# Patient Record
Sex: MALE | Employment: OTHER | URBAN - NONMETROPOLITAN AREA
[De-identification: names, ages, dates, MRNs, and addresses within clinical notes are randomized per-mention and may not be internally consistent; named-entity substitution may affect disease eponyms.]

---

## 2018-04-03 ENCOUNTER — OFFICE VISIT (OUTPATIENT)
Dept: PHYSICAL THERAPY | Facility: CLINIC | Age: 68
End: 2018-04-03
Payer: MEDICARE

## 2018-04-03 DIAGNOSIS — Z96.651 STATUS POST TOTAL RIGHT KNEE REPLACEMENT: Primary | ICD-10-CM

## 2018-04-03 PROCEDURE — 97140 MANUAL THERAPY 1/> REGIONS: CPT

## 2018-04-03 PROCEDURE — G8979 MOBILITY GOAL STATUS: HCPCS | Performed by: PHYSICAL THERAPIST

## 2018-04-03 PROCEDURE — 97110 THERAPEUTIC EXERCISES: CPT

## 2018-04-03 PROCEDURE — G8978 MOBILITY CURRENT STATUS: HCPCS | Performed by: PHYSICAL THERAPIST

## 2018-04-03 NOTE — LETTER
2018    Jessika Abrams MD  [address]  [address] [place] [address]    Patient: Sylvia Rosas  YOB: 1950   Date of Visit: 4/3/2018     Encounter Diagnosis     ICD-10-CM    1  Status post total right knee replacement Z96 651        Dear Dr [facility]HAWAY [facility]:    Please review the attached Plan of Care from [facility] recent visit  Please verify that you agree therapy should continue by signing the attached document and sending it back to our office  If you have any questions or concerns, please don't hesitate to call  Sincerely,    Jaron Soto PT      Referring Provider:      I certify that I have read the below Plan of Care and certify the need for these services furnished under this plan of treatment while under my care  Jessika Abrams MD  [address]  [facility] [address]: [phone]          Daily Note     Today's date: 2018  Patient name: Sylvia Rosas  : 1950  MRN: [id number]  Referring provider: Angel Agee MD  Dx:   Encounter Diagnosis     ICD-10-CM    1  Status post total right knee replacement Z96 651                   Subjective: No new c/o pain today  "I actually was able to sleep last night    I'm starting to really notice improvement with pain, sleeping, everything!"      Objective: See treatment diary below  Precautions R TKR    Specialty Daily Treatment Diary     Manual  4/3       PROM 5min       STM 5min       DTM 5min       TPR                    Exercise Diary  4/3       Balance Board 30x       Parallel Bars-Gait Training 10min       T/G Squats/PF 30x       WP-Hip ABD/ADD&Flex/Ext 30x ea       Bike 10min       Maria R-PF stretch 2min ea       NK Table Ex-Flex/Ext 30x ea, 10#       NK Table Stretch 10min ea       Trimax                                                                                                    Modalities 4/3       MH 20min       IFC 20min Assessment: Tolerated treatment well  Patient exhibited good technique with therapeutic exercises and would benefit from continued PT      Plan: Continue per plan of care  Progress treatment as tolerated  PT Re-Evaluation     Today's date: 2018  Patient name: Albert Brunner  : 1950  MRN: [id number]  Referring provider: Paloma Altamirano MD  Dx:   Encounter Diagnosis     ICD-10-CM    1  Status post total right knee replacement Z96 651        Start Time: 930  Stop Time: 113  Total time in clinic (min): 120 minutes    Assessment    Assessment details: R TKR Sx  Understanding of Dx/Px/POC: excellent   Prognosis: good    Goals  STG- Increase ROM  Decrees  STG - Increase strength by 5 lbs  STG - Full ROM  LTG - Normal standing time and walking distances      Plan  Planned modality interventions: unattended electrical stimulation  Planned therapy interventions: balance, balance/weight bearing training, coordination, flexibility, functional ROM exercises, gait training, graded exercise, therapeutic activities, stretching, strengthening, patient education, manual therapy and joint mobilization  Frequency: 3x week  Duration in weeks: 6        Subjective Evaluation    History of Present Illness  Date of onset: 2018  Date of surgery: 2018  Mechanism of injury: surgery  Mechanism of injury: R TKR Sx    Not a recurrent problem   Quality of life: excellent    Pain  Current pain ratin  At best pain ratin  At worst pain ratin  Quality: discomfort, pressure, pulling, sharp, tight, throbbing and dull ache  Relieving factors: change in position, heat, relaxation, rest and medications  Aggravating factors: running, lifting, stair climbing, walking and standing  Progression: improved    Treatments  Previous treatment: physical therapy  Current treatment: physical therapy  Patient Goals  Patient goals for therapy: decreased pain, improved balance, increased motion, return to work, return to [employer], independence with ADLs/IADLs and increased strength          Objective     Observations     Right Knee   Positive for incision and spasms  Palpation     Right   Muscle spasm in the distal semitendinosus, lateral gastrocnemius and medial gastrocnemius  Tenderness of the distal semitendinosus, lateral gastrocnemius and medial gastrocnemius  Tenderness     Right Knee   Tenderness in the patellar tendon  Passive Range of Motion   Left Knee   Flexion: 115 degrees   Extension: 0 degrees     Right Knee   Flexion: 100 degrees with pain  Extension: 3 degrees with pain    Strength/Myotome Testing     Left Knee   Left knee flexion strength: 0/10   22 lbs  Left knee extension strength: 0/10   37 lbs  Right Knee   Right knee flexion strength: 4/10   18 lbs  Right knee extension strength: 4/10   17 lbs            Precautions: R TKR    Daily Treatment Diary

## 2018-04-04 PROCEDURE — 97014 ELECTRIC STIMULATION THERAPY: CPT

## 2018-04-04 NOTE — PROGRESS NOTES
Daily Note     Today's date: 2018  Patient name: Karen Garcia  : 1950  MRN: [id number]  Referring provider: Connor Murphy MD  Dx:   Encounter Diagnosis     ICD-10-CM    1  Status post total right knee replacement Z96 651                   Subjective: No new c/o pain today  "I actually was able to sleep last night  I'm starting to really notice improvement with pain, sleeping, everything!"      Objective: See treatment diary below  Precautions R TKR    Specialty Daily Treatment Diary     Manual  4/3       PROM 5min       STM 5min       DTM 5min       TPR                    Exercise Diary  4/3       Balance Board 30x       Parallel Bars-Gait Training 10min       T/G Squats/PF 30x       WP-Hip ABD/ADD&Flex/Ext 30x ea       Bike 10min       Maria R-PF stretch 2min ea       NK Table Ex-Flex/Ext 30x ea, 10#       NK Table Stretch 10min ea       Trimax                                                                                                    Modalities 4/3       MH 20min       IFC 20min                         Assessment: Tolerated treatment well  Patient exhibited good technique with therapeutic exercises and would benefit from continued PT      Plan: Continue per plan of care  Progress treatment as tolerated

## 2018-04-05 ENCOUNTER — OFFICE VISIT (OUTPATIENT)
Dept: PHYSICAL THERAPY | Facility: CLINIC | Age: 68
End: 2018-04-05
Payer: MEDICARE

## 2018-04-05 DIAGNOSIS — Z96.651 STATUS POST TOTAL RIGHT KNEE REPLACEMENT: Primary | ICD-10-CM

## 2018-04-05 PROCEDURE — 97140 MANUAL THERAPY 1/> REGIONS: CPT

## 2018-04-05 PROCEDURE — 97014 ELECTRIC STIMULATION THERAPY: CPT

## 2018-04-05 PROCEDURE — 97110 THERAPEUTIC EXERCISES: CPT

## 2018-04-05 PROCEDURE — 97150 GROUP THERAPEUTIC PROCEDURES: CPT

## 2018-04-05 NOTE — PROGRESS NOTES
Daily Note     Today's date: 2018  Patient name: Martin Vergara  : 1950  MRN: [id number]  Referring provider: Oksana Coker MD  Dx:   Encounter Diagnosis     ICD-10-CM    1  Status post total right knee replacement Z96 651            Subjective:  No new c/o pain today  Objective: See treatment diary below  Precautions R TKR    Specialty Daily Treatment Diary     Manual  4/3 4/5      PROM 5min 15min      STM 5min       DTM 5min       TPR                    Exercise Diary  4/3 4/5      Balance Board 30x 30x      Parallel Bars-Gait Training 10min 10min      T/G Squats/PF 30x 30x      WP-Hip ABD/ADD&Flex/Ext 30x ea 30x      Bike 10min 10min      Maria R-PF stretch 2min ea 2min      NK Table Ex-Flex/Ext 30x ea, 10#       NK Table Stretch 10min ea 10min      Trimax-TKE  30x                                                                                                  Modalities 4/3 4/5      MH 20min 20min      IFC 20min 20min                          Assessment: Tolerated treatment well  Patient exhibited good technique with therapeutic exercises      Plan: Continue per plan of care  Progress treatment as tolerated

## 2018-04-06 ENCOUNTER — OFFICE VISIT (OUTPATIENT)
Dept: PHYSICAL THERAPY | Facility: CLINIC | Age: 68
End: 2018-04-06
Payer: MEDICARE

## 2018-04-06 DIAGNOSIS — Z96.651 STATUS POST TOTAL RIGHT KNEE REPLACEMENT: Primary | ICD-10-CM

## 2018-04-06 PROCEDURE — 97150 GROUP THERAPEUTIC PROCEDURES: CPT

## 2018-04-06 PROCEDURE — 97110 THERAPEUTIC EXERCISES: CPT

## 2018-04-06 PROCEDURE — 97140 MANUAL THERAPY 1/> REGIONS: CPT

## 2018-04-06 PROCEDURE — 97014 ELECTRIC STIMULATION THERAPY: CPT

## 2018-04-06 NOTE — PROGRESS NOTES
Daily Note     Today's date: 2018  Patient name: Najma Keller  : 1950  MRN: [id number]  Referring provider: Myrtle Small MD  Dx:   Encounter Diagnosis     ICD-10-CM    1  Status post total right knee replacement Z96 651        Start Time: 930  Stop Time: 111  Total time in clinic (min): 105 minutes    Subjective:Pt feels he is making progress with his knee  He noted slight increased stiffness after tx yesterday  He also completed extra home ADL's  Objective: See treatment diary below  Precautions R TKR     Specialty Daily Treatment Diary      Manual  4/3 4/5  4/6       PROM 5min 15min  15m       STM 5min           DTM 5min           TPR                                 Exercise Diary  4/3 4/5  4/6       Balance Board 30x 30x  30x       Parallel Bars-Gait Training 10min 10min  10m       T/G Squats/PF 30x 30x  30x       WP-Hip ABD/ADD&Flex/Ext 30x ea 30x  30x       Bike 10min 10min  10m       Maria R-PF stretch 2min ea 2min  2m       NK Table Ex-Flex/Ext 30x ea, 10#    30x ea  10#       NK Table Stretch 10min ea 10min  10m       Trimax-TKE   30x  30x                                                                                                                                                                       Modalities 4/3 4/5  4/6       MH 20min 20min  20m       IFC 20min 20min  20m                               Assessment: Tolerated treatment well  Patient exhibited good technique with therapeutic exercises  CP today due to slight increased swelling  Plan: Continue per plan of care

## 2018-04-10 ENCOUNTER — OFFICE VISIT (OUTPATIENT)
Dept: PHYSICAL THERAPY | Facility: CLINIC | Age: 68
End: 2018-04-10
Payer: MEDICARE

## 2018-04-10 DIAGNOSIS — Z96.651 STATUS POST TOTAL RIGHT KNEE REPLACEMENT: Primary | ICD-10-CM

## 2018-04-10 PROCEDURE — 97014 ELECTRIC STIMULATION THERAPY: CPT | Performed by: PHYSICAL THERAPIST

## 2018-04-10 PROCEDURE — 97110 THERAPEUTIC EXERCISES: CPT | Performed by: PHYSICAL THERAPIST

## 2018-04-10 PROCEDURE — 97140 MANUAL THERAPY 1/> REGIONS: CPT | Performed by: PHYSICAL THERAPIST

## 2018-04-10 NOTE — PROGRESS NOTES
Daily Note     Today's date: 4/10/2018  Patient name: India Puentes  : 1950  MRN: [id number]  Referring provider: Christi Martin MD  Dx:   Encounter Diagnosis     ICD-10-CM    1  Status post total right knee replacement Z96 651                   Subjective: Patient states he is feeling stronger and has more endurance  Patient is able to walk better and walk further  Patient is able to use stairs better now  Objective: See treatment diary below      Assessment: Tolerated treatment well  demonstrated fatigue post treatment and would benefit from continued OT      Plan: Progress treatment as tolerated          Manual  4/3 4/5  4/6  4/10     PROM 5min 15min  15m 15 min     STM 5min           DTM 5min           TPR                                 Exercise Diary  4/3 4/5  4/6  4/10     Balance Board 30x 30x  30x  30x     Parallel Bars-Gait Training 10min 10min  10m  10 min     T/G Squats/PF 30x 30x  30x  30x     WP-Hip ABD/ADD&Flex/Ext 30x ea 30x  30x  30x     Bike 10min 10min  10m  10 min     Maria R-PF stretch 2min ea 2min  2m       NK Table Ex-Flex/Ext 30x ea, 10#    30x ea  10# 30x ea c/r flex     NK Table Stretch 10min ea 10min  10m  10 min flex & ext     Trimax-TKE   30x  30x  30x                                      Modalities 4/3 4/5  4/6  4/10     MH 20min 20min  20m  20 min     IFC 20min 20min  20m  20 min

## 2018-04-11 NOTE — PROGRESS NOTES
PT Re-Evaluation     Today's date: 4/3/2018  Patient name: Keisha Chowdhury  : 1950  MRN: [id number]  Referring provider: Dominic Champagne MD  Dx:   Encounter Diagnosis     ICD-10-CM    1  Status post total right knee replacement Z96 651        Start Time: 930  Stop Time: 1130  Total time in clinic (min): 120 minutes    Assessment    Assessment details: R TKR Sx  Understanding of Dx/Px/POC: excellent   Prognosis: good    Goals  STG- Increase ROM  Decrees  STG - Increase strength by 5 lbs  STG - Full ROM  LTG - Normal standing time and walking distances  Plan  Planned modality interventions: unattended electrical stimulation  Planned therapy interventions: balance, balance/weight bearing training, coordination, flexibility, functional ROM exercises, gait training, graded exercise, therapeutic activities, stretching, strengthening, patient education, manual therapy and joint mobilization  Frequency: 3x week  Duration in weeks: 6      Subjective Evaluation    History of Present Illness  Date of onset: 2018  Date of surgery: 2018  Mechanism of injury: surgery  Mechanism of injury: R TKR Sx    Not a recurrent problem   Quality of life: excellent    Pain  Current pain ratin  At best pain ratin  At worst pain ratin  Quality: discomfort, pressure, pulling, sharp, tight, throbbing and dull ache  Relieving factors: change in position, heat, relaxation, rest and medications  Aggravating factors: running, lifting, stair climbing, walking and standing  Progression: improved    Treatments  Previous treatment: physical therapy  Current treatment: physical therapy  Patient Goals  Patient goals for therapy: decreased pain, improved balance, increased motion, return to work, return to [employer] activities, independence with ADLs/IADLs and increased strength        Objective     Observations     Right Knee   Positive for incision and spasms       Palpation     Right   Muscle spasm in the distal semitendinosus, lateral gastrocnemius and medial gastrocnemius  Tenderness of the distal semitendinosus, lateral gastrocnemius and medial gastrocnemius  Tenderness     Right Knee   Tenderness in the patellar tendon  Passive Range of Motion   Left Knee   Flexion: 115 degrees   Extension: 0 degrees     Right Knee   Flexion: 100 degrees with pain  Extension: 3 degrees with pain    Strength/Myotome Testing     Left Knee   Left knee flexion strength: 0/10   22 lbs  Left knee extension strength: 0/10   37 lbs  Right Knee   Right knee flexion strength: 4/10   18 lbs  Right knee extension strength: 4/10   17 lbs      Precautions: R TKR

## 2018-04-12 ENCOUNTER — OFFICE VISIT (OUTPATIENT)
Dept: PHYSICAL THERAPY | Facility: CLINIC | Age: 68
End: 2018-04-12
Payer: MEDICARE

## 2018-04-12 DIAGNOSIS — Z96.651 STATUS POST TOTAL RIGHT KNEE REPLACEMENT: Primary | ICD-10-CM

## 2018-04-12 PROCEDURE — 97110 THERAPEUTIC EXERCISES: CPT

## 2018-04-12 PROCEDURE — 97140 MANUAL THERAPY 1/> REGIONS: CPT

## 2018-04-12 PROCEDURE — 97014 ELECTRIC STIMULATION THERAPY: CPT

## 2018-04-12 NOTE — PROGRESS NOTES
Daily Note     Today's date: 2018  Patient name: Gilberto Bronson  : 1950  MRN: [id number]  Referring provider: Shanti Garsia MD  Dx:   Encounter Diagnosis     ICD-10-CM    1  Status post total right knee replacement Z96 651                   Subjective: No new c/o pain today  "i'm doing alright during the day, but I just can't sleep at night "       Objective: See treatment diary below      Assessment: Tolerated treatment well  Patient exhibited good technique with therapeutic exercises and would benefit from continued PT      Plan: Continue per plan of care  Progress treatment as tolerated          Manual  4/3 4/5  4/6  4/10  4/12   PROM 5min 15min  15m 15 min  10min   STM 5min           DTM 5min           TPR          5min to R hip/ITband region                       Exercise Diary  4/3 4/5  4/6  4/10  4/12   Balance Board 30x 30x  30x  30x  30x   Parallel Bars-Gait Training 10min 10min  10m  10 min  10min   T/G Squats/PF 30x 30x  30x  30x  30x   WP-Hip ABD/ADD&Flex/Ext 30x ea 30x  30x  30x  30x   Bike 10min 10min  10m  10 min  10min   Maria R-PF stretch 2min ea 2min  2m      2min   NK Table Ex-Flex/Ext 30x ea, 10#    30x ea  10# 30x ea c/r flex  30xea   NK Table Stretch 10min ea 10min  10m  10 min flex & ext  10min   Trimax-TKE   30x  30x  30x  30x                                    Modalities 4/3 4/5  4/6  4/10  4/12   MH 20min 20min  20m  20 min  20min   IFC 20min 20min  20m  20 min  20min

## 2018-04-13 ENCOUNTER — OFFICE VISIT (OUTPATIENT)
Dept: PHYSICAL THERAPY | Facility: CLINIC | Age: 68
End: 2018-04-13
Payer: MEDICARE

## 2018-04-13 DIAGNOSIS — Z96.651 STATUS POST TOTAL RIGHT KNEE REPLACEMENT: Primary | ICD-10-CM

## 2018-04-13 PROCEDURE — 97014 ELECTRIC STIMULATION THERAPY: CPT

## 2018-04-13 PROCEDURE — 97140 MANUAL THERAPY 1/> REGIONS: CPT

## 2018-04-13 PROCEDURE — 97110 THERAPEUTIC EXERCISES: CPT

## 2018-04-13 NOTE — PROGRESS NOTES
Daily Note     Today's date: 2018  Patient name: Missael Carreon  : 1950  MRN: [id number]  Referring provider: Maricarmen Brenner MD  Dx:   Encounter Diagnosis     ICD-10-CM    1  Status post total right knee replacement Z96 651                   Subjective: No new c/o pain today  Objective: See treatment diary below      Assessment: Tolerated treatment well  Patient exhibited good technique with therapeutic exercises and would benefit from continued PT    Plan: Continue per plan of care  Progress treatment as tolerated        Manual  4/13 4/5  4/6  4/10  4/12   PROM 10min 15min  15m 15 min  10min   STM 5min           DTM            TPR          5min to R hip/ITband region                       Exercise Diary  4/13 4/5  4/6  4/10  4/12   Balance Board 30x 30x  30x  30x  30x   Parallel Bars-Gait Training 10min 10min  10m  10 min  10min   T/G Squats/PF 30x 30x  30x  30x  30x   WP-Hip ABD/ADD&Flex/Ext 30x ea 30x  30x  30x  30x   Bike 10min 10min  10m  10 min  10min   Maria R-PF stretch 2min ea 2min  2m      2min   NK Table Ex-Flex/Ext 30x ea, 10#    30x ea  10# 30x ea c/r flex  30xea   NK Table Stretch 10min ea 10min  10m  10 min flex & ext  10min   Trimax-TKE  30x 30x  30x  30x  30x                                    Modalities 4/13 4/5  4/6  4/10  4/12   MH 20min 20min  20m  20 min  20min   IFC 20min 20min  20m  20 min  20min

## 2018-04-16 ENCOUNTER — OFFICE VISIT (OUTPATIENT)
Dept: PHYSICAL THERAPY | Facility: CLINIC | Age: 68
End: 2018-04-16
Payer: MEDICARE

## 2018-04-16 DIAGNOSIS — Z96.651 STATUS POST TOTAL RIGHT KNEE REPLACEMENT: Primary | ICD-10-CM

## 2018-04-16 PROCEDURE — 97140 MANUAL THERAPY 1/> REGIONS: CPT

## 2018-04-16 PROCEDURE — 97110 THERAPEUTIC EXERCISES: CPT

## 2018-04-16 PROCEDURE — 97014 ELECTRIC STIMULATION THERAPY: CPT

## 2018-04-16 NOTE — PROGRESS NOTES
Daily Note     Today's date: 2018  Patient name: Missael Carreon  : 1950  MRN: [id number]  Referring provider: Maricarmen Brenner MD  Dx:   Encounter Diagnosis     ICD-10-CM    1  Status post total right knee replacement Z96 651                   Subjective: No new c/o pain today   " I fell over the weekend, I'm fine  I actually feel like I gave myself a couple extra degrees "      Objective: See treatment diary below      Assessment: Tolerated treatment well  Patient exhibited good technique with therapeutic exercises and would benefit from continued PT      Plan: Continue per plan of care  Progress treatment as tolerated          Manual        PROM 10min 15min      STM 5min        DTM         TPR                              Exercise Diary        Balance Board 30x 30x      Parallel Bars-Gait Training 10min 10min      T/G Squats/PF 30x 30x      WP-Hip ABD/ADD&Flex/Ext 30x ea 30x, 27 5#      Bike 10min 10min      Maria R-PF stretch 2min ea 2min      NK Table Ex-Flex/Ext 30x ea, 10# 30x, 15#      NK Table Stretch 10min ea 10min      Trimax-TKE  30x 30x                                       Modalities       MH 20min 20min      IFC 20min 20min

## 2018-04-17 ENCOUNTER — OFFICE VISIT (OUTPATIENT)
Dept: PHYSICAL THERAPY | Facility: CLINIC | Age: 68
End: 2018-04-17
Payer: MEDICARE

## 2018-04-17 DIAGNOSIS — Z96.651 STATUS POST TOTAL RIGHT KNEE REPLACEMENT: Primary | ICD-10-CM

## 2018-04-17 PROCEDURE — G8978 MOBILITY CURRENT STATUS: HCPCS | Performed by: PHYSICAL THERAPIST

## 2018-04-17 PROCEDURE — 97110 THERAPEUTIC EXERCISES: CPT | Performed by: PHYSICAL THERAPIST

## 2018-04-17 PROCEDURE — G8979 MOBILITY GOAL STATUS: HCPCS | Performed by: PHYSICAL THERAPIST

## 2018-04-17 PROCEDURE — 97140 MANUAL THERAPY 1/> REGIONS: CPT | Performed by: PHYSICAL THERAPIST

## 2018-04-17 NOTE — PROGRESS NOTES
Today's date: 2018  Patient name: Gilberto Bronson  : 1950  MRN: [id number]  Referring provider: Shanti Garsia MD  Dx:   Encounter Diagnosis     ICD-10-CM    1  Status post total right knee replacement Z96 651                   Subjective: Salbador Holstein reports that his knee is really falred up today and he cannot perform his exercises as well today  Patient states he did mop his floor and did a lot of twisting with his knee yesterday  Objective: See treatment diary below    Manual       PROM 10min 15min 5'     STM 5min        DTM    5'     TPR     5'                         Exercise Diary       Balance Board 30x 30x 30x     Parallel Bars-Gait Training 10min 10min 10'     T/G Squats/PF 30x 30x 30     WP-Hip ABD/ADD&Flex/Ext 30x ea 30x, 27 5# 20#-30x     Bike 10min 10min 10'     Maria R-PF stretch 2min ea 2min 2'     NK Table Ex-Flex/Ext 30x ea, 10# 30x, 15# 10#-30x     NK Table Stretch 10min ea 10min 20'-flex,ext     Trimax-TKE  30x 30x 30x                                      Modalities      MH 20min 20min 20'     IFC 20min 20min 20'                                     Assessment: Tolerated treatment well  Patient would benefit from continued PT      Plan: Progress treatment as tolerated

## 2018-04-19 ENCOUNTER — OFFICE VISIT (OUTPATIENT)
Dept: PHYSICAL THERAPY | Facility: CLINIC | Age: 68
End: 2018-04-19
Payer: MEDICARE

## 2018-04-19 ENCOUNTER — TRANSCRIBE ORDERS (OUTPATIENT)
Dept: PHYSICAL THERAPY | Facility: CLINIC | Age: 68
End: 2018-04-19

## 2018-04-19 DIAGNOSIS — Z96.651 STATUS POST TOTAL KNEE REPLACEMENT, RIGHT: Primary | ICD-10-CM

## 2018-04-19 DIAGNOSIS — Z96.651 STATUS POST TOTAL RIGHT KNEE REPLACEMENT: Primary | ICD-10-CM

## 2018-04-19 DIAGNOSIS — R26.2 DIFFICULTY WALKING: ICD-10-CM

## 2018-04-19 DIAGNOSIS — M25.561 ACUTE PAIN OF RIGHT KNEE: ICD-10-CM

## 2018-04-19 DIAGNOSIS — S83.8X1D SPRAIN OF OTHER SPECIFIED PARTS OF RIGHT KNEE, SUBSEQUENT ENCOUNTER: ICD-10-CM

## 2018-04-19 PROCEDURE — 97110 THERAPEUTIC EXERCISES: CPT

## 2018-04-19 PROCEDURE — 97140 MANUAL THERAPY 1/> REGIONS: CPT

## 2018-04-19 NOTE — PROGRESS NOTES
Daily Note     Today's date: 2018  Patient name: Laura Latham  : 1950  MRN: [id number]  Referring provider: Remy Leon MD  Dx:   Encounter Diagnosis     ICD-10-CM    1  Status post total right knee replacement Z96 651                   Subjective: "I haven't been going for walks at home since I flared my knees up from mopping  My knee is coming along though  The pain is getting better, but not at night though "      Objective: See treatment diary below      Assessment: Tolerated treatment well  Patient exhibited good technique with therapeutic exercises and would benefit from continued PT  "my R knee definitely feels looser than when I came in today  I'm able to walk without my cane after treatment  I came in walking with my cane "      Plan: Continue per plan of care  Progress treatment as tolerated          Manual      PROM 10min 15min 5' 15min    STM 5min        DTM    5'     TPR     5'      manual        15min           Exercise Diary      Balance Board 30x 30x 30x 30x    Parallel Bars-Gait Training 10min 10min 10' 10min (around facility)    T/G Squats/PF 30x 30x 30 30x    WP-Hip ABD/ADD&Flex/Ext 30x ea 30x, 27 5# 20#-30x 30x ea  20# (standing)   Bike 10min 10min 10' 10min    Maria R-PF stretch 2min ea 2min 2' 2min    NK Table Ex-Flex/Ext 30x ea, 10# 30x, 15# 10#-30x     NK Table Stretch 10min ea 10min 20'-flex,ext     Trimax-TKE  30x 30x 30x 30x                                     Modalities     MH 20min 20min 20' 20min    IFC 20min 20min 20' 20min

## 2018-04-20 ENCOUNTER — OFFICE VISIT (OUTPATIENT)
Dept: PHYSICAL THERAPY | Facility: CLINIC | Age: 68
End: 2018-04-20
Payer: MEDICARE

## 2018-04-20 DIAGNOSIS — Z96.651 STATUS POST TOTAL RIGHT KNEE REPLACEMENT: Primary | ICD-10-CM

## 2018-04-20 PROCEDURE — 97140 MANUAL THERAPY 1/> REGIONS: CPT

## 2018-04-20 PROCEDURE — 97110 THERAPEUTIC EXERCISES: CPT

## 2018-04-20 PROCEDURE — 97014 ELECTRIC STIMULATION THERAPY: CPT

## 2018-04-20 NOTE — PROGRESS NOTES
Today's date: 2018  Patient name: Sarwat Givens  : 1950  MRN: [id number]  Referring provider: Carly Jane MD  Dx:   Encounter Diagnosis     ICD-10-CM    1  Status post total right knee replacement Z96 651                   Subjective: Opal Zabala reports that his R knee is feeling much better than yesterday evening  "I felt bad all over yesterday after therapy until about 6pm   Then, after that, I was just uncomfortable  Today I feel like a million bucks! I'm going to go easy today "      Objective: See treatment diary below      Assessment: Tolerated treatment well  Patient exhibited good technique with therapeutic exercises and would benefit from continued PT  Patient tolerated new exercises well with no c/o increased symptoms  Patient also demonstrated good understanding by demonstrating HEP and stretching program today  Plan: Continue per plan of care  Progress treatment as tolerated         Manual     PROM 10min 15min 5' 15min 10min   STM 5min        DTM    5'     TPR     5'      manual        15min  5min         Exercise Diary     Balance Board 30x 30x 30x 30x 30x   Parallel Bars-Gait Training 10min 10min 10' 10min (around facility) 10min   T/G Squats/PF 30x 30x 30 30x 30x   WP-Hip ABD/ADD&Flex/Ext 30x ea 30x, 27 5# 20#-30x 30x ea  20# Standing-30x ea   Bike 10min 10min 10' 10min 10min   Maria R-PF stretch 2min ea 2min 2' 2min 2min   NK Table Ex-Flex/Ext 30x ea, 10# 30x, 15# 10#-30x     NK Table Stretch 10min ea 10min 20'-flex,ext  10min ea   Trimax-TKE  30x 30x 30x 30x 30x                                    Modalities    MH 20min 20min 20' 20min 20min   IFC 20min 20min 20' 20min 20min

## 2018-04-23 ENCOUNTER — OFFICE VISIT (OUTPATIENT)
Dept: PHYSICAL THERAPY | Facility: CLINIC | Age: 68
End: 2018-04-23
Payer: MEDICARE

## 2018-04-23 DIAGNOSIS — Z96.651 STATUS POST TOTAL RIGHT KNEE REPLACEMENT: Primary | ICD-10-CM

## 2018-04-23 PROCEDURE — 97140 MANUAL THERAPY 1/> REGIONS: CPT

## 2018-04-23 PROCEDURE — 97014 ELECTRIC STIMULATION THERAPY: CPT

## 2018-04-23 PROCEDURE — 97110 THERAPEUTIC EXERCISES: CPT

## 2018-04-23 NOTE — PROGRESS NOTES
Daily Note     Today's date: 2018  Patient name: Rob Herzog  : 1950  MRN: [id number]  Referring provider: Jacque Lee MD  Dx:   Encounter Diagnosis     ICD-10-CM    1  Status post total right knee replacement Z96 651                   Subjective: "my whole knee feels like its wrapped really tight  I did some walking over the weekend, nothing excessive, but my whole leg is a toothache "      Objective: See treatment diary below      Assessment: Tolerated treatment well  Patient exhibited good technique with therapeutic exercises and would benefit from continued PT      Plan: Continue per plan of care  Progress treatment as tolerated        Manual         PROM 15min       STM, DTM        TPR, MFR         Manual therapy 15min              Exercise Diary         Balance Board 30x       Around facility-Gait Training 10min       T/G Squats/PF 30x       WP-Hip ABD/ADD&Flex/Ext 30x ea, 20#       Bike 10min       Maria R-PF stretch 5'       NK Table Ex-Flex/Ext 10#-30x       NK Table Stretch 10#-10' flex/ext       Trimax-TKE 30x                                        Modalities        MH 20min       IFC 20min

## 2018-04-24 ENCOUNTER — OFFICE VISIT (OUTPATIENT)
Dept: PHYSICAL THERAPY | Facility: CLINIC | Age: 68
End: 2018-04-24
Payer: MEDICARE

## 2018-04-24 DIAGNOSIS — S83.8X1D SPRAIN OF OTHER LIGAMENT OF RIGHT KNEE, SUBSEQUENT ENCOUNTER: ICD-10-CM

## 2018-04-24 DIAGNOSIS — R26.2 DIFFICULTY WALKING: ICD-10-CM

## 2018-04-24 DIAGNOSIS — M25.561 ACUTE PAIN OF RIGHT KNEE: ICD-10-CM

## 2018-04-24 DIAGNOSIS — Z96.651 STATUS POST TOTAL RIGHT KNEE REPLACEMENT: Primary | ICD-10-CM

## 2018-04-24 PROCEDURE — 97140 MANUAL THERAPY 1/> REGIONS: CPT | Performed by: PHYSICAL THERAPIST

## 2018-04-24 PROCEDURE — 97014 ELECTRIC STIMULATION THERAPY: CPT | Performed by: PHYSICAL THERAPIST

## 2018-04-24 PROCEDURE — 97110 THERAPEUTIC EXERCISES: CPT | Performed by: PHYSICAL THERAPIST

## 2018-04-24 NOTE — PROGRESS NOTES
Today's date: 2018  Patient name: Bertha Flores  : 1950  MRN: [id number]  Referring provider: Anu Mckeon MD  Dx:   Encounter Diagnosis     ICD-10-CM    1  Status post total right knee replacement Z96 651    2  Acute pain of right knee M25 561    3  Difficulty walking R26 2    4  Sprain of other ligament of right knee, subsequent encounter [id number]      Subjective: Ruthie Goltz reports that his lateral right knee is very tender to palpation  This is the worse pain I have had with the outside of my knee  20 minutes into treatment he stated his right calf is really sore today also  Objective: See treatment diary below  Patient returned after seeing his doctor and he was cleared with no DVT  Assessment: Tolerated treatment well  Patient would benefit from continued PT    Plan: Progress treatment as tolerated         Manual        PROM 15min       STM, DTM        TPR, MFR         Manual therapy 15min  15'            Exercise Diary        Balance Board 30x 30x      Around facility-Gait Training 10min       T/G Squats/PF 30x 30x      WP-Hip ABD/ADD&Flex/Ext 30x ea, 20#       Bike 10min       Maria R-PF stretch 5'       NK Table Ex-Flex/Ext 10#-30x       NK Table Stretch 10#-10' flex/ext       Trimax-TKE 30x        [id number]-Forward, Backwards, Side - Level & Dips              ME, PE   15'                      Modalities       MH 20min       IFC 20min 20'

## 2018-04-24 NOTE — PROGRESS NOTES
PT Evaluation     Today's date: 2018  Patient name: Kike Cordova  : 1950  MRN: [id number]  Referring provider: Mendoza Palomino MD  Dx:   Encounter Diagnosis     ICD-10-CM    1  Status post total right knee replacement Z96 651    2  Acute pain of right knee M25 561    3  Difficulty walking R26 2    4  Sprain of other ligament of right knee, subsequent encounter S83 8X1D               Assessment    Assessment details: Right lateral knee region flared up  Right calf flared up  Sent to doctor for DVT screen  Understanding of Dx/Px/POC: excellent   Prognosis: good    Goals  STG 2-4 weeks  Increase BLE strength 5-10 lbs  Decrease pain to <5/10 with activity  Increase standing/walking tolerance to >30 minutes  Patient independent with HEP    LTG 6-8 weeks  Increase BLE strength 20 lbs  Decrease pain to <2/10 with activity  Increase single leg stance >30 seconds  Increase standing/walking tolerance to >90 minutes      Plan  Planned therapy interventions: joint mobilization, manual therapy, patient education, postural training, strengthening, stretching, therapeutic activities, therapeutic exercise, coordination, balance and home exercise program  Frequency: 3x week  Duration in weeks: 6        Subjective Evaluation    History of Present Illness  Date of onset: 2018  Date of surgery: 2018  Pain  No pain reported  Quality: discomfort, knife-like, pulling, pressure, sharp, radiating, tight, throbbing and cramping  Relieving factors: ice, medications, heat, change in position, relaxation and rest  Aggravating factors: running, stair climbing, standing, walking and sitting    Patient Goals  Patient goals for therapy: decreased pain, improved balance, increased motion, return to work, return to [employer] activities, independence with ADLs/IADLs and increased strength        Objective    Precautions: Right total knee  R TFL flare up      Date of onset:  18     Date of Surgery: 01/16/18    History of Present Episode: 04/24/18  Patient states his right knee is really flared up  Consistent with TFL region  Past Medical History:    04/24/18  Patient reports R TKR SX this January  Pervious Level of Functional Ability:  04/24/18  Patient states his knee had been getting progressively worse and then he had a TKR Sx  Patient had been getting better but now his TFL is all flared up and he is walking worse now  Inspection / Palpation:  04/24/18  Mesomorphic body type  Significant right lateral knee pain and calf pain present  Told patient to see his doctor about his calf pain  Need to rule out R calf DVT  No signs of wounds or drainage  Patient was tender to palpation of his calf and significant muscle guarding and muscle spasm noted  Carbajal's sign was negative  No signs of ecchymotic or erythemic regions noted  Chief Complaints:  04/24/18  Patient states increased difficulty walking and standing  Significant knee and calf pain noted  Very tender to palation with his right knee and right calf region  Difficulty with dorsiflexion      KNEE PAIN Resting Moving Palpation Standing Walking Running Stairs Sleeping   04/24/18  Lt 0 0 0 0 0 0 0 0   04/24/18  Rt 4-8 4-9 5-10 4-8 4-8 NA 5-9 4-8     KNEE ROM Flexion Extension SLR   04/24/18    Lt 118° 0° 78°   04/24/18    Rt 105° 5° 65°     KNEE STRENGTH Flexion Extension Varus Stress Valgus Stress   04/24/18   Lt 0/10  27 lbs 0/10  40 lbs 0/10  32 lbs 0/10  31 lbs   04/24/18   Rt 4/10  14 lbs 5/10  16 lbs 6/10  15 lbs 7/10  12 lbs

## 2018-04-26 ENCOUNTER — OFFICE VISIT (OUTPATIENT)
Dept: PHYSICAL THERAPY | Facility: CLINIC | Age: 68
End: 2018-04-26
Payer: MEDICARE

## 2018-04-26 DIAGNOSIS — R26.2 DIFFICULTY WALKING: ICD-10-CM

## 2018-04-26 DIAGNOSIS — Z96.651 STATUS POST TOTAL RIGHT KNEE REPLACEMENT: Primary | ICD-10-CM

## 2018-04-26 DIAGNOSIS — S83.8X1D SPRAIN OF OTHER LIGAMENT OF RIGHT KNEE, SUBSEQUENT ENCOUNTER: ICD-10-CM

## 2018-04-26 DIAGNOSIS — M25.561 ACUTE PAIN OF RIGHT KNEE: ICD-10-CM

## 2018-04-26 PROCEDURE — 97110 THERAPEUTIC EXERCISES: CPT | Performed by: PHYSICAL THERAPIST

## 2018-04-26 PROCEDURE — 97140 MANUAL THERAPY 1/> REGIONS: CPT | Performed by: PHYSICAL THERAPIST

## 2018-04-26 PROCEDURE — 97014 ELECTRIC STIMULATION THERAPY: CPT | Performed by: PHYSICAL THERAPIST

## 2018-04-26 NOTE — PROGRESS NOTES
Today's date: 2018  Patient name: Kasie Raza  : 1950  MRN: [id number]  Referring provider: Rafael Jay MD  Dx:   Encounter Diagnosis     ICD-10-CM    1  Status post total right knee replacement Z96 651    2  Acute pain of right knee M25 561    3  Difficulty walking R26 2    4  Sprain of other ligament of right knee, subsequent encounter S83 8X1D       Subjective: Madrigal Her reports that his knee and calf are feeling better today  Patient was able to perform more exercises today  Patient did state he was not able to sleep well last night and thinks he had 2-3 hours of sleep because of his knee pain  Objective: See treatment diary below    Assessment: Tolerated treatment well  Patient would benefit from continued PT    Plan: Progress treatment as tolerated         Manual       PROM 15min       STM, DTM        TPR, MFR         Manual therapy 15min  15' 15'           Exercise Diary       Balance Board 30x 30x 30x     Around facility-Gait Training 10min  10'     T/G Squats/PF 30x 30x 30x     WP-Hip ABD/ADD&Flex/Ext 30x ea, 20#  20#-30x  standing     Bike 10min  10'     Maria R-PF stretch 5'  5'     NK Table Ex-Flex/Ext 10#-30x  20#-30x     NK Table Stretch 10#-10' flex/ext  15#-10' - fl/et     Trimax-TKE 30x  30x      K-Laeg-VH-Forward, Backwards, Side - Level & Dips      30x   Víctor Caceres PE   15'  15'                    Modalities      MH 20min  20'     IFC 20min 20' 20'

## 2018-04-27 ENCOUNTER — OFFICE VISIT (OUTPATIENT)
Dept: PHYSICAL THERAPY | Facility: CLINIC | Age: 68
End: 2018-04-27
Payer: MEDICARE

## 2018-04-27 DIAGNOSIS — M25.561 ACUTE PAIN OF RIGHT KNEE: ICD-10-CM

## 2018-04-27 DIAGNOSIS — S83.8X1D SPRAIN OF OTHER LIGAMENT OF RIGHT KNEE, SUBSEQUENT ENCOUNTER: ICD-10-CM

## 2018-04-27 DIAGNOSIS — R26.2 DIFFICULTY WALKING: ICD-10-CM

## 2018-04-27 DIAGNOSIS — Z96.651 STATUS POST TOTAL RIGHT KNEE REPLACEMENT: Primary | ICD-10-CM

## 2018-04-27 PROCEDURE — 97140 MANUAL THERAPY 1/> REGIONS: CPT | Performed by: PHYSICAL THERAPIST

## 2018-04-27 PROCEDURE — 97110 THERAPEUTIC EXERCISES: CPT | Performed by: PHYSICAL THERAPIST

## 2018-04-27 PROCEDURE — 97014 ELECTRIC STIMULATION THERAPY: CPT | Performed by: PHYSICAL THERAPIST

## 2018-04-27 NOTE — PROGRESS NOTES
Today's date: 2018  Patient name: Francisca Barnett  : 1950  MRN: [id number]  Referring provider: Fabby Daily MD  Dx:   Encounter Diagnosis     ICD-10-CM    1  Status post total right knee replacement Z96 651    2  Acute pain of right knee M25 561    3  Difficulty walking R26 2    4  Sprain of other ligament of right knee, subsequent encounter S83 8X1D      Subjective: Magaly Aaron reports that his left knee and left calf regions are feeling better today than this past Monday  Patient states his calf is not as swollen today  Patient states he is not as stiff today as this past week  Objective: See treatment diary below    Assessment: Tolerated treatment well  Patient would benefit from continued PT    Plan: Progress treatment as tolerated         Manual      PROM 15min       STM, DTM        TPR, MFR         Manual therapy 15min  15' 15' 15min          Exercise Dairy      Balance Board 30x 30x 30x 30x    Around facility-Gait Training 10min  10' 10'    T/G Squats/PF 30x 30x 30x 30x    WP-Hip ABD/ADD&Flex/Ext 30x ea, 20#  20#-30x  standing 20#-30x standing    Bike 10min  10' 10'    Maria R-PF stretch 5'  5' 5'    NK Table Ex-Flex/Ext 10#-30x  20#-30x 20#-30x    NK Table Stretch 10#-10' flex/ext  15#-10' - fl/et 20#-10' fl/et    Trimax-TKE 30x  30x      X-Dwjk-LA-Forward, Backwards, Side - Level & Dips      30x  30x Ashlee Lama, PE   15'  15'  15'                  Modalities     MH 20min  20' 15min    IFC 20min 20' 20' 15min

## 2018-04-30 ENCOUNTER — OFFICE VISIT (OUTPATIENT)
Dept: PHYSICAL THERAPY | Facility: CLINIC | Age: 68
End: 2018-04-30
Payer: MEDICARE

## 2018-04-30 DIAGNOSIS — M25.561 ACUTE PAIN OF RIGHT KNEE: ICD-10-CM

## 2018-04-30 DIAGNOSIS — R26.2 DIFFICULTY WALKING: ICD-10-CM

## 2018-04-30 DIAGNOSIS — S83.8X1D SPRAIN OF OTHER LIGAMENT OF RIGHT KNEE, SUBSEQUENT ENCOUNTER: ICD-10-CM

## 2018-04-30 DIAGNOSIS — Z96.651 STATUS POST TOTAL RIGHT KNEE REPLACEMENT: Primary | ICD-10-CM

## 2018-04-30 PROCEDURE — 97110 THERAPEUTIC EXERCISES: CPT

## 2018-04-30 PROCEDURE — 97014 ELECTRIC STIMULATION THERAPY: CPT

## 2018-04-30 PROCEDURE — 97116 GAIT TRAINING THERAPY: CPT

## 2018-04-30 PROCEDURE — 97140 MANUAL THERAPY 1/> REGIONS: CPT

## 2018-04-30 NOTE — PROGRESS NOTES
Daily Note     Today's date: 2018  Patient name: Shey Yu  : 1950  MRN: [id number]  Referring provider: Randell Ivy MD  Dx:   Encounter Diagnosis     ICD-10-CM    1  Status post total right knee replacement Z96 651    2  Difficulty walking R26 2    3  Acute pain of right knee M25 561    4  Sprain of other ligament of right knee, subsequent encounter S83 8X1D                   Subjective: No new c/o pain today  "I was able to sleep a little again, but I continue to get that gnawing pain in my R leg at night "       Objective: See treatment diary below      Assessment: Tolerated treatment well  Patient exhibited good technique with therapeutic exercises and would benefit from continued PT   "I'm pleased with how well my exercises are going these past few days  My swelling is definitely better than last week "  Patient tolerated all exercises and stretching to R LE well today with no increased symptoms post treatment  Plan: Continue per plan of care  Progress treatment as tolerated        Manual     PROM 15min       STM, DTM        TPR, MFR         Manual therapy 15min  15' 15' 15min 15min         Exercise Dairy     Balance Board 30x 30x 30x 30x 30x   Around facility-Gait Training 10min  10' 10' 5min   T/G Squats/PF 30x 30x 30x 30x 30x   WP-Hip ABD/ADD&Flex/Ext 30x ea, 20#  20#-30x  standing 20#-30x standing 20#, 30x ea standing   Bike 10min  10' 10' 10min   Maria R-PF stretch 5'  5' 5' 5min   NK Table Ex-Flex/Ext 10#-30x  20#-30x 20#-30x    NK Table Stretch 10#-10' flex/ext  15#-10' - fl/et 20#-10' fl/et 10min ea   Trimax-TKE 30x  30x  30x    R-Czya-OL-Forward, Backwards, Side - Level & Dips      30x  30x Carlos Pavon PE   15'  15'  15'                  Modalities    MH 20min  20' 15min 20min   IFC 20min 20' 20' 15min 20min

## 2018-05-01 ENCOUNTER — OFFICE VISIT (OUTPATIENT)
Dept: PHYSICAL THERAPY | Facility: CLINIC | Age: 68
End: 2018-05-01
Payer: MEDICARE

## 2018-05-01 DIAGNOSIS — R26.2 DIFFICULTY WALKING: ICD-10-CM

## 2018-05-01 DIAGNOSIS — S83.8X1D SPRAIN OF OTHER LIGAMENT OF RIGHT KNEE, SUBSEQUENT ENCOUNTER: ICD-10-CM

## 2018-05-01 DIAGNOSIS — Z96.651 STATUS POST TOTAL RIGHT KNEE REPLACEMENT: Primary | ICD-10-CM

## 2018-05-01 DIAGNOSIS — M25.561 ACUTE PAIN OF RIGHT KNEE: ICD-10-CM

## 2018-05-01 PROCEDURE — 97116 GAIT TRAINING THERAPY: CPT | Performed by: PHYSICAL THERAPIST

## 2018-05-01 PROCEDURE — 97140 MANUAL THERAPY 1/> REGIONS: CPT | Performed by: PHYSICAL THERAPIST

## 2018-05-01 PROCEDURE — 97110 THERAPEUTIC EXERCISES: CPT | Performed by: PHYSICAL THERAPIST

## 2018-05-01 NOTE — PROGRESS NOTES
Today's date: 2018  Patient name: Kike Cordova  : 1950  MRN: [id number]  Referring provider: Mendoza Palomino MD  Dx:   Encounter Diagnosis     ICD-10-CM    1  Status post total right knee replacement Z96 651    2  Difficulty walking R26 2    3  Acute pain of right knee M25 561    4  Sprain of other ligament of right knee, subsequent encounter S83 8X1D       Subjective: Ez Bella reports that his right knee is feeling nathaniel today  Patient states he still has pain with his knee but he an tell he is improving  Objective: See treatment diary below    Assessment: Tolerated treatment well  Patient would benefit from continued PT    Plan: Progress treatment as tolerated         Manual     PROM        STM, DTM        TPR, MFR         Manual therapy 15'   15min 15min         Exercise Dairy     Balance Board 30x   30x 30x   Around facility-Gait Training 10'   10' 5min   T/G Squats/PF 30x   30x 30x   WP-Hip ABD/ADD&Flex/Ext 20#-30x   20#-30x standing 20#, 30x ea standing   Bike 10'   10' 10min   Maria R-PF stretch 5'   5' 5min   NK Table Ex-Flex/Ext 2#-30x   20#-30x 20#-30x   NK Table Stretch 20#-Fl/Et 10' ea   20#-10' fl/et 10min ea   Trimax-TKE 30x    30x    G-Wsla-MG-Forward, Backwards, Side - Level & Dips  30x     30x Filemon Robledo PE 13'    15'  15'                Modalities    MH 20'   15min 20min   IFC 20'   15min 20min

## 2018-05-03 ENCOUNTER — OFFICE VISIT (OUTPATIENT)
Dept: PHYSICAL THERAPY | Facility: CLINIC | Age: 68
End: 2018-05-03
Payer: MEDICARE

## 2018-05-03 DIAGNOSIS — Z96.651 STATUS POST TOTAL RIGHT KNEE REPLACEMENT: Primary | ICD-10-CM

## 2018-05-03 DIAGNOSIS — R26.2 DIFFICULTY WALKING: ICD-10-CM

## 2018-05-03 DIAGNOSIS — M25.561 ACUTE PAIN OF RIGHT KNEE: ICD-10-CM

## 2018-05-03 DIAGNOSIS — S83.8X1D SPRAIN OF OTHER LIGAMENT OF RIGHT KNEE, SUBSEQUENT ENCOUNTER: ICD-10-CM

## 2018-05-03 PROCEDURE — 97116 GAIT TRAINING THERAPY: CPT

## 2018-05-03 PROCEDURE — 97014 ELECTRIC STIMULATION THERAPY: CPT

## 2018-05-03 PROCEDURE — 97140 MANUAL THERAPY 1/> REGIONS: CPT

## 2018-05-03 PROCEDURE — 97110 THERAPEUTIC EXERCISES: CPT

## 2018-05-03 NOTE — PROGRESS NOTES
Daily Note     Today's date: 5/3/2018  Patient name: India Puentes  : 1950  MRN: [id number]  Referring provider: Christi Martin MD  Dx:   Encounter Diagnosis     ICD-10-CM    1  Status post total right knee replacement Z96 651    2  Difficulty walking R26 2    3  Acute pain of right knee M25 561    4  Sprain of other ligament of right knee, subsequent encounter S83 8X1D                   Subjective: No new c/o pain today  "I still have a hard time sleeping with this pain in my leg  I feel good now walking around and moving my R leg; its just at nighttime "    Objective: See treatment diary below    Assessment: Tolerated treatment well  Patient exhibited good technique with therapeutic exercises and would benefit from continued PT  Patient reports feeling that he has noticed much improvement at this point  "I feel like this has been the most progress I've made since this all happened  I still need to work on my ROM "    Plan: Continue per plan of care  Progress treatment as tolerated        Manual  5/1 5/3      PROM        STM, DTM        TPR, MFR         Manual therapy 15' 15min            Exercise Dairy  5/1 5/3      Balance Board 30x 30x      Around facility-Gait Training 10' 10min      T/G Squats/PF 30x 30x      WP-Hip ABD/ADD&Flex/Ext 20#-30x 20#, 30x ea      Bike 10' 10min      Maria R-PF stretch 5' 5min      NK Table Ex-Flex/Ext 2#-30x       NK Table Stretch 20#-Fl/Et 10' ea 20min      Trimax-TKE 30x 30x       F-Lrlc-IA-Forward, Backwards, Side - Level & Dips  30x  5min       ME, PE 15'                    Modalities 5/1 5/3      MH 20' 20min      IFC 20' 20min

## 2018-05-04 ENCOUNTER — OFFICE VISIT (OUTPATIENT)
Dept: PHYSICAL THERAPY | Facility: CLINIC | Age: 68
End: 2018-05-04
Payer: MEDICARE

## 2018-05-04 DIAGNOSIS — R26.2 DIFFICULTY WALKING: ICD-10-CM

## 2018-05-04 DIAGNOSIS — S83.8X1D SPRAIN OF OTHER LIGAMENT OF RIGHT KNEE, SUBSEQUENT ENCOUNTER: ICD-10-CM

## 2018-05-04 DIAGNOSIS — Z96.651 STATUS POST TOTAL RIGHT KNEE REPLACEMENT: Primary | ICD-10-CM

## 2018-05-04 DIAGNOSIS — M25.561 ACUTE PAIN OF RIGHT KNEE: ICD-10-CM

## 2018-05-04 PROCEDURE — 97014 ELECTRIC STIMULATION THERAPY: CPT

## 2018-05-04 PROCEDURE — 97110 THERAPEUTIC EXERCISES: CPT

## 2018-05-04 PROCEDURE — 97140 MANUAL THERAPY 1/> REGIONS: CPT

## 2018-05-04 PROCEDURE — 97116 GAIT TRAINING THERAPY: CPT

## 2018-05-04 NOTE — PROGRESS NOTES
Daily Note     Today's date: 2018  Patient name: Clive Shaver  : 1950  MRN: [id number]  Referring provider: Jes Bell MD  Dx:   Encounter Diagnosis     ICD-10-CM    1  Status post total right knee replacement Z96 651    2  Difficulty walking R26 2    3  Acute pain of right knee M25 561    4  Sprain of other ligament of right knee, subsequent encounter S83 8X1D                   Subjective: No new c/o pain today  Objective: See treatment diary below    Assessment: Tolerated treatment well  Patient exhibited good technique with therapeutic exercises and would benefit from continued PT    Plan: Continue per plan of care  Progress treatment as tolerated        Manual  5/1 5/3 5/4     PROM        STM, DTM        TPR, MFR         Manual therapy 15' 15min 15min           Exercise Dairy  5/1 5/3 5/4     Balance Board 30x 30x 30x     Around facility-Gait Training 10' 10min 10min     T/G Squats/PF 30x 30x 30x     WP-Hip ABD/ADD&Flex/Ext 20#-30x 20#, 30x ea 20#, 30x ea     Bike 10' 10min 10min     Maria R-PF stretch 5' 5min 5min     NK Table Ex-Flex/Ext 2#-30x       NK Table Stretch 20#-Fl/Et 10' ea 20min 20min     Trimax-TKE 30x 30x 30x      A-Htju-QG-Forward, Backwards, Side - Level & Dips  30x  5min 5min      ME, PE 15'                    Modalities 5/1 5/3 5/4     MH 20' 20min      IFC 20' 20min 20min      VAS    20min

## 2018-05-07 ENCOUNTER — OFFICE VISIT (OUTPATIENT)
Dept: PHYSICAL THERAPY | Facility: CLINIC | Age: 68
End: 2018-05-07
Payer: MEDICARE

## 2018-05-07 DIAGNOSIS — R26.2 DIFFICULTY WALKING: ICD-10-CM

## 2018-05-07 DIAGNOSIS — M25.561 ACUTE PAIN OF RIGHT KNEE: ICD-10-CM

## 2018-05-07 DIAGNOSIS — S83.8X1D SPRAIN OF OTHER LIGAMENT OF RIGHT KNEE, SUBSEQUENT ENCOUNTER: ICD-10-CM

## 2018-05-07 DIAGNOSIS — Z96.651 STATUS POST TOTAL RIGHT KNEE REPLACEMENT: Primary | ICD-10-CM

## 2018-05-07 PROCEDURE — 97014 ELECTRIC STIMULATION THERAPY: CPT

## 2018-05-07 PROCEDURE — G8979 MOBILITY GOAL STATUS: HCPCS | Performed by: PHYSICAL THERAPIST

## 2018-05-07 PROCEDURE — 97140 MANUAL THERAPY 1/> REGIONS: CPT | Performed by: PHYSICAL THERAPIST

## 2018-05-07 PROCEDURE — 97164 PT RE-EVAL EST PLAN CARE: CPT

## 2018-05-07 PROCEDURE — G8978 MOBILITY CURRENT STATUS: HCPCS | Performed by: PHYSICAL THERAPIST

## 2018-05-07 PROCEDURE — 97116 GAIT TRAINING THERAPY: CPT | Performed by: PHYSICAL THERAPIST

## 2018-05-07 PROCEDURE — 97110 THERAPEUTIC EXERCISES: CPT | Performed by: PHYSICAL THERAPIST

## 2018-05-07 NOTE — PROGRESS NOTES
Daily Note     Today's date: 2018  Patient name: Corinne Kelsey  : 1950  MRN: [id number]  Referring provider: Karen Wood MD  Dx: No diagnosis found  Subjective: No new c/o pain today  "I had a pretty normal weekend  I was able to mow my lawn  I had to do it in three sessions but I got it done  It's self propelled but I'm pleased that I did it!"    Objective: See treatment diary below    Assessment: Tolerated treatment well  Patient exhibited good technique with therapeutic exercises and would benefit from continued PT    Plan: Continue per plan of care  Progress treatment as tolerated        Manual  5/1 5/3 5/4 5/7    PROM        STM, DTM        TPR, MFR         Manual therapy 15' 15min 15min 15'          Exercise Dairy  5/1 5/3 5/4 5/7    Balance Board 30x 30x 30x 30x    Around facility-Gait Training 10' 10min 10min 10'    T/G Squats/PF 30x 30x 30x 30x    WP-Hip ABD/ADD&Flex/Ext 20#-30x 20#, 30x ea 20#, 30x ea 22 5#-30x    Bike 10' 10min 10min 10'    Maria R-PF stretch 5' 5min 5min 5'    NK Table Ex-Flex/Ext 2#-30x   20#-30x    NK Table Stretch 20#-Fl/Et 10' ea 20min 20min 20'    Trimax-TKE 30x 30x 30x 30x     A-Zesg-HP-Forward, Backwards, Side - Level & Dips  30x  5min 5min 5'     ME, PE 15'   15'                 Modalities 5/1 5/3 5/4 5/7    MH 20' 20min  20'    IFC 20' 20min 20min 20'     VAS    20min

## 2018-05-07 NOTE — LETTER
May 8, 2018    Neo Das MD  [address]  [facility]  [address] [place] [address]    Patient: Rob Herzog  YOB: 1950   Date of Visit: 2018     Encounter Diagnosis     ICD-10-CM    1  Status post total right knee replacement Z96 651    2  Difficulty walking R26 2    3  Acute pain of right knee M25 561    4  Sprain of other ligament of right knee, subsequent encounter K94 [id number]        Dear Dr Dagmar Ryan:    Please review the attached Plan of Care from [facility] recent visit  Please verify that you agree therapy should continue by signing the attached document and sending it back to our office  If you have any questions or concerns, please don't hesitate to call  Sincerely,    Tremaine Hylton, PT      Referring Provider:      I certify that I have read the below Plan of Care and certify the need for these services furnished under this plan of treatment while under my care  Neo Das MD  [address]  [facility]  [facility] [phone]          Daily Note     Today's date: 2018  Patient name: Rob Herzog  : 1950  MRN: [id number]  Referring provider: Jacque Lee MD  Dx: No diagnosis found  Subjective: No new c/o pain today  "I had a pretty normal weekend  I was able to mow my lawn  I had to do it in three sessions but I got it done  It's self propelled but I'm pleased that I did it!"    Objective: See treatment diary below    Assessment: Tolerated treatment well  Patient exhibited good technique with therapeutic exercises and would benefit from continued PT    Plan: Continue per plan of care  Progress treatment as tolerated        Manual  5/1 5/3 5/4 5/7    PROM        STM, DTM        TPR, MFR         Manual therapy 15' 15min 15min 15'          Exercise Dairy  5/1 5/3 5/4 5/7    Balance Board 30x 30x 30x 30x    Around facility-Gait Training 10' 10min 10min 10'    T/G Squats/PF 30x 30x 30x 30x    WP-Hip ABD/ADD&Flex/Ext 20#-30x 20#, 30x ea 20#, 30x ea 22 5#-30x    Bike 10' 10min 10min 10'    Maria R-PF stretch 5' 5min 5min 5'    NK Table Ex-Flex/Ext 2#-30x   20#-30x    NK Table Stretch 20#-Fl/Et 10' ea 20min 20min 20'    Trimax-TKE 30x 30x 30x 30x     K-Fcai-IN-Forward, Backwards, Side - Level & Dips  30x  5min 5min 5'     ME, PE 15'   15'                 Modalities  5/3 5/4 5    MH 20' 20min  20'    IFC 20' 20min 20min 20'     VAS    20min                     PT Re-Evaluation     Today's date: 2018  Patient name: Karen Garcia  : 1950  MRN: [id number]  Referring provider: Connor Murphy MD  Dx:   Encounter Diagnosis     ICD-10-CM    1  Status post total right knee replacement Z96 651    2  Difficulty walking R26 2    3  Acute pain of right knee M25 561    4   Sprain of other ligament of right knee, subsequent encounter S83 8X1D        Start Time: 0940  Stop Time: 1200  Total time in clinic (min): 140 minutes    Assessment    Assessment details: R TKR , Fall risk  Understanding of Dx/Px/POC: excellent   Prognosis: good    Goals  STG 2-4 weeks  Increase BLE strength 5-10 lbs  Decrease pain to <5/10 with activity  Increase standing/walking tolerance to >30 minutes  Patient independent with HEP    LTG 6-8 weeks  Increase BLE strength 10-20 lbs  Decrease pain to <2/10 with activity  Increase single leg stance >30 seconds  Increase standing/walking tolerance to >90 minutes    Plan  Planned modality interventions: unattended electrical stimulation  Planned therapy interventions: manual therapy, joint mobilization, balance, balance/weight bearing training, patient education, postural training, strengthening, stretching, therapeutic activities, therapeutic exercise, therapeutic training, gait training, flexibility and coordination  Frequency: 3x week  Duration in weeks: 6      Subjective    Objective     Date of onset:  18     Date of Surgery:  18    History of Present Episode: 5/7/2018  Domingo Palma states his knee deteriorated and he went for a TKR Sx  Past Medical History:    5/7/2018  Domingo Palma reports chronic knee issues  Pervious Level of Functional Ability:  5/7/2018  Domingomerari Palma states steady decline with use of his knee region  Inspection / Palpation:  5/7/2018  Mesomorphic / endomorphic Body Type  No signs of infection  No signs of wounds  No signs of drainage  No signs of ecchymotic regions  No signs of erythremic regions  Mild signs of muscle spasm  Mild signs of muscle guarding  Mild to moderate signs of tenderness reported to palpation  Mild signs of swelling  Positive signs of a surgery site  Current conditions appear consistent with recent episode  Chief Complaints:  5/7/2018  Bjorn reports mild to moderate difficulty with standing  Domingo Palma reports mild to moderate difficulty with walking  Domingo Palma reports mild to moderate diffficulty with movement / use of his knee  Domingomerari Palma reports mild to moderate difficulty with use of stairs  Domingo Palma reports mild to moderate difficulty with use of inclines  Domingo Palma reports mild to moderate difficulty with sleeping  Domingo Palma reports mild to moderate difficulty with his strength and endurance  Domingo Palma reports mild to moderate limitations with his range of motion  Domingo Palma reports mild to moderate difficulty lying on his knee region      KNEE PAIN Resting Moving Palpation Standing Walking Running   5/7/2018 Lt 0 0 0 0 0 0   5/7/2018 Rt 0-4 0-5 0-6 0-5 0-5 NA     KNEE PAIN Stairs Sleeping Twisting Jumping   5/7/2018 Lt 0 0 0 0   5/7/2018 Rt 2-5 0-6 3-6 NA     KNEE AROM Flexion Extension SLR   5/7/2018 Lt 125° 0° 85°   5/7/2018 Rt 103° 2° 72°     KNEE PROM Flexion Extension SLR   5/7/2018 Lt 130° 0° 90°   5/7/2018 Rt 106° 0° 78°     KNEE MMT Flexion Extension Varus Stress Valgus Stress   5/7/2018 Lt 0/10  42 lbs 0/10  41 lbs 0/10  39 lbs 0/10  37 lbs   5/7/2018 Rt 3/10  27 lbs 5/10  25 lbs 4/10  19 lbs 4/10  20 lbs Knee Screen MCL LCL ACL PCL   Right Negative Negative Negative Negative   Left Negative Negative Negative Negative     Knee Screen Patellar Quad Stress Meniscal Medial Meniscal Lateral   Right Negative Negative Negative Negative   Left Negative Negative Negative Negative     Precautions: Right TKR

## 2018-05-07 NOTE — PROGRESS NOTES
PT Re-Evaluation     Today's date: 2018  Patient name: Joe Mccartney  : 1950  MRN: [id number]  Referring provider: Maryruth Lesches, MD  Dx:   Encounter Diagnosis     ICD-10-CM    1  Status post total right knee replacement Z96 651    2  Difficulty walking R26 2    3  Acute pain of right knee M25 561    4  Sprain of other ligament of right knee, subsequent encounter S83 8X1D        Start Time: 0940  Stop Time: 1200  Total time in clinic (min): 140 minutes    Assessment    Assessment details: R TKR , Fall risk  Understanding of Dx/Px/POC: excellent   Prognosis: good    Goals  STG 2-4 weeks  Increase BLE strength 5-10 lbs  Decrease pain to <5/10 with activity  Increase standing/walking tolerance to >30 minutes  Patient independent with HEP    LTG 6-8 weeks  Increase BLE strength 10-20 lbs  Decrease pain to <2/10 with activity  Increase single leg stance >30 seconds  Increase standing/walking tolerance to >90 minutes    Plan  Planned modality interventions: unattended electrical stimulation  Planned therapy interventions: manual therapy, joint mobilization, balance, balance/weight bearing training, patient education, postural training, strengthening, stretching, therapeutic activities, therapeutic exercise, therapeutic training, gait training, flexibility and coordination  Frequency: 3x week  Duration in weeks: 6      Subjective    Objective     Date of onset:  18     Date of Surgery:  18    History of Present Episode: 2018  Laxmi Dos Santos states his knee deteriorated and he went for a TKR Sx  Past Medical History:    2018  Laxmi Dos Santos reports chronic knee issues  Pervious Level of Functional Ability:  2018  Laxmi Dos Santos states steady decline with use of his knee region  Inspection / Palpation:  2018  Mesomorphic / endomorphic Body Type  No signs of infection  No signs of wounds  No signs of drainage  No signs of ecchymotic regions  No signs of erythremic regions    Mild signs of muscle spasm  Mild signs of muscle guarding  Mild to moderate signs of tenderness reported to palpation  Mild signs of swelling  Positive signs of a surgery site  Current conditions appear consistent with recent episode  Chief Complaints:  5/7/2018  Bjorn reports mild to moderate difficulty with standing  [facility] reports mild to moderate difficulty with walking  [facility] reports mild to moderate diffficulty with movement / use of his knee  [facility] reports mild to moderate difficulty with use of stairs  [facility] reports mild to moderate difficulty with use of inclines  [facility] reports mild to moderate difficulty with sleeping  [facility] reports mild to moderate difficulty with his strength and endurance  [facility] reports mild to moderate limitations with his range of motion  [facility] reports mild to moderate difficulty lying on his knee region      KNEE PAIN Resting Moving Palpation Standing Walking Running   5/7/2018 Lt 0 0 0 0 0 0   5/7/2018 Rt 0-4 0-5 0-6 0-5 0-5 NA     KNEE PAIN Stairs Sleeping Twisting Jumping   5/7/2018 Lt 0 0 0 0   5/7/2018 Rt 2-5 0-6 3-6 NA     KNEE AROM Flexion Extension SLR   5/7/2018 Lt 125° 0° 85°   5/7/2018 Rt 103° 2° 72°     KNEE PROM Flexion Extension SLR   5/7/2018 Lt 130° 0° 90°   5/7/2018 Rt 106° 0° 78°     KNEE MMT Flexion Extension Varus Stress Valgus Stress   5/7/2018 Lt 0/10  42 lbs 0/10  41 lbs 0/10  39 lbs 0/10  37 lbs   5/7/2018 Rt 3/10  27 lbs 5/10  25 lbs 4/10  19 lbs 4/10  20 lbs     Knee Screen MCL LCL ACL PCL   Right Negative Negative Negative Negative   Left Negative Negative Negative Negative     Knee Screen Patellar Quad Stress Meniscal Medial Meniscal Lateral   Right Negative Negative Negative Negative   Left Negative Negative Negative Negative     Precautions: Right TKR

## 2018-05-08 ENCOUNTER — OFFICE VISIT (OUTPATIENT)
Dept: PHYSICAL THERAPY | Facility: CLINIC | Age: 68
End: 2018-05-08
Payer: MEDICARE

## 2018-05-08 DIAGNOSIS — R26.2 DIFFICULTY WALKING: ICD-10-CM

## 2018-05-08 DIAGNOSIS — S83.8X1D SPRAIN OF OTHER LIGAMENT OF RIGHT KNEE, SUBSEQUENT ENCOUNTER: ICD-10-CM

## 2018-05-08 DIAGNOSIS — M25.561 ACUTE PAIN OF RIGHT KNEE: ICD-10-CM

## 2018-05-08 DIAGNOSIS — Z96.651 STATUS POST TOTAL RIGHT KNEE REPLACEMENT: Primary | ICD-10-CM

## 2018-05-08 PROCEDURE — 97014 ELECTRIC STIMULATION THERAPY: CPT

## 2018-05-08 PROCEDURE — 97140 MANUAL THERAPY 1/> REGIONS: CPT

## 2018-05-08 PROCEDURE — 97110 THERAPEUTIC EXERCISES: CPT

## 2018-05-08 NOTE — PROGRESS NOTES
Daily Note     Today's date: 2018  Patient name: Wilfredo King  : 1950  MRN: [id number]  Referring provider: Belgica Garzon MD  Dx:   Encounter Diagnosis     ICD-10-CM    1  Status post total right knee replacement Z96 651    2  Difficulty walking R26 2    3  Acute pain of right knee M25 561    4  Sprain of other ligament of right knee, subsequent encounter S83 8X1D        Subjective: No new c/o pain today  Objective: See treatment diary below    Assessment: Tolerated treatment well  Patient exhibited good technique with therapeutic exercises and would benefit from continued PT    Plan: Continue per plan of care  Progress treatment as tolerated        Manual  5/1 5/3 5/4 5/7 5/8   PROM        STM, DTM        TPR, MFR         Manual therapy 15' 15min 15min 15' 15min         Exercise Dairy  5/1 5/3 5/4 5/7 5/8   Balance Board 30x 30x 30x 30x 30x   Around facility-Gait Training 10' 10min 10min 10' 5min   T/G Squats/PF 30x 30x 30x 30x 30x   WP-Hip ABD/ADD&Flex/Ext 20#-30x 20#, 30x ea 20#, 30x ea 22 5#-30x 22 5#  35x ea   Bike 10' 10min 10min 10' 10min   Maria R-PF stretch 5' 5min 5min 5' 5min   NK Table Ex-Flex/Ext 2#-30x   20#-30x    NK Table Stretch 20#-Fl/Et 10' ea 20min 20min 20' 20min   Trimax-TKE 30x 30x 30x 30x 30x    M-Kmsg-ZU-Forward, Backwards, Side - Level & Dips  30x  5min 5min 5' 6x Juliet Finer, PE 15'   15'    Maria R-Abs 65# 35x            Modalities 5/1 5/3 5/4 5/7 5/8   MH 20' 20min  20'    IFC 20' 20min 20min 20' 20min    VAS    20min  20min

## 2018-05-10 ENCOUNTER — OFFICE VISIT (OUTPATIENT)
Dept: PHYSICAL THERAPY | Facility: CLINIC | Age: 68
End: 2018-05-10
Payer: MEDICARE

## 2018-05-10 DIAGNOSIS — R26.2 DIFFICULTY WALKING: ICD-10-CM

## 2018-05-10 DIAGNOSIS — S83.8X1D SPRAIN OF OTHER LIGAMENT OF RIGHT KNEE, SUBSEQUENT ENCOUNTER: ICD-10-CM

## 2018-05-10 DIAGNOSIS — M25.561 ACUTE PAIN OF RIGHT KNEE: ICD-10-CM

## 2018-05-10 DIAGNOSIS — Z96.651 STATUS POST TOTAL RIGHT KNEE REPLACEMENT: Primary | ICD-10-CM

## 2018-05-10 PROCEDURE — 97014 ELECTRIC STIMULATION THERAPY: CPT | Performed by: PHYSICAL THERAPIST

## 2018-05-10 PROCEDURE — 97110 THERAPEUTIC EXERCISES: CPT | Performed by: PHYSICAL THERAPIST

## 2018-05-10 PROCEDURE — 97140 MANUAL THERAPY 1/> REGIONS: CPT | Performed by: PHYSICAL THERAPIST

## 2018-05-10 NOTE — PROGRESS NOTES
Today's date: 5/10/2018  Patient name: Kike Cordova  : 1950  MRN: [id number]  Referring provider: Mendoza Palomino MD  Dx:   Encounter Diagnosis     ICD-10-CM    1  Status post total right knee replacement Z96 651    2  Difficulty walking R26 2    3  Acute pain of right knee M25 561    4  Sprain of other ligament of right knee, subsequent encounter S83 8X1D      Subjective: SelestDataContact Fallow reports that his right knee is finally feeling better  Selvictor manuelino Fallow states this is the best he has walked since he came here  Objective: See treatment diary below    Assessment: Tolerated treatment well  Patient exhibited good technique with therapeutic exercises    Plan: Progress treatment as tolerated         Manual  5/10    5/8   PROM        STM, DTM        TPR, MFR         Manual therapy 15'    15min         Exercise Dairy  5/10    5/8   Balance Board 30x    30x   Around facility-Gait Training 10'    5min   T/G Squats/PF 30x    30x   WP-Hip ABD/ADD&Flex/Ext 22 5#-30x    22 5#  35x ea   Bike 10'    10min   Maria R-PF stretch 5'    5min   NK Table Ex-Flex/Ext        NK Table Stretch 20'    20min   Trimax-TKE 30x    30x    O-Mdni-YL-Forward, Backwards, Side - Level & Dips 30x    6x ea    ME, PE 15'       Maria R-Abs             Modalities 5/10    5/8   MH 20'       IFC 20'    20min    VAS      20min

## 2018-05-11 ENCOUNTER — OFFICE VISIT (OUTPATIENT)
Dept: PHYSICAL THERAPY | Facility: CLINIC | Age: 68
End: 2018-05-11
Payer: MEDICARE

## 2018-05-11 DIAGNOSIS — R26.2 DIFFICULTY WALKING: ICD-10-CM

## 2018-05-11 DIAGNOSIS — M25.561 ACUTE PAIN OF RIGHT KNEE: ICD-10-CM

## 2018-05-11 DIAGNOSIS — Z96.651 STATUS POST TOTAL RIGHT KNEE REPLACEMENT: Primary | ICD-10-CM

## 2018-05-11 DIAGNOSIS — S83.8X1D SPRAIN OF OTHER LIGAMENT OF RIGHT KNEE, SUBSEQUENT ENCOUNTER: ICD-10-CM

## 2018-05-11 PROCEDURE — 97014 ELECTRIC STIMULATION THERAPY: CPT

## 2018-05-11 PROCEDURE — 97110 THERAPEUTIC EXERCISES: CPT | Performed by: PHYSICAL THERAPIST

## 2018-05-11 PROCEDURE — 97140 MANUAL THERAPY 1/> REGIONS: CPT | Performed by: PHYSICAL THERAPIST

## 2018-05-11 NOTE — PROGRESS NOTES
Today's date: 2018  Patient name: Sarwat Givens  : 1950  MRN: [id number]  Referring provider: Carly Jane MD  Dx:   Encounter Diagnosis     ICD-10-CM    1  Status post total right knee replacement Z96 651    2  Difficulty walking R26 2    3  Acute pain of right knee M25 561    4  Sprain of other ligament of right knee, subsequent encounter S83 8X1D      Subjective: Opal Zabala reports that his right knee is slowly feeling better  Opal Zabala states he can stand longer and walk further now  Opal Zabala he is finally sleeping better  Objective: See treatment diary below    Assessment: Tolerated treatment well  Patient would benefit from continued PT    Plan: Progress treatment as tolerated         Manual  5/10 5/11   5/8   PROM        STM, DTM        TPR, MFR         Manual therapy 15' 15'   15min         Exercise Diary  5/10 5/11   5/8   Balance Board 30x 30x   30x   Around facility-Gait Training 10' 10'   5min   T/G Squats/PF 30x 30x   30x   WP-Hip ABD/ADD&Flex/Ext 22 5#-30x 22 5#-30x   22 5#  35x ea   Bike 10' 10'   10min   Maria R-PF stretch 5' 5'   5min   NK Table Ex-Flex/Ext        NK Table Stretch 20' 20'   20min   Trimax-TKE 30x 30x   30x    U-Crsf-UL-Forward, Backwards, Side - Level & Dips 30x 30x   6x Verba Musty, PE 15' 15'      Maria R-Abs             Modalities 5/10 5/11   5/8   MH 20' 20'      IFC 20' 20'   20min    VAS      20min

## 2018-05-14 ENCOUNTER — OFFICE VISIT (OUTPATIENT)
Dept: PHYSICAL THERAPY | Facility: CLINIC | Age: 68
End: 2018-05-14
Payer: MEDICARE

## 2018-05-14 DIAGNOSIS — S83.8X1D SPRAIN OF OTHER LIGAMENT OF RIGHT KNEE, SUBSEQUENT ENCOUNTER: ICD-10-CM

## 2018-05-14 DIAGNOSIS — R26.2 DIFFICULTY WALKING: ICD-10-CM

## 2018-05-14 DIAGNOSIS — Z96.651 STATUS POST TOTAL RIGHT KNEE REPLACEMENT: Primary | ICD-10-CM

## 2018-05-14 DIAGNOSIS — M25.561 ACUTE PAIN OF RIGHT KNEE: ICD-10-CM

## 2018-05-14 PROCEDURE — 97110 THERAPEUTIC EXERCISES: CPT | Performed by: PHYSICAL THERAPIST

## 2018-05-14 PROCEDURE — 97140 MANUAL THERAPY 1/> REGIONS: CPT | Performed by: PHYSICAL THERAPIST

## 2018-05-14 PROCEDURE — 97014 ELECTRIC STIMULATION THERAPY: CPT | Performed by: PHYSICAL THERAPIST

## 2018-05-14 NOTE — PROGRESS NOTES
Today's date: 2018  Patient name: Martin Vergara  : 1950  MRN: [id number]  Referring provider: Oksana Coker MD  Dx:   Encounter Diagnosis     ICD-10-CM    1  Status post total right knee replacement Z96 651    2  Difficulty walking R26 2    3  Acute pain of right knee M25 561    4  Sprain of other ligament of right knee, subsequent encounter S83 8X1D       Subjective: Travon Dries reports that his right knee is really flared up today  C/O severe pain at times at therapy today  Objective: See treatment diary below    Assessment: Tolerated treatment poor  Patient would benefit from continued OT    Plan: Progress treatment as tolerated         Manual  5/10 5/11 5/14  5/8   Manual therapy 15' 15' 15'  15min         Exercise Diary  5/10 5/11 5/14  5/8   Balance Board 30x 30x 30x  30x   Around facility-Gait Training 10' 10' 10'  5min   T/G Squats/PF 30x 30x 30x  30x   WP-Hip ABD/ADD&Flex/Ext 22 5#-30x 22 5#-30x 22 5#-30x  22 5#  35x ea   Bike 10' 10' 10'  10min   Maria R-PF stretch 5' 5' 5'  5min   NK Table Ex-Flex/Ext        NK Table Stretch 20' 20' 20'  20min   Trimax-TKE 30x 30x 30x  30x    F-Lkmt-UQ-Forward, Backwards, Side - Level & Dips 30x 30x 30x  6x Eleanora Oneil, PE 15' 15' 15'     Maria R-Abs             Modalities 5/10 5/11 5/14  5/8   MH 20' 20' 20'     IFC 20' 20' 20'  20min    VAS      20min

## 2018-05-15 ENCOUNTER — OFFICE VISIT (OUTPATIENT)
Dept: PHYSICAL THERAPY | Facility: CLINIC | Age: 68
End: 2018-05-15
Payer: MEDICARE

## 2018-05-15 DIAGNOSIS — S83.8X1D SPRAIN OF OTHER LIGAMENT OF RIGHT KNEE, SUBSEQUENT ENCOUNTER: ICD-10-CM

## 2018-05-15 DIAGNOSIS — M25.561 ACUTE PAIN OF RIGHT KNEE: ICD-10-CM

## 2018-05-15 DIAGNOSIS — Z96.651 STATUS POST TOTAL RIGHT KNEE REPLACEMENT: Primary | ICD-10-CM

## 2018-05-15 DIAGNOSIS — R26.2 DIFFICULTY WALKING: ICD-10-CM

## 2018-05-15 PROCEDURE — 97110 THERAPEUTIC EXERCISES: CPT

## 2018-05-15 PROCEDURE — 97140 MANUAL THERAPY 1/> REGIONS: CPT

## 2018-05-15 PROCEDURE — 97014 ELECTRIC STIMULATION THERAPY: CPT

## 2018-05-15 NOTE — PROGRESS NOTES
Daily Note     Today's date: 5/15/2018  Patient name: Kasie Raza  : 1950  MRN: [id number]  Referring provider: Rafael Jay MD  Dx:   Encounter Diagnosis     ICD-10-CM    1  Status post total right knee replacement Z96 651    2  Difficulty walking R26 2    3  Acute pain of right knee M25 561    4  Sprain of other ligament of right knee, subsequent encounter S83 8X1D      Subjective: "My R leg is much better today  I didn't have the gnawing pain last night "    Objective: See treatment diary below  Manual  5/10 5/11 5/14 5/15    Manual therapy 15' 15' 15' 15min          Exercise Diary  5/10 5/11 5/14 5/15    Balance Board 30x 30x 30x 30x    Around facility-Gait Training 10' 10' 10' 5min    T/G Squats/PF 30x 30x 30x 30x    WP-Hip ABD/ADD&Flex/Ext 22 5#-30x 22 5#-30x 22 5#-30x 15# 30x ea    Bike 10' 10' 10' 10min    Maria R-PF stretch 5' 5' 5' 4min    NK Table Ex-Flex/Ext        NK Table Stretch 20' 20' 20'     Trimax-TKE 30x 30x 30x      V-Chyg-WM-Forward, Backwards, Side - Level & Dips 30x 30x 30x 6x Clabe Tiana, PE 15' 15' 15'     Maria R-Abs             Modalities 5/10 5/11 5/14 5/15    MH 20' 20' 20' 20min    IFC 20' 20' 20' 20min     VAS             Assessment: Tolerated treatment well  Patient exhibited good technique with therapeutic exercises and would benefit from continued PT  Patient reports feeling less tight post tx  Plan: Continue per plan of care  Progress treatment as tolerated

## 2018-05-17 ENCOUNTER — APPOINTMENT (OUTPATIENT)
Dept: PHYSICAL THERAPY | Facility: CLINIC | Age: 68
End: 2018-05-17
Payer: MEDICARE

## 2018-05-18 ENCOUNTER — OFFICE VISIT (OUTPATIENT)
Dept: PHYSICAL THERAPY | Facility: CLINIC | Age: 68
End: 2018-05-18
Payer: MEDICARE

## 2018-05-18 DIAGNOSIS — R26.2 DIFFICULTY WALKING: ICD-10-CM

## 2018-05-18 DIAGNOSIS — Z96.651 STATUS POST TOTAL RIGHT KNEE REPLACEMENT: Primary | ICD-10-CM

## 2018-05-18 DIAGNOSIS — S83.8X1D SPRAIN OF OTHER LIGAMENT OF RIGHT KNEE, SUBSEQUENT ENCOUNTER: ICD-10-CM

## 2018-05-18 DIAGNOSIS — M25.561 ACUTE PAIN OF RIGHT KNEE: ICD-10-CM

## 2018-05-18 PROCEDURE — 97110 THERAPEUTIC EXERCISES: CPT | Performed by: PHYSICAL THERAPIST

## 2018-05-18 PROCEDURE — 97014 ELECTRIC STIMULATION THERAPY: CPT

## 2018-05-18 PROCEDURE — 97140 MANUAL THERAPY 1/> REGIONS: CPT | Performed by: PHYSICAL THERAPIST

## 2018-05-18 NOTE — PROGRESS NOTES
Today's date: 2018  Patient name: Gilberto Bronson  : 1950  MRN: [id number]  Referring provider: Shanti Garsia MD  Dx:   Encounter Diagnosis     ICD-10-CM    1  Status post total right knee replacement Z96 651    2  Difficulty walking R26 2    3  Acute pain of right knee M25 561    4  Sprain of other ligament of right knee, subsequent encounter S83 8X1D      Subjective: Christophe Holstein reports that his right knee is feeling better today  Patient to concentrate on ROM today  Objective: See treatment diary below    Assessment: Tolerated treatment well  Patient exhibited good technique with therapeutic exercises and would benefit from continued PT    Plan: Continue per plan of care  Progress treatment as tolerated         Manual  5/11 5/14 5/15 5/18   MT  15' 15' 15' 15'           Exercise Diary   5/11 5/14 5/15 5/18   Balance Board  30x 30x 30x 30x   Around facility-Gait Training  10' 10' 5min 10'   T/G Squats/PF  30x 30x 30x 30x   WP-Hip ABD/ADD&Flex/Ext  22 5#-30x 22 5#-30x 15# 30x ea    Bike  10' 10' 10min 10'   Maria R-PF stretch  5' 5' 4min 4'   NK Table Ex-Flex/Ext        NK Table Stretch  20' 20'  20'-Fl/Ex   Trimax-TKE  30x 30x      U-Czle-EZ-Forward, Backwards, Side - Level & Dips  30x 30x 6x [place], PE  15' 15'  15'   Maria R-Abs                Modalities  5/11 5/14 5/15 5/18   MH/ES  20' 20' 20' 20'   VAS/ES

## 2018-05-22 ENCOUNTER — OFFICE VISIT (OUTPATIENT)
Dept: PHYSICAL THERAPY | Facility: CLINIC | Age: 68
End: 2018-05-22
Payer: MEDICARE

## 2018-05-22 DIAGNOSIS — R26.2 DIFFICULTY WALKING: ICD-10-CM

## 2018-05-22 DIAGNOSIS — S83.8X1D SPRAIN OF OTHER LIGAMENT OF RIGHT KNEE, SUBSEQUENT ENCOUNTER: ICD-10-CM

## 2018-05-22 DIAGNOSIS — M25.561 ACUTE PAIN OF RIGHT KNEE: ICD-10-CM

## 2018-05-22 DIAGNOSIS — Z96.651 STATUS POST TOTAL RIGHT KNEE REPLACEMENT: Primary | ICD-10-CM

## 2018-05-22 PROCEDURE — 97014 ELECTRIC STIMULATION THERAPY: CPT

## 2018-05-22 PROCEDURE — 97140 MANUAL THERAPY 1/> REGIONS: CPT

## 2018-05-22 PROCEDURE — 97110 THERAPEUTIC EXERCISES: CPT

## 2018-05-22 NOTE — PROGRESS NOTES
Daily Note     Today's date: 2018  Patient name: Benita Crum  : 1950  MRN: [id number]  Referring provider: Filiberto Oliva MD  Dx: No diagnosis found  Subjective: "My R knee is so much better! There is little to no swelling in this thing today  I was able to trim my hedges yesterday  I started at 11am and ended at 8pm   I did take 2-3 hour breaks, but I got it finished  I'm not doing my regular walks yet at home because it hurts to walk, but my movement is better  I think I was just doing too much before  I'm able to sit at my desk longer now, which means I'm able to concentrate on what I'm working on  I don't have that throbbing pain anymore  I'm also able to finally sleep at night too!"      Objective: See treatment diary below    Assessment: Tolerated treatment well  Patient exhibited good technique with therapeutic exercises and would benefit from continued PT  Patient able to tolerate all of his therapeutic exs and txs well with no increase in symptoms  Patient reports feeling no pain when ambulating around the clinic post tx , "It doesn't hurt to walk now!"      Plan: Continue per plan of care  Progress treatment as tolerated      Manual        MT 15min               Exercise Diary         Balance Board 30x       Around facility-Gait Training        T/G Squats/PF 30x       WP-Hip ABD/ADD&Flex/Ext 15# 30x ea       Bike 10min       Maria R-PF stretch 4min       NK Table Ex-Flex/Ext        NK Table Stretch        Trimax-TKE 30x        R-Qkpa-UT-Forward, Backwards, Side - Level & Dips         ME, PE        Maria R-Abs                Modalities        MH/ES 20min       VAS/ES

## 2018-05-24 ENCOUNTER — OFFICE VISIT (OUTPATIENT)
Dept: PHYSICAL THERAPY | Facility: CLINIC | Age: 68
End: 2018-05-24
Payer: MEDICARE

## 2018-05-24 DIAGNOSIS — Z96.651 STATUS POST TOTAL RIGHT KNEE REPLACEMENT: Primary | ICD-10-CM

## 2018-05-24 DIAGNOSIS — S83.8X1D SPRAIN OF OTHER LIGAMENT OF RIGHT KNEE, SUBSEQUENT ENCOUNTER: ICD-10-CM

## 2018-05-24 DIAGNOSIS — R26.2 DIFFICULTY WALKING: ICD-10-CM

## 2018-05-24 DIAGNOSIS — M25.561 ACUTE PAIN OF RIGHT KNEE: ICD-10-CM

## 2018-05-24 PROCEDURE — 97140 MANUAL THERAPY 1/> REGIONS: CPT | Performed by: PHYSICAL THERAPIST

## 2018-05-24 PROCEDURE — 97110 THERAPEUTIC EXERCISES: CPT | Performed by: PHYSICAL THERAPIST

## 2018-05-24 PROCEDURE — 97014 ELECTRIC STIMULATION THERAPY: CPT | Performed by: PHYSICAL THERAPIST

## 2018-05-24 PROCEDURE — 97116 GAIT TRAINING THERAPY: CPT | Performed by: PHYSICAL THERAPIST

## 2018-05-24 NOTE — PROGRESS NOTES
Today's date: 2018  Patient name: Sherrill Armstrong  : 1950  MRN: [id number]  Referring provider: Yolanda Herrera MD  Dx:   Encounter Diagnosis     ICD-10-CM    1  Status post total right knee replacement Z96 651    2  Difficulty walking R26 2    3  Acute pain of right knee M25 561    4  Sprain of other ligament of right knee, subsequent encounter S83 8X1D      Subjective: Sania Navas reports that his left knee and left leg are feeling a lot better  Patient states he is trying to be careful with how much he walks, stands or exercises  Objective: See treatment diary below    Assessment: Tolerated treatment well  Patient exhibited good technique with therapeutic exercises and would benefit from continued PT    Plan: Continue per plan of care  Progress treatment as tolerated         Manual       MT 15min 15'              Exercise Diary        Balance Board 30x 30x      Around facility-Gait Training  10'      T/G Squats/PF 30x 30x      WP-Hip ABD/ADD&Flex/Ext 15# 30x ea 15#-30x      Bike 10min 10'      Maria R-PF stretch 4min 4'      NK Table Ex-Flex/Ext        NK Table Stretch        Trimax-TKE 30x 30x       F-Leth-YJ-Forward, Backwards, Side - Level & Dips  30x       ME, PE  15'      Maria R-Abs                Modalities       MH/ES 20min 20'      VAS/ES

## 2018-05-29 ENCOUNTER — OFFICE VISIT (OUTPATIENT)
Dept: PHYSICAL THERAPY | Facility: CLINIC | Age: 68
End: 2018-05-29
Payer: MEDICARE

## 2018-05-29 DIAGNOSIS — S83.8X1D SPRAIN OF OTHER LIGAMENT OF RIGHT KNEE, SUBSEQUENT ENCOUNTER: ICD-10-CM

## 2018-05-29 DIAGNOSIS — Z96.651 STATUS POST TOTAL RIGHT KNEE REPLACEMENT: Primary | ICD-10-CM

## 2018-05-29 DIAGNOSIS — R26.2 DIFFICULTY WALKING: ICD-10-CM

## 2018-05-29 DIAGNOSIS — M25.561 ACUTE PAIN OF RIGHT KNEE: ICD-10-CM

## 2018-05-29 PROCEDURE — 97110 THERAPEUTIC EXERCISES: CPT

## 2018-05-29 PROCEDURE — 97140 MANUAL THERAPY 1/> REGIONS: CPT

## 2018-05-29 PROCEDURE — 97014 ELECTRIC STIMULATION THERAPY: CPT

## 2018-05-29 NOTE — PROGRESS NOTES
Daily Note     Today's date: 2018  Patient name: Corinne Kelsey  : 1950  MRN: [id number]  Referring provider: Karen Wood MD  Dx:   Encounter Diagnosis     ICD-10-CM    1  Status post total right knee replacement Z96 651    2  Difficulty walking R26 2    3  Acute pain of right knee M25 561    4  Sprain of other ligament of right knee, subsequent encounter S83 8X1D      Subjective: "I'm able to sleep at night without taking any meds  I feel like I've reached another high plateau! I plan on mowing my grass today after therapy  I still try not to over do things  I'm getting better at going down the stairs, I feel like I need another 5 degrees of bend to get it right "    Objective: See treatment diary below    Assessment: Tolerated treatment well  Patient exhibited good technique with therapeutic exercises and would benefit from continued PT    Plan: Continue per plan of care  Progress treatment as tolerated          Manual      MT 15min 15'              Exercise Diary       Balance Board 30x 30x 30x     Around facility-Gait Training  10' 5min     T/G Squats/PF 30x 30x 30x ea     WP-Hip ABD/ADD&Flex/Ext 15# 30x ea 15#-30x 15# 30x ea     Bike 10min 10' 10min     Maria R-PF stretch 4min 4' 4min     NK Table Ex-Flex/Ext        NK Table Stretch        Trimax-TKE 30x 30x       J-Gzrw-EE-Forward, Backwards, Side - Level & Dips  30x 6x ea      ME, PE  15' 15min     Maria R-Abs        Wall Sit   20sec     Modalities      MH/ES 20min 20'      VAS/ES

## 2018-05-31 ENCOUNTER — OFFICE VISIT (OUTPATIENT)
Dept: PHYSICAL THERAPY | Facility: CLINIC | Age: 68
End: 2018-05-31
Payer: MEDICARE

## 2018-05-31 DIAGNOSIS — S83.8X1D SPRAIN OF OTHER LIGAMENT OF RIGHT KNEE, SUBSEQUENT ENCOUNTER: ICD-10-CM

## 2018-05-31 DIAGNOSIS — R26.2 DIFFICULTY WALKING: ICD-10-CM

## 2018-05-31 DIAGNOSIS — Z96.651 STATUS POST TOTAL RIGHT KNEE REPLACEMENT: Primary | ICD-10-CM

## 2018-05-31 DIAGNOSIS — M25.561 ACUTE PAIN OF RIGHT KNEE: ICD-10-CM

## 2018-05-31 PROCEDURE — G8978 MOBILITY CURRENT STATUS: HCPCS | Performed by: PHYSICAL THERAPIST

## 2018-05-31 PROCEDURE — G8979 MOBILITY GOAL STATUS: HCPCS | Performed by: PHYSICAL THERAPIST

## 2018-05-31 PROCEDURE — 97014 ELECTRIC STIMULATION THERAPY: CPT

## 2018-05-31 PROCEDURE — 97110 THERAPEUTIC EXERCISES: CPT

## 2018-05-31 PROCEDURE — 97164 PT RE-EVAL EST PLAN CARE: CPT | Performed by: PHYSICAL THERAPIST

## 2018-05-31 PROCEDURE — 97140 MANUAL THERAPY 1/> REGIONS: CPT

## 2018-05-31 NOTE — LETTER
May 31, 2018      Lady Ruy MD  [address]  [address]    Patient: Kasie Raza  YOB: 1950   Date of Visit: 2018     Encounter Diagnosis     ICD-10-CM    1  Status post total right knee replacement Z96 651    2  Difficulty walking R26 2    3  Acute pain of right knee M25 561    4  Sprain of other ligament of right knee, subsequent encounter I72 [id number]        Dear Dr Helen Diaz:    Please review the attached Plan of Care from [facility] recent visit  Please verify that you agree therapy should continue by signing the attached document and sending it back to our office  If you have any questions or concerns, please don't hesitate to call  Sincerely,    Acosta Boykin PT      Referring Provider:      I certify that I have read the below Plan of Care and certify the need for these services furnished under this plan of treatment while under my care  Lady Ruy MD  [address]  [facility] Lauren: [phone]          Daily Note     Today's date: 2018  Patient name: Kasie Raza  : 1950  MRN: [id number]  Referring provider: Rafael Jay MD  Dx:   Encounter Diagnosis     ICD-10-CM    1  Status post total right knee replacement Z96 651    2  Difficulty walking R26 2    3  Acute pain of right knee M25 561    4  Sprain of other ligament of right knee, subsequent encounter S83 8X1D      Subjective: No new c/o pain today  "Tuesday when I left here 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 and I had to go downstairs and I was able to walk down the stairs almost perfect!"     Objective: See treatment diary below    Assessment: Tolerated treatment well  Patient exhibited good technique with therapeutic exercises and would benefit from continued PT    Plan: Continue per plan of care  Progress treatment as tolerated        Manual     MT 15min 15' 15min 15min            Exercise Diary      Balance Board 30x 30x 30x 30x    Around facility-Gait Training  10' 5min 5min    T/G Squats/PF 30x 30x 30x ea 30x ea    WP-Hip ABD/ADD&Flex/Ext 15# 30x ea 15#-30x 15# 30x ea 20# 30x ea    Bike 10min 10' 10min 10min    Maria R-PF stretch 4min 4' 4min 4min    NK Table Ex-Flex/Ext        NK Table Stretch        Trimax-TKE 30x 30x  30x ea     A-Dien-SE-Forward, Backwards, Side - Level & Dips  30x 6x ea 6x ea     ME, PE  15' 15min 15min    Maria R-Abs        Wall Sit   20sec 20sec    Modalities     MH/ES 20min 20' 20min 20min    VAS/ES                PT Re-Evaluation     Today's date: 2018  Patient name: Shey Yu  : 1950  MRN: [id number]  Referring provider: Randell Ivy MD  Dx:   Encounter Diagnosis     ICD-10-CM    1  Status post total right knee replacement Z96 651    2  Difficulty walking R26 2    3  Acute pain of right knee M25 561    4  Sprain of other ligament of right knee, subsequent encounter S83 8X1D      Start Time: 0950  Stop Time: 1110  Total time in clinic (min): 80 minutes    Assessment    Assessment details: Right Total Knee Replacement with Fall / Injury    Understanding of Dx/Px/POC: excellent  Goals  STG 2-4 weeks  Increase BLE strength 5-10 lbs  Decrease pain to <5/10 with activity  Increase standing/walking tolerance to >30 minutes  Patient independent with HEP    LTG 6-8 weeks  Increase BLE strength 10-20 lbs  Decrease pain to <2/10 with activity  Increase single leg stance >30 seconds  Increase standing/walking tolerance to >90 minutes    Plan  Planned modality interventions: unattended electrical stimulation  Planned therapy interventions: joint mobilization, manual therapy, patient education, strengthening, stretching, therapeutic activities, therapeutic exercise, therapeutic training, transfer training, gait training, flexibility, coordination, balance and balance/weight bearing training  Frequency: 3x week  Duration in weeks: 6      Subjective Evaluation    History of Present Illness  Not a recurrent problem   Quality of life: excellent    Pain  Quality: discomfort, dull ache, sharp, tight and squeezing  Relieving factors: change in position, heat, ice, relaxation, rest and medications  Aggravating factors: running, lifting, standing, walking and stair climbing  Progression: improved    Treatments  Current treatment: physical therapy  Patient Goals  Patient goals for therapy: decreased pain, improved balance, increased motion, return to work, return to [employer] activities, independence with ADLs/IADLs and increased strength        Objective    Flowsheet Rows      Most Recent Value   PT/OT G-Codes   Assessment Type  Re-evaluation   G code set  Mobility: Walking & Moving Around   Mobility: Walking and Moving Around Current Status ()  CI   Mobility: Walking and Moving Around Goal Status ()  [address] [place] [address]        Date of onset:  01/16/18     Date of Surgery:  01/16/18    History of Present Episode: 5/7/2018  Gema Weber states his knee deteriorated and he went for a TKR Sx  Past Medical History:    5/7/2018  Gema Weber reports chronic knee issues  Pervious Level of Functional Ability:  5/7/2018  Gema Weber states steady decline with use of his knee region  Inspection / Palpation:  5/7/2018  Mesomorphic / endomorphic Body Type  No signs of infection  No signs of wounds  No signs of drainage  No signs of ecchymotic regions  No signs of erythremic regions  Mild signs of muscle spasm  Mild signs of muscle guarding  Mild to moderate signs of tenderness reported to palpation  Mild signs of swelling  Positive signs of a surgery site  Current conditions appear consistent with recent episode  Chief Complaints:  5/7/2018  Bjorn reports mild to moderate difficulty with standing  Gema Weber reports mild to moderate difficulty with walking  Gema Weber reports mild to moderate diffficulty with movement / use of his knee    Gema Weber reports mild to moderate difficulty with use of stairs  Sania Yosef reports mild to moderate difficulty with use of inclines  Sania Yosef reports mild to moderate difficulty with sleeping  Sania Yosef reports mild to moderate difficulty with his strength and endurance  Sania Yosef reports mild to moderate limitations with his range of motion  Sania Yosef reports mild to moderate difficulty lying on his knee region  5/31/2018 Bjorn reports mild difficulty with standing  Sania Yosef reports mild difficulty with walking  Sania Yosef reports mild diffficulty with movement / use of his right knee  Sania Yosef reports mild difficulty with use of stairs  Sania Yosef reports mild difficulty with use of inclines  Sania Yosef reports mild difficulty with sleeping  Sania Yosef reports mild difficulty with his strength and endurance  Sania Yosef reports mild limitations with his range of motion  Sania Yosef reports mild difficulty lying on his right knee region      KNEE PAIN Resting Moving Palpation Standing Walking Running   5/7/2018 Lt 0 0 0 0 0 0   5/7/2018 Rt 0-4 0-5 0-6 0-5 0-5 NA   5/31/2018 Rt 0-2 0-2 0-3 0-3 0-3 NA     KNEE PAIN Stairs Sleeping Twisting Jumping   5/7/2018 Lt 0 0 0 0   5/7/2018 Rt 2-5 0-6 3-6 NA   5/31/2018 Rt 1-3 0-3 1-3 NA     KNEE AROM Flexion Extension SLR   5/7/2018 Lt 125° 0° 85°   5/7/2018 Rt 103° 2° 72°   5/31/2018 Rt 114° 1° 79°     KNEE PROM Flexion Extension SLR   5/7/2018 Lt 130° 0° 90°   5/7/2018 Rt 106° 0° 78°   5/31/18 Rt 117° 0° 82°     KNEE MMT Flexion Extension Varus Stress Valgus Stress   5/7/2018 Lt 0/10  42 lbs 0/10  41 lbs 0/10  39 lbs 0/10  37 lbs   5/7/2018 Rt 3/10  27 lbs 5/10  25 lbs 4/10  19 lbs 4/10  20 lbs   5/31/18 Rt 1/10  32 lbs 3/10  31 lbs 2/10  24 lbs 2/10  27 lbs     Precautions: Right TKR

## 2018-05-31 NOTE — PROGRESS NOTES
Daily Note     Today's date: 2018  Patient name: Missael Carreon  : 1950  MRN: [id number]  Referring provider: Maricarmen Brenner MD  Dx:   Encounter Diagnosis     ICD-10-CM    1  Status post total right knee replacement Z96 651    2  Difficulty walking R26 2    3  Acute pain of right knee M25 561    4  Sprain of other ligament of right knee, subsequent encounter S83 8X1D      Subjective: No new c/o pain today  "Tuesday when I left here 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 and I had to go downstairs and I was able to walk down the stairs almost perfect!"     Objective: See treatment diary below    Assessment: Tolerated treatment well  Patient exhibited good technique with therapeutic exercises and would benefit from continued PT    Plan: Continue per plan of care  Progress treatment as tolerated        Manual     MT 15min 15' 15min 15min            Exercise Diary      Balance Board 30x 30x 30x 30x    Around facility-Gait Training  10' 5min 5min    T/G Squats/PF 30x 30x 30x ea 30x ea    WP-Hip ABD/ADD&Flex/Ext 15# 30x ea 15#-30x 15# 30x ea 20# 30x ea    Bike 10min 10' 10min 10min    Maria R-PF stretch 4min 4' 4min 4min    NK Table Ex-Flex/Ext        NK Table Stretch        Trimax-TKE 30x 30x  30x ea     M-Ntxw-VT-Forward, Backwards, Side - Level & Dips  30x 6x ea 6x ea     ME, PE  15' 15min 15min    Maria R-Abs        Wall Sit   20sec 20sec    Modalities     MH/ES 20min 20' 20min 20min    VAS/ES

## 2018-06-05 ENCOUNTER — OFFICE VISIT (OUTPATIENT)
Dept: PHYSICAL THERAPY | Facility: CLINIC | Age: 68
End: 2018-06-05
Payer: MEDICARE

## 2018-06-05 DIAGNOSIS — Z96.651 STATUS POST TOTAL RIGHT KNEE REPLACEMENT: Primary | ICD-10-CM

## 2018-06-05 DIAGNOSIS — R26.2 DIFFICULTY WALKING: ICD-10-CM

## 2018-06-05 DIAGNOSIS — S83.8X1D SPRAIN OF OTHER LIGAMENT OF RIGHT KNEE, SUBSEQUENT ENCOUNTER: ICD-10-CM

## 2018-06-05 DIAGNOSIS — M25.561 ACUTE PAIN OF RIGHT KNEE: ICD-10-CM

## 2018-06-05 PROCEDURE — 97110 THERAPEUTIC EXERCISES: CPT

## 2018-06-05 PROCEDURE — 97140 MANUAL THERAPY 1/> REGIONS: CPT

## 2018-06-05 PROCEDURE — 97014 ELECTRIC STIMULATION THERAPY: CPT | Performed by: PHYSICAL THERAPIST

## 2018-06-05 NOTE — PROGRESS NOTES
Today's date: 2018  Patient name: Kike Cordova  : 1950  MRN: [id number]  Referring provider: Mendoza Palomino MD  Dx:   Encounter Diagnosis     ICD-10-CM    1  Status post total right knee replacement Z96 651    2  Difficulty walking R26 2    3  Acute pain of right knee M25 561    4  Sprain of other ligament of right knee, subsequent encounter S83 8X1D      Subjective: Ez Bella reports that his right knee is slowly feeling better  Patient states he can almost walk up stairs using his right leg  Patient states down stairs is still limited  Patient states his pain level is improving significantly  Objective: See treatment diary below    Assessment: Tolerated treatment well  Patient exhibited good technique with therapeutic exercises and would benefit from continued PT    Plan: Continue per plan of care  Progress treatment as tolerated         Manual  6   MT 15min 15' 15min 15min 15'           Exercise Diary   6   Balance Board 30x 30x 30x 30x 30x   Around facility-Gait Training  10' 5min 5min 5'   T/G Squats/PF 30x 30x 30x ea 30x ea 30x   WP-Hip ABD/ADD&Flex/Ext 15# 30x ea 15#-30x 15# 30x ea 20# 30x ea 20#-30x   Bike 10min 10' 10min 10min 10'   Maria R-PF stretch 4min 4' 4min 4min 4'   NK Table Ex-Flex/Ext        NK Table Stretch        Trimax-TKE 30x 30x  30x ea 30x    H-Fhdn-PM-Forward, Backwards, Side - Level & Dips  30x 6x ea 6x ea 6x    ME, PE  15' 15min 15min 15'   Maria R-Abs        Wall Sit   20sec 20sec 20"   Modalities  6   MH/ES 20min 20' 20min 20min 20'   VAS/ES

## 2018-06-06 ENCOUNTER — TRANSCRIBE ORDERS (OUTPATIENT)
Dept: PHYSICAL THERAPY | Facility: CLINIC | Age: 68
End: 2018-06-06

## 2018-06-06 DIAGNOSIS — M25.561 ACUTE PAIN OF RIGHT KNEE: ICD-10-CM

## 2018-06-06 DIAGNOSIS — S83.8X1D SPRAIN OF OTHER LIGAMENT OF RIGHT KNEE, SUBSEQUENT ENCOUNTER: ICD-10-CM

## 2018-06-06 DIAGNOSIS — R26.2 DIFFICULTY WALKING: ICD-10-CM

## 2018-06-06 DIAGNOSIS — Z96.651 STATUS POST TOTAL KNEE REPLACEMENT, RIGHT: Primary | ICD-10-CM

## 2018-06-06 NOTE — PROGRESS NOTES
PT Re-Evaluation     Today's date: 2018  Patient name: Missael Carreon  : 1950  MRN: [id number]  Referring provider: Maricarmen Brenner MD  Dx:   Encounter Diagnosis     ICD-10-CM    1  Status post total right knee replacement Z96 651    2  Difficulty walking R26 2    3  Acute pain of right knee M25 561    4  Sprain of other ligament of right knee, subsequent encounter S83 8X1D      Start Time: 0950  Stop Time: 1110  Total time in clinic (min): 80 minutes    Assessment    Assessment details: Right Total Knee Replacement with Fall / Injury    Understanding of Dx/Px/POC: excellent  Goals  STG 2-4 weeks  Increase BLE strength 5-10 lbs  Decrease pain to <5/10 with activity  Increase standing/walking tolerance to >30 minutes  Patient independent with HEP    LTG 6-8 weeks  Increase BLE strength 10-20 lbs  Decrease pain to <2/10 with activity  Increase single leg stance >30 seconds  Increase standing/walking tolerance to >90 minutes    Plan  Planned modality interventions: unattended electrical stimulation  Planned therapy interventions: joint mobilization, manual therapy, patient education, strengthening, stretching, therapeutic activities, therapeutic exercise, therapeutic training, transfer training, gait training, flexibility, coordination, balance and balance/weight bearing training  Frequency: 3x week  Duration in weeks: 6      Subjective Evaluation    History of Present Illness  Not a recurrent problem   Quality of life: excellent    Pain  Quality: discomfort, dull ache, sharp, tight and squeezing  Relieving factors: change in position, heat, ice, relaxation, rest and medications  Aggravating factors: running, lifting, standing, walking and stair climbing  Progression: improved    Treatments  Current treatment: physical therapy  Patient Goals  Patient goals for therapy: decreased pain, improved balance, increased motion, return to work, return to [employer] activities, independence with ADLs/IADLs and increased strength        Objective    Flowsheet Rows      Most Recent Value   PT/OT G-Codes   Assessment Type  Re-evaluation   G code set  Mobility: Walking & Moving Around   Mobility: Walking and Moving Around Current Status ()  CI   Mobility: Walking and Moving Around Goal Status ()  [facility]        Date of onset:  01/16/18     Date of Surgery:  01/16/18    History of Present Episode: 5/7/2018  Gema Gus states his knee deteriorated and he went for a TKR Sx  Past Medical History:    5/7/2018  Gema Weber reports chronic knee issues  Pervious Level of Functional Ability:  5/7/2018  Gema Gus states steady decline with use of his knee region  Inspection / Palpation:  5/7/2018  Mesomorphic / endomorphic Body Type  No signs of infection  No signs of wounds  No signs of drainage  No signs of ecchymotic regions  No signs of erythremic regions  Mild signs of muscle spasm  Mild signs of muscle guarding  Mild to moderate signs of tenderness reported to palpation  Mild signs of swelling  Positive signs of a surgery site  Current conditions appear consistent with recent episode  Chief Complaints:  5/7/2018  Bjorn reports mild to moderate difficulty with standing  Gema Weber reports mild to moderate difficulty with walking  Gema Weber reports mild to moderate diffficulty with movement / use of his knee  Gema Weber reports mild to moderate difficulty with use of stairs  Gema Weber reports mild to moderate difficulty with use of inclines  Gema Weber reports mild to moderate difficulty with sleeping  Gema Weber reports mild to moderate difficulty with his strength and endurance  Gema Weber reports mild to moderate limitations with his range of motion  Gema Weber reports mild to moderate difficulty lying on his knee region  5/31/2018 Bjorn reports mild difficulty with standing  Gema Weber reports mild difficulty with walking  Gema Weber reports mild diffficulty with movement / use of his right knee    Gema Weber reports mild difficulty with use of stairs  Belle Pickup reports mild difficulty with use of inclines  Belle Elizabethup reports mild difficulty with sleeping  Belle Ortiz reports mild difficulty with his strength and endurance  Belle Ortiz reports mild limitations with his range of motion  Belle Ortiz reports mild difficulty lying on his right knee region      KNEE PAIN Resting Moving Palpation Standing Walking Running   5/7/2018 Lt 0 0 0 0 0 0   5/7/2018 Rt 0-4 0-5 0-6 0-5 0-5 NA   5/31/2018 Rt 0-2 0-2 0-3 0-3 0-3 NA     KNEE PAIN Stairs Sleeping Twisting Jumping   5/7/2018 Lt 0 0 0 0   5/7/2018 Rt 2-5 0-6 3-6 NA   5/31/2018 Rt 1-3 0-3 1-3 NA     KNEE AROM Flexion Extension SLR   5/7/2018 Lt 125° 0° 85°   5/7/2018 Rt 103° 2° 72°   5/31/2018 Rt 114° 1° 79°     KNEE PROM Flexion Extension SLR   5/7/2018 Lt 130° 0° 90°   5/7/2018 Rt 106° 0° 78°   5/31/18 Rt 117° 0° 82°     KNEE MMT Flexion Extension Varus Stress Valgus Stress   5/7/2018 Lt 0/10  42 lbs 0/10  41 lbs 0/10  39 lbs 0/10  37 lbs   5/7/2018 Rt 3/10  27 lbs 5/10  25 lbs 4/10  19 lbs 4/10  20 lbs   5/31/18 Rt 1/10  32 lbs 3/10  31 lbs 2/10  24 lbs 2/10  27 lbs     Precautions: Right TKR

## 2018-06-07 ENCOUNTER — APPOINTMENT (OUTPATIENT)
Dept: PHYSICAL THERAPY | Facility: CLINIC | Age: 68
End: 2018-06-07
Payer: MEDICARE

## 2018-06-12 ENCOUNTER — OFFICE VISIT (OUTPATIENT)
Dept: PHYSICAL THERAPY | Facility: CLINIC | Age: 68
End: 2018-06-12
Payer: MEDICARE

## 2018-06-12 DIAGNOSIS — Z96.651 STATUS POST TOTAL RIGHT KNEE REPLACEMENT: Primary | ICD-10-CM

## 2018-06-12 DIAGNOSIS — M25.561 ACUTE PAIN OF RIGHT KNEE: ICD-10-CM

## 2018-06-12 DIAGNOSIS — S83.8X1D SPRAIN OF OTHER LIGAMENT OF RIGHT KNEE, SUBSEQUENT ENCOUNTER: ICD-10-CM

## 2018-06-12 DIAGNOSIS — R26.2 DIFFICULTY WALKING: ICD-10-CM

## 2018-06-12 PROCEDURE — 97110 THERAPEUTIC EXERCISES: CPT

## 2018-06-12 PROCEDURE — 97140 MANUAL THERAPY 1/> REGIONS: CPT

## 2018-06-12 PROCEDURE — G8979 MOBILITY GOAL STATUS: HCPCS | Performed by: PHYSICAL THERAPIST

## 2018-06-12 PROCEDURE — G8978 MOBILITY CURRENT STATUS: HCPCS | Performed by: PHYSICAL THERAPIST

## 2018-06-12 PROCEDURE — 97014 ELECTRIC STIMULATION THERAPY: CPT

## 2018-06-12 NOTE — PROGRESS NOTES
Today's date: 2018  Patient name: Wilfredo King  : 1950  MRN: [id number]  Referring provider: Belgica Garzon MD  Dx:   Encounter Diagnosis     ICD-10-CM    1  Status post total right knee replacement Z96 651    2  Sprain of other ligament of right knee, subsequent encounter S83 8X1D    3  Acute pain of right knee M25 561    4  Difficulty walking R26 2      Subjective: No new c/o pain today  "I'm not doing my walking at home like I used to, but I am able to mow my lawn  I just don't want to push it because I've been doing better and I've also noticed an increase in my ROM "    Objective: See treatment diary below    Assessment: Tolerated treatment well  Patient exhibited good technique with therapeutic exercises and would benefit from continued PT    Plan: Continue per plan of care  Progress treatment as tolerated         Manual        MT 15'               Exercise Diary         Balance Board 30x       Around facility-Gait Training 30x       T/G Squats/PF 30x       WP-Hip ABD/ADD&Flex/Ext 35#-30x       Bike 10'       Maria R-PF stretch 4'       NK Table Ex-Flex/Ext        NK Table Stretch        Trimax-TKE 30x        E-Trbf-OL-Forward, Backwards, Side - Level & Dips 6x ea        ME, PE 15'       Maria R-Abs        Wall Sit 20sec       Modalities        MH/ES 20'       VAS/ES

## 2018-06-14 ENCOUNTER — OFFICE VISIT (OUTPATIENT)
Dept: PHYSICAL THERAPY | Facility: CLINIC | Age: 68
End: 2018-06-14
Payer: MEDICARE

## 2018-06-14 DIAGNOSIS — S83.8X1D SPRAIN OF OTHER LIGAMENT OF RIGHT KNEE, SUBSEQUENT ENCOUNTER: ICD-10-CM

## 2018-06-14 DIAGNOSIS — R26.2 DIFFICULTY WALKING: ICD-10-CM

## 2018-06-14 DIAGNOSIS — M25.561 ACUTE PAIN OF RIGHT KNEE: ICD-10-CM

## 2018-06-14 DIAGNOSIS — Z96.651 STATUS POST TOTAL RIGHT KNEE REPLACEMENT: Primary | ICD-10-CM

## 2018-06-14 PROCEDURE — 97140 MANUAL THERAPY 1/> REGIONS: CPT

## 2018-06-14 PROCEDURE — 97110 THERAPEUTIC EXERCISES: CPT

## 2018-06-14 PROCEDURE — 97014 ELECTRIC STIMULATION THERAPY: CPT

## 2018-06-14 NOTE — PROGRESS NOTES
Daily Note     Today's date: 2018  Patient name: Tyesha Hunter  : 1950  MRN: [id number]  Referring provider: Daniel Ross MD  Dx:   Encounter Diagnosis     ICD-10-CM    1  Status post total right knee replacement Z96 651    2  Sprain of other ligament of right knee, subsequent encounter S83 8X1D    3  Acute pain of right knee M25 561    4  Difficulty walking R26 2      Subjective: No new c/o pain today  "I'm sleeping well now and just about able to walk down the steps like I used to "    Objective: See treatment diary below    Assessment: Tolerated treatment well  Patient exhibited good technique with therapeutic exercises and would benefit from continued PT  Patient tolerated all exs and txs well with no increase in symptoms  Plan: Continue per plan of care  Progress treatment as tolerated        Manual       MT 15' 15'              Exercise Diary        Balance Board 30x 30x      Around facility-Gait Training 30x 5'      T/G Squats/PF 30x 30x      WP-Hip ABD/ADD&Flex/Ext 35#-30x 35# 30x ea      Bike 10' 10'      Maria R-PF stretch 4' 4'      NK Table Ex-Flex/Ext        NK Table Stretch        Trimax-TKE 30x 30x       A-Sgjt-EY-Forward, Backwards, Side - Level & Dips 6x Milton Huang PE 15' 15'      Maria R-Abs        Wall Sit 20sec HEP      Modalities       MH/ES 20' 20'      VAS/ES

## 2018-06-19 ENCOUNTER — OFFICE VISIT (OUTPATIENT)
Dept: PHYSICAL THERAPY | Facility: CLINIC | Age: 68
End: 2018-06-19
Payer: MEDICARE

## 2018-06-19 DIAGNOSIS — Z96.651 STATUS POST TOTAL RIGHT KNEE REPLACEMENT: Primary | ICD-10-CM

## 2018-06-19 DIAGNOSIS — M25.561 ACUTE PAIN OF RIGHT KNEE: ICD-10-CM

## 2018-06-19 DIAGNOSIS — R26.2 DIFFICULTY WALKING: ICD-10-CM

## 2018-06-19 DIAGNOSIS — S83.8X1D SPRAIN OF OTHER LIGAMENT OF RIGHT KNEE, SUBSEQUENT ENCOUNTER: ICD-10-CM

## 2018-06-19 PROCEDURE — 97140 MANUAL THERAPY 1/> REGIONS: CPT

## 2018-06-19 PROCEDURE — 97110 THERAPEUTIC EXERCISES: CPT

## 2018-06-19 PROCEDURE — 97014 ELECTRIC STIMULATION THERAPY: CPT

## 2018-06-19 NOTE — PROGRESS NOTES
Daily Note     Today's date: 2018  Patient name: Sherrill Armstrong  : 1950  MRN: [id number]  Referring provider: Yolanda Herrera MD  Dx:   Encounter Diagnosis     ICD-10-CM    1  Status post total right knee replacement Z96 651    2  Sprain of other ligament of right knee, subsequent encounter S83 8X1D    3  Acute pain of right knee M25 561    4  Difficulty walking R26 2      Subjective: No new c/o pain today  "I feel so much better  I'm still feeling the benefits from physical therapy; my R knee now bends further than my L!"  Patient reports a pain scale level under normal circumstances/everyday activity at 1/10  But patient also reports a pain level scale of 7/10 if he over does it at home  Objective: See treatment diary below    Assessment: Tolerated treatment well  Patient exhibited good technique with therapeutic exercises and would benefit from continued PT  Patient continuing to progress nicely with increased AROM and decreased discomfort  Plan: Continue per plan of care  Progress treatment as tolerated        Manual      MT 15' 15' 15'             Exercise Diary       Balance Board 30x 30x 30x     Around facility-Gait Training 30x 5' 5'     T/G Squats/PF 30x 30x 30x     WP-Hip ABD/ADD&Flex/Ext 35#-30x 35# 30x ea 35# 30x ea     Bike 10' 10' 10'     Maria R-PF stretch 4' 4' 4'     NK Table Ex-Flex/Ext        NK Table Stretch        Trimax-TKE 30x 30x 30x      U-Ffui-CN-Forward, Backwards, Side - Level & Dips 6x ea 6x Shira Bergman PE 15' 15' 15'     Maria R-Abs        Wall Sit 20sec HEP      Modalities      MH/ES 20' 20' 20'     VAS/ES

## 2018-06-21 ENCOUNTER — APPOINTMENT (OUTPATIENT)
Dept: PHYSICAL THERAPY | Facility: CLINIC | Age: 68
End: 2018-06-21
Payer: MEDICARE

## 2018-06-28 ENCOUNTER — OFFICE VISIT (OUTPATIENT)
Dept: PHYSICAL THERAPY | Facility: CLINIC | Age: 68
End: 2018-06-28
Payer: MEDICARE

## 2018-06-28 DIAGNOSIS — M25.561 ACUTE PAIN OF RIGHT KNEE: ICD-10-CM

## 2018-06-28 DIAGNOSIS — Z96.651 STATUS POST TOTAL RIGHT KNEE REPLACEMENT: Primary | ICD-10-CM

## 2018-06-28 DIAGNOSIS — R26.2 DIFFICULTY WALKING: ICD-10-CM

## 2018-06-28 DIAGNOSIS — S83.8X1D SPRAIN OF OTHER LIGAMENT OF RIGHT KNEE, SUBSEQUENT ENCOUNTER: ICD-10-CM

## 2018-06-28 PROCEDURE — 97110 THERAPEUTIC EXERCISES: CPT

## 2018-06-28 PROCEDURE — 97140 MANUAL THERAPY 1/> REGIONS: CPT

## 2018-06-28 NOTE — LETTER
2018    Tomeka Cuello MD  [address]  [employer]  [address]    Patient: Martin Vergara  YOB: 1950   Date of Visit: 2018     Encounter Diagnosis     ICD-10-CM    1  Status post total right knee replacement Z96 651    2  Sprain of other ligament of right knee, subsequent encounter S83 8X1D    3  Acute pain of right knee M25 561    4  Difficulty walking R26 2        PT Discharge    Dear Dr Dawson Winchester:    Please review the attached Plan of Care from [facility] recent visit  Please verify that you agree therapy should discontinue by signing the attached document and sending it back to our office  Martin Vergara  has not returned for more treatment since last month  Martin Vergara  is discharged at this time  If you have any questions or concerns, please don't hesitate to call  Sincerely,    Jacinta Moody PT      Referring Provider:      I certify that I have read the below Plan of Care and certify the need for these services furnished under this plan of treatment while under my care  Tomeka Cuello MD  [address]  [employer]  [address] [place] [address] Road: [phone]          Daily Note     Today's date: 2018  Patient name: Martin Vergara  : 1950  MRN: [id number]  Referring provider: Oksana Coker MD  Dx:   Encounter Diagnosis     ICD-10-CM    1  Status post total right knee replacement Z96 651    2  Sprain of other ligament of right knee, subsequent encounter S83 8X1D    3  Acute pain of right knee M25 561    4  Difficulty walking R26 2      Subjective: No new c/o pain today  "I was able to walk home from the garage the other day  It took me 26 minutes to walk home even with a hill to climb and to descend  I did well with no pain afterwards!   That makes me feel confident enough to try a walking program at home and try working out at home too "    Objective: See treatment diary below    Assessment: Tolerated treatment well  Patient exhibited good technique with therapeutic exercises and would benefit from continued PT  Patient pleased with his progress since starting therapy  "Therapy has really helped me! I can't believe how far I've come! I had a rough start "    Plan: Continue per plan of care  Progress treatment as tolerated  Manual     MT 15' 15' 15' 15'            Exercise Diary      Balance Board 30x 30x 30x 30x    Around facility-Gait Training 30x 5' 5' 5'    T/G Squats/PF 30x 30x 30x 30x    WP-Hip ABD/ADD&Flex/Ext 35#-30x 35# 30x ea 35# 30x ea     Bike 10' 10' 10'     Maria R-PF stretch 4' 4' 4' 4'    NK Table Ex-Flex/Ext        NK Table Stretch    10' x 2    Trimax-TKE 30x 30x 30x      G-Wkzc-NO-Forward, Backwards, Side - Level & Dips 6x ea 6x ea 6x Hanna Fam, PE 15' 15' 15' 15'    Maria R-Abs        Wall Sit 20sec HEP      Modalities     MH/ES 20' 20' 20' NT    VAS/ES                      PT Discharge    Today's date: 2018  Patient name: Sylvia Rosas  : 1950  MRN: [id number]  Referring provider: Angel Agee MD  Dx:   Encounter Diagnosis     ICD-10-CM    1  Status post total right knee replacement Z96 651    2  Sprain of other ligament of right knee, subsequent encounter S83 8X1D    3  Acute pain of right knee M25 561    4  Difficulty walking R26 2        Start Time: 0945  Stop Time: 1045  Total time in clinic (min): 60 minutes    Assessment/Plan    Subjective    Objective    Precautions: R TKR    Sylvia Rosas  has not returned for more treatment since last month  Sylvia Rosas  is discharged at this time

## 2018-06-28 NOTE — PROGRESS NOTES
Daily Note     Today's date: 2018  Patient name: Donte Garrison  : 1950  MRN: [id number]  Referring provider: Emmanuel Jorgensen MD  Dx:   Encounter Diagnosis     ICD-10-CM    1  Status post total right knee replacement Z96 651    2  Sprain of other ligament of right knee, subsequent encounter S83 8X1D    3  Acute pain of right knee M25 561    4  Difficulty walking R26 2      Subjective: No new c/o pain today  "I was able to walk home from the garage the other day  It took me 26 minutes to walk home even with a hill to climb and to descend  I did well with no pain afterwards! That makes me feel confident enough to try a walking program at home and try working out at home too "    Objective: See treatment diary below    Assessment: Tolerated treatment well  Patient exhibited good technique with therapeutic exercises and would benefit from continued PT  Patient pleased with his progress since starting therapy  "Therapy has really helped me! I can't believe how far I've come! I had a rough start "    Plan: Continue per plan of care  Progress treatment as tolerated        Manual     MT 15' 15' 15' 15'            Exercise Diary      Balance Board 30x 30x 30x 30x    Around facility-Gait Training 30x 5' 5' 5'    T/G Squats/PF 30x 30x 30x 30x    WP-Hip ABD/ADD&Flex/Ext 35#-30x 35# 30x ea 35# 30x ea     Bike 10' 10' 10'     Maria R-PF stretch 4' 4' 4' 4'    NK Table Ex-Flex/Ext        NK Table Stretch    10' x 2    Trimax-TKE 30x 30x 30x      P-Tfhr-PG-Forward, Backwards, Side - Level & Dips 6x ea 6x ea 6x ea 6x Estevan Ayala PE 15' 15' 15' 15'    Maria R-Abs        Wall Sit 20sec HEP      Modalities     MH/ES 20' 20' 20' NT    VAS/ES

## 2018-07-16 ENCOUNTER — TRANSCRIBE ORDERS (OUTPATIENT)
Dept: PHYSICAL THERAPY | Facility: CLINIC | Age: 68
End: 2018-07-16

## 2018-07-16 DIAGNOSIS — M25.561 ACUTE PAIN OF RIGHT KNEE: ICD-10-CM

## 2018-07-16 DIAGNOSIS — Z96.651 STATUS POST TOTAL RIGHT KNEE REPLACEMENT: Primary | ICD-10-CM

## 2018-07-16 DIAGNOSIS — R26.2 DIFFICULTY WALKING: ICD-10-CM

## 2018-07-16 DIAGNOSIS — S83.8X1D SPRAIN OF OTHER LIGAMENT OF RIGHT KNEE, SUBSEQUENT ENCOUNTER: ICD-10-CM

## 2018-07-16 NOTE — PROGRESS NOTES
PT Discharge    Today's date: 2018  Patient name: Trudi Gary  : 1950  MRN: [id number]  Referring provider: Reyna Rich MD  Dx:   Encounter Diagnosis     ICD-10-CM    1  Status post total right knee replacement Z96 651    2  Sprain of other ligament of right knee, subsequent encounter S83 8X1D    3  Acute pain of right knee M25 561    4  Difficulty walking R26 2        Start Time: 0945  Stop Time: 1045  Total time in clinic (min): 60 minutes    Assessment/Plan    Subjective    Objective    Precautions: R TKR    Trudi Gary  has not returned for more treatment since last month  Trudi Gary  is discharged at this time

## 2019-09-19 ENCOUNTER — EVALUATION (OUTPATIENT)
Dept: PHYSICAL THERAPY | Facility: CLINIC | Age: 69
End: 2019-09-19
Payer: MEDICARE

## 2019-09-19 ENCOUNTER — TRANSCRIBE ORDERS (OUTPATIENT)
Dept: PHYSICAL THERAPY | Facility: CLINIC | Age: 69
End: 2019-09-19

## 2019-09-19 DIAGNOSIS — R26.2 DIFFICULTY WALKING: ICD-10-CM

## 2019-09-19 DIAGNOSIS — Z96.651 AFTERCARE FOLLOWING RIGHT KNEE JOINT REPLACEMENT SURGERY: Primary | ICD-10-CM

## 2019-09-19 DIAGNOSIS — Z47.1 AFTERCARE FOLLOWING RIGHT KNEE JOINT REPLACEMENT SURGERY: Primary | ICD-10-CM

## 2019-09-19 DIAGNOSIS — M25.561 ACUTE PAIN OF RIGHT KNEE: ICD-10-CM

## 2019-09-19 PROCEDURE — 97535 SELF CARE MNGMENT TRAINING: CPT | Performed by: PHYSICAL THERAPIST

## 2019-09-19 PROCEDURE — 97110 THERAPEUTIC EXERCISES: CPT | Performed by: PHYSICAL THERAPIST

## 2019-09-19 PROCEDURE — 97162 PT EVAL MOD COMPLEX 30 MIN: CPT | Performed by: PHYSICAL THERAPIST

## 2019-09-19 NOTE — LETTER
2019  Signature Required / Plan of Care / PT Evaluation  Gabriela Pena MD  [address] [facility]  [address] [place] [address]    Patient: Mateus Moore  YOB: 1950   Date of Visit: 2019     Encounter Diagnosis     ICD-10-CM    1  Aftercare following right knee joint replacement surgery Z47 1     Z96 651    2  Difficulty walking R26 2    3  Acute pain of right knee M25 561      Dear Dr Rodney Barthel:   Thank you for your recent referral of Mateus Drop    Please review the attached evaluation summary from Bjorn's recent visit  Please verify that you agree with the plan of care by signing the attached order  If you have any questions or concerns, please do not hesitate to call  I sincerely appreciate the opportunity to share in the care of one of your patients and hope to have another opportunity to work with you in the near future  Sincerely,    Laith Nielsen, PT    Referring Provider:    I certify that I have read the below Plan of Care and certify the need for these services furnished under this plan of treatment while under my care  Gabriela Pena MD  [address] [facility]  [address] [place] [address]  VIA Facsimile: [phone]    Please sign above and return this page to fax number [phone]        PT Evaluation   Today's date: 2019  Patient name: Mateus Moore  : 1950  MRN: [id number]  Referring provider: Tiesha Hopper MD  Dx:   Encounter Diagnosis     ICD-10-CM    1  Aftercare following right knee joint replacement surgery Z47 1     Z96 651    2  Difficulty walking R26 2    3  Acute pain of right knee M25 561      Assessment/Plan    Subjective    Objective    Date of onset:  9/10/2019    Date of Surgery:  9/10/2019    History of Present Episode: 2019  Светлана Alonzo states his original Right Total Knee Replacement became infected  He just had his old R TKR SX removed and installed a new R TKR SX      Past Medical History: 9/19/2019  Bjorn reports R TKR SX   Neck Sx  Pain still in buttocks  L TKR SX     Previous Level of Functional Ability:  9/19/2019  Elina Bello states his right knee became worse and worse and he finally went for a R TKR SX replacement  Inspection / Palpation:  Knee:  9/19/2019  Mesomorphic / Endomorphic body type  No signs of infection  No signs of wounds  Mild signs of drainage  Moderate signs of ecchymotic regions  Moderate signs of erythremic regions  Moderate signs of muscle spasm  Moderate signs of muscle guarding  Moderate signs of tenderness reported to palpation  Moderate signs of swelling  Positive signs of a surgery site  Current conditions appears consistent with recent acute R TKR SX episode  Chief Complaints:  9/19/2019  Bjorn reports moderate to severe difficulty with standing  Elina Rivafani reports moderate to severe difficulty with walking  Elinaslade Bello reports moderate to severe difficulty with movement / use of his right knee  Elina Rival reports moderate to severe difficulty with use of stairs  Elina Rival reports severe difficulty with running  Elina Rival reports severe difficulty with jumping  Elina Rival reports moderate to severe difficulty with use of inclines  Elina Rival reports moderate difficulty with sleeping  Elina Breannal reports moderate to severe difficulty with his strength and endurance  Elina Breannal reports moderate to severe limitations with his range of motion  Elina Breannal reports moderate to severe difficulty lying on his right knee region      KNEE PAIN Resting Moving Palpation Standing Walking   9/19/2019 Lt 0 0 0 0 0   9/19/2019 Rt 3-5 5-8 5-8 4-8 4-8     KNEE PAIN Running Stairs Sleeping Twisting Jumping   9/19/2019 Lt 0 0 0 0 0   9/19/2019 Rt NA 8-10 4-8 5-9 NA     KNEE AROM Flexion Extension SLR   9/19/2019 Lt 135° 0° 85°   9/19/2019 Rt 54° 12° 74°     KNEE MMT Flexion Extension Varus Stress Valgus Stress   9/19/2019 Lt 0/10  45 lbs 0/10  38 lbs 0/10  37 lbs 0/10  36 lbs   9/19/2019 Rt 7/10  6 lbs 7/10  3 lbs 7/10  5 lbs 7/10  4 lbs     Precautions:  R TKR SX - Replacement Repair / Replace    Daily Treatment Log  Manual  9/19       MT, ROM        HEP 15'       Exercise Log 9/19       Balance Board        Chair Squats        P-Bar-GT-Forward, Backward,Side-Even & Dips        BOSU-Walk        Foam Pad SLR,Hip/KneeFl,Step Ups        Foam Beam        Fitter-Slalom        Monster Steps        BAPS- L-2        T/G-Squats PF        W/P-Hip-Abd,Add,Flex,Ext        WP-Squats        Trimax-TKE        Xjplxcc-SV-Ey        NK Table Exer        NK Table ROM        TM        Stepper        Bike 10'       ME, PE 15'               Modalities 9/19       MH&ES        US

## 2019-09-19 NOTE — PROGRESS NOTES
PT Evaluation   Today's date: 2019  Patient name: Joe Mccartney  : 1950  MRN: [id number]  Referring provider: Andres Jackson MD  Dx:   Encounter Diagnosis     ICD-10-CM    1  Aftercare following right knee joint replacement surgery Z47 1     Z96 651    2  Difficulty walking R26 2    3  Acute pain of right knee M25 561      Assessment/Plan    Subjective    Objective    Date of onset:  9/10/2019    Date of Surgery:  9/10/2019    History of Present Episode: 2019  Laxmi Levon states his original Right Total Knee Replacement became infected  He just had his old R TKR SX removed and installed a new R TKR SX  Past Medical History:    2019  Laxmi Dos Santos reports R TKR SX   Neck Sx  Pain still in buttocks  L TKR SX     Previous Level of Functional Ability:  2019  Laxmi Dos Santos states his right knee became worse and worse and he finally went for a R TKR SX replacement  Inspection / Palpation:  Knee:  2019  Mesomorphic / Endomorphic body type  No signs of infection  No signs of wounds  Mild signs of drainage  Moderate signs of ecchymotic regions  Moderate signs of erythremic regions  Moderate signs of muscle spasm  Moderate signs of muscle guarding  Moderate signs of tenderness reported to palpation  Moderate signs of swelling  Positive signs of a surgery site  Current conditions appears consistent with recent acute R TKR SX episode  Chief Complaints:  2019  Bjorn reports moderate to severe difficulty with standing  Laxmi Dos Santos reports moderate to severe difficulty with walking  Laxmi Dos Santos reports moderate to severe difficulty with movement / use of his right knee  Laxmi Dos Santos reports moderate to severe difficulty with use of stairs  Laxmi Dos Santos reports severe difficulty with running  Laxmi Dos Santos reports severe difficulty with jumping  Laxmi Dos Santos reports moderate to severe difficulty with use of inclines  Laxmi Dos Santos reports moderate difficulty with sleeping    Laxmi Dos Santos reports moderate to severe difficulty with his strength and endurance  Laxmi Dos Santos reports moderate to severe limitations with his range of motion  Laxmi Dos Santos reports moderate to severe difficulty lying on his right knee region      KNEE PAIN Resting Moving Palpation Standing Walking   9/19/2019 Lt 0 0 0 0 0   9/19/2019 Rt 3-5 5-8 5-8 4-8 4-8     KNEE PAIN Running Stairs Sleeping Twisting Jumping   9/19/2019 Lt 0 0 0 0 0   9/19/2019 Rt NA 8-10 4-8 5-9 NA     KNEE AROM Flexion Extension SLR   9/19/2019 Lt 135° 0° 85°   9/19/2019 Rt 54° 12° 74°     KNEE MMT Flexion Extension Varus Stress Valgus Stress   9/19/2019 Lt 0/10  45 lbs 0/10  38 lbs 0/10  37 lbs 0/10  36 lbs   9/19/2019 Rt 7/10  6 lbs 7/10  3 lbs 7/10  5 lbs 7/10  4 lbs     Precautions:  R TKR SX - Replacement Repair / Replace    Daily Treatment Log  Manual  9/19       MT, ROM        HEP 15'       Exercise Log 9/19       Balance Board        Chair Squats        P-Bar-GT-Forward, Backward,Side-Even & Dips        BOSU-Walk        Foam Pad SLR,Hip/KneeFl,Step Ups        Foam Beam        Fitter-Slalom        Monster Steps        BAPS- L-2        T/G-Squats PF        W/P-Hip-Abd,Add,Flex,Ext        WP-Squats        Trimax-TKE        Xbniwts-CS-Kv        NK Table Exer        NK Table ROM        TM        Stepper        Bike 10'       ME, PE 15'               Modalities 9/19       &ES        US

## 2019-09-20 ENCOUNTER — OFFICE VISIT (OUTPATIENT)
Dept: PHYSICAL THERAPY | Facility: CLINIC | Age: 69
End: 2019-09-20
Payer: MEDICARE

## 2019-09-20 DIAGNOSIS — R26.2 DIFFICULTY WALKING: ICD-10-CM

## 2019-09-20 DIAGNOSIS — Z47.1 AFTERCARE FOLLOWING RIGHT KNEE JOINT REPLACEMENT SURGERY: Primary | ICD-10-CM

## 2019-09-20 DIAGNOSIS — M25.561 ACUTE PAIN OF RIGHT KNEE: ICD-10-CM

## 2019-09-20 DIAGNOSIS — Z96.651 AFTERCARE FOLLOWING RIGHT KNEE JOINT REPLACEMENT SURGERY: Primary | ICD-10-CM

## 2019-09-20 PROCEDURE — 97116 GAIT TRAINING THERAPY: CPT | Performed by: PHYSICAL THERAPIST

## 2019-09-20 PROCEDURE — 97140 MANUAL THERAPY 1/> REGIONS: CPT | Performed by: PHYSICAL THERAPIST

## 2019-09-20 PROCEDURE — 97112 NEUROMUSCULAR REEDUCATION: CPT | Performed by: PHYSICAL THERAPIST

## 2019-09-20 NOTE — PROGRESS NOTES
Today's date: 2019  Patient name: Benita Crum  : 1950  MRN: [id number]  Referring provider: Edie Auguste MD  Dx:   Encounter Diagnosis     ICD-10-CM    1  Aftercare following right knee joint replacement surgery Z47 1     Z96 651    2  Difficulty walking R26 2    3  Acute pain of right knee M25 561      Subjective:  His right knee is getting better  Objective: See treatment log below    Assessment: Tolerated treatment well  Patient exhibited good technique with therapeutic exercises and would benefit from continued PT    Plan: Continue per plan of care  Progress treatment as tolerated         Precautions:  R TKR SX - Replacement Repair / Replace    Daily Treatment Log  Manual        MT, ROM  15'      HEP 15'       Exercise Log       Balance Board  30x      Chair Squats        P-Bar-GT-Forward, Backward,Side-Even & Dips        BOSU-Walk        Foam Pad SLR,Hip/KneeFl,Step Ups        Foam Beam        Fitter-Slalom        Monster Steps        BAPS- L-2        T/G-Squats PF  30x      W/P-Hip-Abd,Add,Flex,Ext  5 0#-15x      WP-Squats        Trimax-TKE        Udinprk-HR-Jw        NK Table Exer        NK Table ROM        TM        Stepper        Bike 10' 10'      ME, PE 13' 15'              Modalities       MH&ES        US
16-Mar-2018 22:33

## 2019-09-23 ENCOUNTER — OFFICE VISIT (OUTPATIENT)
Dept: PHYSICAL THERAPY | Facility: CLINIC | Age: 69
End: 2019-09-23
Payer: MEDICARE

## 2019-09-23 DIAGNOSIS — R26.2 DIFFICULTY WALKING: ICD-10-CM

## 2019-09-23 DIAGNOSIS — Z47.1 AFTERCARE FOLLOWING RIGHT KNEE JOINT REPLACEMENT SURGERY: Primary | ICD-10-CM

## 2019-09-23 DIAGNOSIS — M25.561 ACUTE PAIN OF RIGHT KNEE: ICD-10-CM

## 2019-09-23 DIAGNOSIS — Z96.651 AFTERCARE FOLLOWING RIGHT KNEE JOINT REPLACEMENT SURGERY: Primary | ICD-10-CM

## 2019-09-23 PROCEDURE — 97116 GAIT TRAINING THERAPY: CPT | Performed by: PHYSICAL THERAPIST

## 2019-09-23 PROCEDURE — 97140 MANUAL THERAPY 1/> REGIONS: CPT | Performed by: PHYSICAL THERAPIST

## 2019-09-23 PROCEDURE — 97112 NEUROMUSCULAR REEDUCATION: CPT | Performed by: PHYSICAL THERAPIST

## 2019-09-23 NOTE — PROGRESS NOTES
Today's date: 2019  Patient name: Sylvia Rosas  : 1950  MRN: [id number]  Referring provider: Eleonora Mensah MD  Dx:   Encounter Diagnosis     ICD-10-CM    1  Aftercare following right knee joint replacement surgery Z47 1     Z96 651    2  Difficulty walking R26 2    3  Acute pain of right knee M25 561      Subjective:  Lisa Gutierrez states his right knee is slowly feeling better  Objective: See treatment log below    Assessment: Tolerated treatment well  Patient exhibited good technique with therapeutic exercises and would benefit from continued PT    Plan: Continue per plan of care  Progress treatment as tolerated         Precautions:  R TKR SX - Replacement Repair / Replace    Daily Treatment Log  Manual       MT, ROM  15' 15'     HEP 15'       Exercise Log      Balance Board  30x 30x     Chair Squats   30x     P-Bar-GT-Forward, Backward,Side-Even & Dips   30x     BOSU-Walk   5'     Foam Pad SLR,Hip/KneeFl,Step Ups   30x     Foam Beam   10x     Fitter-Slalom        Monster Steps        BAPS- L-2        T/G-Squats PF  30x 30x     W/P-Hip-Abd,Add,Flex,Ext  5 0#-15x 5#-30x     WP-Squats        Trimax-TKE        Bepiaon-SA-Bp        NK Table Exer        NK Table ROM   5#-10' F/E     TM        Stepper        Bike 10' 10' 10'     ME, PE 15' 15' 15'             Modalities      MH&ES        US

## 2019-09-25 ENCOUNTER — OFFICE VISIT (OUTPATIENT)
Dept: PHYSICAL THERAPY | Facility: CLINIC | Age: 69
End: 2019-09-25
Payer: MEDICARE

## 2019-09-25 DIAGNOSIS — R26.2 DIFFICULTY WALKING: ICD-10-CM

## 2019-09-25 DIAGNOSIS — Z96.651 AFTERCARE FOLLOWING RIGHT KNEE JOINT REPLACEMENT SURGERY: Primary | ICD-10-CM

## 2019-09-25 DIAGNOSIS — M25.561 ACUTE PAIN OF RIGHT KNEE: ICD-10-CM

## 2019-09-25 DIAGNOSIS — Z47.1 AFTERCARE FOLLOWING RIGHT KNEE JOINT REPLACEMENT SURGERY: Primary | ICD-10-CM

## 2019-09-25 PROCEDURE — 97112 NEUROMUSCULAR REEDUCATION: CPT | Performed by: PHYSICAL THERAPIST

## 2019-09-25 PROCEDURE — 97116 GAIT TRAINING THERAPY: CPT | Performed by: PHYSICAL THERAPIST

## 2019-09-25 PROCEDURE — 97140 MANUAL THERAPY 1/> REGIONS: CPT | Performed by: PHYSICAL THERAPIST

## 2019-09-25 NOTE — PROGRESS NOTES
Today's date: 2019  Patient name: Benita Crum  : 1950  MRN: [id number]  Referring provider: Edie Auguste MD  Dx:   Encounter Diagnosis     ICD-10-CM    1  Aftercare following right knee joint replacement surgery Z47 1     Z96 651    2  Difficulty walking R26 2    3  Acute pain of right knee M25 561      Subjective:  Rosanna Shearer states his right knee is slowly improving but he still has a lot of pain and limitations  Objective: See treatment log below    Assessment: Tolerated treatment well  Patient exhibited good technique with therapeutic exercises and would benefit from continued PT    Plan: Continue per plan of care  Progress treatment as tolerated         Precautions:  R TKR SX - Replacement Repair / Replace    Daily Treatment Log  Manual      MT, ROM  15' 15' 15'    HEP 15'       Exercise Log     Balance Board  30x 30x 30x    Chair Squats   30x 30x    P-Bar-GT-Forward, Backward,Side-Even & Dips   30x 30x    BOSU-Walk   5' 5'    Foam Pad SLR,Hip/KneeFl,Step Ups   30x 30x    Foam Beam   10x 10x    Fitter-Slalom        Monster Steps        BAPS- L-2        T/G-Squats PF  30x 30x 30x    W/P-Hip-Abd,Add,Flex,Ext  5 0#-15x 5#-30x 5#-30x    WP-Squats        Trimax-TKE        Daybkgc-VM-Pv        NK Table Exer        NK Table ROM   5#-10' F/E 5#-10'    TM        Stepper        Bike 10' 10' 10' 10'    ME, PE 13' 15' 15' 15'            Modalities     MH&ES        US

## 2019-09-27 ENCOUNTER — OFFICE VISIT (OUTPATIENT)
Dept: PHYSICAL THERAPY | Facility: CLINIC | Age: 69
End: 2019-09-27
Payer: MEDICARE

## 2019-09-27 DIAGNOSIS — Z96.651 AFTERCARE FOLLOWING RIGHT KNEE JOINT REPLACEMENT SURGERY: Primary | ICD-10-CM

## 2019-09-27 DIAGNOSIS — M25.561 ACUTE PAIN OF RIGHT KNEE: ICD-10-CM

## 2019-09-27 DIAGNOSIS — Z47.1 AFTERCARE FOLLOWING RIGHT KNEE JOINT REPLACEMENT SURGERY: Primary | ICD-10-CM

## 2019-09-27 DIAGNOSIS — R26.2 DIFFICULTY WALKING: ICD-10-CM

## 2019-09-27 PROCEDURE — 97112 NEUROMUSCULAR REEDUCATION: CPT | Performed by: PHYSICAL THERAPIST

## 2019-09-27 PROCEDURE — 97140 MANUAL THERAPY 1/> REGIONS: CPT | Performed by: PHYSICAL THERAPIST

## 2019-09-27 PROCEDURE — 97116 GAIT TRAINING THERAPY: CPT | Performed by: PHYSICAL THERAPIST

## 2019-09-27 NOTE — PROGRESS NOTES
Today's date: 2019  Patient name: Waldo Current  : 1950  MRN: [id number]  Referring provider: Scott Downey MD  Dx:   Encounter Diagnosis     ICD-10-CM    1  Aftercare following right knee joint replacement surgery Z47 1     Z96 651    2  Difficulty walking R26 2    3  Acute pain of right knee M25 561      Subjective:  Virginia Miller states his right knee is very tender today  He is going easier today with treatment  He is in just too much pain  Objective: See treatment log below    Assessment: Tolerated treatment well  Patient exhibited good technique with therapeutic exercises and would benefit from continued PT    Plan: Continue per plan of care  Progress treatment as tolerated         Precautions:  R TKR SX - Replacement Repair / Replace    Daily Treatment Log  Manual     MT, ROM  15' 15' 15' 15'   HEP 15'       Exercise Log    Balance Board  30x 30x 30x 30x   Chair Squats   30x 30x 30x   P-Bar-GT-Forward, Backward,Side-Even & Dips   30x 30x 30x   BOSU-Walk   5' 5' 5'   Foam Pad SLR,Hip/KneeFl,Step Ups   30x 30x 30x   Foam Beam   10x 10x 12x   Fitter-Slalom        Monster Steps        BAPS- L-2        T/G-Squats PF  30x 30x 30x 30x   W/P-Hip-Abd,Add,Flex,Ext  5 0#-15x 5#-30x 5#-30x 5#-30x   WP-Squats        Trimax-TKE        Nrlomga-WT-Tx        NK Table Exer        NK Table ROM   5#-10' F/E 5#-10' 0#-10'   TM        Stepper        Bike 10' 10' 10' 10' 10'   ME, PE 13' 15' 15' 15' 15'           Modalities    &ES        US

## 2019-10-01 ENCOUNTER — OFFICE VISIT (OUTPATIENT)
Dept: PHYSICAL THERAPY | Facility: CLINIC | Age: 69
End: 2019-10-01
Payer: MEDICARE

## 2019-10-01 DIAGNOSIS — R26.2 DIFFICULTY WALKING: ICD-10-CM

## 2019-10-01 DIAGNOSIS — Z47.1 AFTERCARE FOLLOWING RIGHT KNEE JOINT REPLACEMENT SURGERY: Primary | ICD-10-CM

## 2019-10-01 DIAGNOSIS — Z96.651 AFTERCARE FOLLOWING RIGHT KNEE JOINT REPLACEMENT SURGERY: Primary | ICD-10-CM

## 2019-10-01 DIAGNOSIS — M25.561 ACUTE PAIN OF RIGHT KNEE: ICD-10-CM

## 2019-10-01 PROCEDURE — 97112 NEUROMUSCULAR REEDUCATION: CPT | Performed by: PHYSICAL THERAPIST

## 2019-10-01 PROCEDURE — 97116 GAIT TRAINING THERAPY: CPT | Performed by: PHYSICAL THERAPIST

## 2019-10-01 PROCEDURE — 97140 MANUAL THERAPY 1/> REGIONS: CPT | Performed by: PHYSICAL THERAPIST

## 2019-10-01 NOTE — PROGRESS NOTES
Today's date: 10/1/2019  Patient name: Sonu Pacheco  : 1950  MRN: [id number]  Referring provider: Faheem Proctor MD  Dx:   Encounter Diagnosis     ICD-10-CM    1  Aftercare following right knee joint replacement surgery Z47 1     Z96 651    2  Difficulty walking R26 2    3  Acute pain of right knee M25 561      Subjective:  Sujata Griffith states his right knee is feeling better today compared to last week  Objective: See treatment log below    Assessment: Tolerated treatment well  Patient exhibited good technique with therapeutic exercises and would benefit from continued PT    Plan: Continue per plan of care  Progress treatment as tolerated         Precautions:  R TKR SX - Replacement Repair / Replace    Daily Treatment Log  Manual  10/1       MT, ROM 15'       HEP        Exercise Log 10/1       Balance Board 30x       Chair Squats 30x       P-Bar-GT-Forward, Backward,Side-Even & Dips 30x       BOSU-Walk 5'       Foam Pad SLR,Hip/KneeFl,Step Ups 30x       Foam Beam 12x       Fitter-Slalom        Monster Steps        BAPS- L-2        T/G-Squats PF 30x       W/P-Hip-Abd,Add,Flex,Ext 7 5#-30x       WP-Squats        Trimax-TKE        Fzafgic-JH-Eg        NK Table Exer        NK Table ROM 2 5#-10'       TM        Stepper        Bike 10'       ME, PE 15'               Modalities 10/1       MH&ES        US

## 2019-10-02 ENCOUNTER — OFFICE VISIT (OUTPATIENT)
Dept: PHYSICAL THERAPY | Facility: CLINIC | Age: 69
End: 2019-10-02
Payer: MEDICARE

## 2019-10-02 DIAGNOSIS — Z96.651 AFTERCARE FOLLOWING RIGHT KNEE JOINT REPLACEMENT SURGERY: Primary | ICD-10-CM

## 2019-10-02 DIAGNOSIS — Z47.1 AFTERCARE FOLLOWING RIGHT KNEE JOINT REPLACEMENT SURGERY: Primary | ICD-10-CM

## 2019-10-02 DIAGNOSIS — R26.2 DIFFICULTY WALKING: ICD-10-CM

## 2019-10-02 DIAGNOSIS — M25.561 ACUTE PAIN OF RIGHT KNEE: ICD-10-CM

## 2019-10-02 PROCEDURE — 97116 GAIT TRAINING THERAPY: CPT | Performed by: PHYSICAL THERAPIST

## 2019-10-02 PROCEDURE — 97112 NEUROMUSCULAR REEDUCATION: CPT | Performed by: PHYSICAL THERAPIST

## 2019-10-02 PROCEDURE — 97140 MANUAL THERAPY 1/> REGIONS: CPT | Performed by: PHYSICAL THERAPIST

## 2019-10-04 ENCOUNTER — OFFICE VISIT (OUTPATIENT)
Dept: PHYSICAL THERAPY | Facility: CLINIC | Age: 69
End: 2019-10-04
Payer: MEDICARE

## 2019-10-04 DIAGNOSIS — Z96.651 AFTERCARE FOLLOWING RIGHT KNEE JOINT REPLACEMENT SURGERY: Primary | ICD-10-CM

## 2019-10-04 DIAGNOSIS — R26.2 DIFFICULTY WALKING: ICD-10-CM

## 2019-10-04 DIAGNOSIS — M25.561 ACUTE PAIN OF RIGHT KNEE: ICD-10-CM

## 2019-10-04 DIAGNOSIS — Z47.1 AFTERCARE FOLLOWING RIGHT KNEE JOINT REPLACEMENT SURGERY: Primary | ICD-10-CM

## 2019-10-04 PROCEDURE — 97112 NEUROMUSCULAR REEDUCATION: CPT | Performed by: PHYSICAL THERAPIST

## 2019-10-04 PROCEDURE — 97116 GAIT TRAINING THERAPY: CPT | Performed by: PHYSICAL THERAPIST

## 2019-10-04 PROCEDURE — 97140 MANUAL THERAPY 1/> REGIONS: CPT | Performed by: PHYSICAL THERAPIST

## 2019-10-04 NOTE — PROGRESS NOTES
Today's date: 10/4/2019  Patient name: Laura Gao  : 1950  MRN: [id number]  Referring provider: Corie Hill MD  Dx:   Encounter Diagnosis     ICD-10-CM    1  Aftercare following right knee joint replacement surgery Z47 1     Z96 651    2  Difficulty walking R26 2    3  Acute pain of right knee M25 561      Subjective:  Keke Pollock states his right knee is feeling a little better  He can stand longer and walk further now plus his movement / range of motion is improving  Objective: See treatment log below    Assessment: Tolerated treatment well  Patient exhibited good technique with therapeutic exercises and would benefit from continued PT    Plan: Continue per plan of care  Progress treatment as tolerated         Precautions:  R TKR SX - Replacement Repair / Replace    Daily Treatment Log  Manual  10/1 10/2 10/4     MT, ROM 15' 15' 15'     HEP        Exercise Log 10/1 10/2 10/4     Balance Board 30x 30x 30x     Chair Squats 30x 30x 30x     P-Bar-GT-Forward, Backward,Side-Even & Dips 30x 30x 30x     BOSU-Walk 5' 5' 5'     Foam Pad SLR,Hip/KneeFl,Step Ups 30x 30x 30x     Foam Beam 12x 12x 12x     Fitter-Slalom        Monster Steps        BAPS- L-2        T/G-Squats PF 30x 30x 30x     W/P-Hip-Abd,Add,Flex,Ext 7 5#-30x 7 5#-30x 17 5#-30x     WP-Squats        Trimax-TKE        Qwcwvzf-PB-Uj        NK Table Exer        NK Table ROM 2 5#-10' 2 5#-10' 5 0#-10'     TM        Stepper        Bike 10' 10' 10'     ME, PE 15' 15' 15'             Modalities 10/1 10/2 10/4     MH&ES        US

## 2019-10-07 ENCOUNTER — OFFICE VISIT (OUTPATIENT)
Dept: PHYSICAL THERAPY | Facility: CLINIC | Age: 69
End: 2019-10-07
Payer: MEDICARE

## 2019-10-07 DIAGNOSIS — Z47.1 AFTERCARE FOLLOWING RIGHT KNEE JOINT REPLACEMENT SURGERY: Primary | ICD-10-CM

## 2019-10-07 DIAGNOSIS — Z96.651 AFTERCARE FOLLOWING RIGHT KNEE JOINT REPLACEMENT SURGERY: Primary | ICD-10-CM

## 2019-10-07 DIAGNOSIS — M25.561 ACUTE PAIN OF RIGHT KNEE: ICD-10-CM

## 2019-10-07 DIAGNOSIS — R26.2 DIFFICULTY WALKING: ICD-10-CM

## 2019-10-07 PROCEDURE — 97116 GAIT TRAINING THERAPY: CPT | Performed by: PHYSICAL THERAPIST

## 2019-10-07 PROCEDURE — 97112 NEUROMUSCULAR REEDUCATION: CPT | Performed by: PHYSICAL THERAPIST

## 2019-10-07 PROCEDURE — 97140 MANUAL THERAPY 1/> REGIONS: CPT | Performed by: PHYSICAL THERAPIST

## 2019-10-07 NOTE — PROGRESS NOTES
Today's date: 10/7/2019  Patient name: Michelle Gomes  : 1950  MRN: [id number]  Referring provider: Arpit Irizarry MD  Dx:   Encounter Diagnosis     ICD-10-CM    1  Aftercare following right knee joint replacement surgery Z47 1     Z96 651    2  Difficulty walking R26 2    3  Acute pain of right knee M25 561      Subjective:  Dayami Richardson states his right knee is moving along and feeling better  Objective: See treatment log below    Assessment: Tolerated treatment well  Patient exhibited good technique with therapeutic exercises and would benefit from continued PT    Plan: Continue per plan of care  Progress treatment as tolerated         Precautions:  R TKR SX - Replacement Repair / Replace    Daily Treatment Log  Manual  10/1 10/2 10/4 10/7    MT, ROM 15' 15' 15'     HEP        Exercise Log 10/1 10/2 10/4 10/7    Balance Board 30x 30x 30x 30x    Chair Squats 30x 30x 30x 30x    P-Bar-GT-Forward, Backward,Side-Even & Dips 30x 30x 30x 30x    BOSU-Walk 5' 5' 5' 5'    Foam Pad SLR,Hip/KneeFl,Step Ups 30x 30x 30x 30x    Foam Beam 12x 12x 12x 12x    Fitter-Slalom        Monster Steps        BAPS- L-2        T/G-Squats PF 30x 30x 30x 30x    W/P-Hip-Abd,Add,Flex,Ext 7 5#-30x 7 5#-30x 17 5#-30x 17 5#-30x    WP-Squats        Trimax-TKE        Eoeeduv-HP-Rw    2x2'    NK Table Exer        NK Table ROM 2 5#-10' 2 5#-10' 5 0#-10' 5#-10'    TM        Stepper        Bike 10' 10' 10' 10'    ME, PE 13' 15' 15' 15'            Modalities 10/1 10/2 10/4 10/7    MH&ES        US

## 2019-10-09 ENCOUNTER — OFFICE VISIT (OUTPATIENT)
Dept: PHYSICAL THERAPY | Facility: CLINIC | Age: 69
End: 2019-10-09
Payer: MEDICARE

## 2019-10-09 DIAGNOSIS — M25.561 ACUTE PAIN OF RIGHT KNEE: ICD-10-CM

## 2019-10-09 DIAGNOSIS — Z47.1 AFTERCARE FOLLOWING RIGHT KNEE JOINT REPLACEMENT SURGERY: Primary | ICD-10-CM

## 2019-10-09 DIAGNOSIS — Z96.651 AFTERCARE FOLLOWING RIGHT KNEE JOINT REPLACEMENT SURGERY: Primary | ICD-10-CM

## 2019-10-09 DIAGNOSIS — R26.2 DIFFICULTY WALKING: ICD-10-CM

## 2019-10-09 PROCEDURE — 97140 MANUAL THERAPY 1/> REGIONS: CPT | Performed by: PHYSICAL THERAPIST

## 2019-10-09 PROCEDURE — 97112 NEUROMUSCULAR REEDUCATION: CPT | Performed by: PHYSICAL THERAPIST

## 2019-10-09 PROCEDURE — 97116 GAIT TRAINING THERAPY: CPT | Performed by: PHYSICAL THERAPIST

## 2019-10-09 NOTE — PROGRESS NOTES
Today's date: 10/9/2019  Patient name: Marti Dickey  : 1950  MRN: [id number]  Referring provider: Dagoberto Plummer MD  Dx:   Encounter Diagnosis     ICD-10-CM    1  Aftercare following right knee joint replacement surgery Z47 1     Z96 651    2  Difficulty walking R26 2    3  Acute pain of right knee M25 561      Subjective:  Lloyd Veliz states his knee is feeling better  He just started to live alone this week  Objective: See treatment log below    Assessment: Tolerated treatment well  Patient exhibited good technique with therapeutic exercises and would benefit from continued PT    Plan: Continue per plan of care  Progress treatment as tolerated         Precautions:  R TKR SX - Replacement Repair / Replace    Daily Treatment Log  Manual  10/1 10/2 10/4 10/7 10/9   MT, ROM 15' 15' 15'  15'   HEP        Exercise Log 10/1 10/2 10/4 10/7 10/9   Balance Board 30x 30x 30x 30x 30x   Chair Squats 30x 30x 30x 30x 30x   P-Bar-GT-Forward, Backward,Side-Even & Dips 30x 30x 30x 30x 30x   BOSU-Walk 5' 5' 5' 5' 5'   Foam Pad SLR,Hip/KneeFl,Step Ups 30x 30x 30x 30x 30x   Foam Beam 12x 12x 12x 12x 12x   Fitter-Slalom        Monster Steps        BAPS- L-2        T/G-Squats PF 30x 30x 30x 30x 30x   W/P-Hip-Abd,Add,Flex,Ext 7 5#-30x 7 5#-30x 17 5#-30x 17 5#-30x 17 5#-30x   WP-Squats        Trimax-TKE        Atjnnoj-GB-Gh    2x2' 2x2'   NK Table Exer        NK Table ROM 2 5#-10' 2 5#-10' 5 0#-10' 5#-10' 5#-30x   TM        Stepper        Bike 10' 10' 10' 10' 10'   ME, PE 13' 15' 15' 15' 15'           Modalities 10/1 10/2 10/4 10/7 10/9   &ES        US

## 2019-10-11 ENCOUNTER — OFFICE VISIT (OUTPATIENT)
Dept: PHYSICAL THERAPY | Facility: CLINIC | Age: 69
End: 2019-10-11
Payer: MEDICARE

## 2019-10-11 DIAGNOSIS — M25.561 ACUTE PAIN OF RIGHT KNEE: ICD-10-CM

## 2019-10-11 DIAGNOSIS — Z47.1 AFTERCARE FOLLOWING RIGHT KNEE JOINT REPLACEMENT SURGERY: Primary | ICD-10-CM

## 2019-10-11 DIAGNOSIS — R26.2 DIFFICULTY WALKING: ICD-10-CM

## 2019-10-11 DIAGNOSIS — Z96.651 AFTERCARE FOLLOWING RIGHT KNEE JOINT REPLACEMENT SURGERY: Primary | ICD-10-CM

## 2019-10-11 PROCEDURE — 97110 THERAPEUTIC EXERCISES: CPT

## 2019-10-11 PROCEDURE — 97140 MANUAL THERAPY 1/> REGIONS: CPT

## 2019-10-11 NOTE — PROGRESS NOTES
Today's date: 10/11/2019  Patient name: Meño Moreau  : 1950  MRN: [id number]  Referring provider: Farhan Leong MD  Dx:   Encounter Diagnosis     ICD-10-CM    1  Aftercare following right knee joint replacement surgery Z47 1     Z96 651    2  Difficulty walking R26 2    3  Acute pain of right knee M25 561      Subjective:  No new c/o pain today  Objective: See treatment log below    Assessment: Tolerated treatment well  Patient exhibited good technique with therapeutic exercises and would benefit from continued PT to increase R knee ROM/strength and endurance to improve mobility and gait  Plan: Continue per plan of care  Progress treatment as tolerated         Precautions:  R TKR SX - Replacement Repair / Replace    Daily Treatment Log  Manual  10/10    10/9   MT, ROM 15'    15'   HEP        Exercise Log 10/10    10/9   Balance Board 30x ea    30x   Chair Squats     30x   P-Bar-GT-Forward, Backward,Side-Even & Dips     30x   BOSU-Walk 5'    5'   Foam Pad SLR,Hip/KneeFl,Step Ups  (doubled) 30x ea    30x   Foam Beam 6x ea    12x   T/G-Squats PF 30x ea    30x   W/P-Hip-Abd,Add,Flex,Ext 17 5# 30x ea    17 5#-30x   WP-Squats        Trimax-TKE        Mfqkwqk-ZW-Zo 2x2'    2x2'   NK Table Exer        NK Table ROM 2 5#-10'    5#-30x   TM        Stepper        Bike 10'    10'   ME, PE 15'    15'           Modalities 10/10    10/9   MH&ES NT       US NT

## 2019-10-15 ENCOUNTER — OFFICE VISIT (OUTPATIENT)
Dept: PHYSICAL THERAPY | Facility: CLINIC | Age: 69
End: 2019-10-15
Payer: MEDICARE

## 2019-10-15 DIAGNOSIS — Z47.1 AFTERCARE FOLLOWING RIGHT KNEE JOINT REPLACEMENT SURGERY: Primary | ICD-10-CM

## 2019-10-15 DIAGNOSIS — Z96.651 AFTERCARE FOLLOWING RIGHT KNEE JOINT REPLACEMENT SURGERY: Primary | ICD-10-CM

## 2019-10-15 DIAGNOSIS — R26.2 DIFFICULTY WALKING: ICD-10-CM

## 2019-10-15 DIAGNOSIS — M25.561 ACUTE PAIN OF RIGHT KNEE: ICD-10-CM

## 2019-10-15 PROCEDURE — 97110 THERAPEUTIC EXERCISES: CPT

## 2019-10-15 PROCEDURE — 97140 MANUAL THERAPY 1/> REGIONS: CPT

## 2019-10-15 NOTE — PROGRESS NOTES
Today's date: 10/15/2019  Patient name: Juancho Redmond  : 1950  MRN: [id number]  Referring provider: Simona Muñoz MD  Dx:   Encounter Diagnosis     ICD-10-CM    1  Aftercare following right knee joint replacement surgery Z47 1     Z96 651    2  Difficulty walking R26 2    3  Acute pain of right knee M25 561      Subjective:  "I am really pleased with my progress! This knee sx is totally different than the first one "    Objective: See treatment log below    Assessment: Tolerated treatment well  Patient exhibited good technique with therapeutic exercises and would benefit from continued PT to increase R knee ROM/strength and endurance to improve mobility and gait  Plan: Continue per plan of care  Progress treatment as tolerated         Precautions:  R TKR SX - Replacement Repair / Replace    Daily Treatment Log  Manual  10/10 10/15      MT, ROM 15' 15'      HEP        Exercise Log 10/10 10/15      Balance Board 30x ea 30x ea      Chair Squats        P-Bar-GT-Forward, Backward,Side-Even & Dips        BOSU-Walk 5' 5'      Foam Pad SLR,Hip/KneeFl,Step Ups  (doubled) 30x ea 30x ea      Foam Beam 6x ea 6x ea      T/G-Squats PF 30x ea 30x ea      W/P-Hip-Abd,Add,Flex,Ext 17 5# 30x ea 17 5#   30x ea      WP-Squats        Trimax-TKE        Vpnxfij-CY-Wm 2x2' 2x2'      NK Table Exer  5# 30x ea      NK Table ROM 2 5#-10' 2x10'      TM        Stepper        Bike 10' 10'      ME, PE 15' 15'              Modalities 10/10 10/15      MH&ES NT NT      US NT NT

## 2019-10-17 ENCOUNTER — OFFICE VISIT (OUTPATIENT)
Dept: PHYSICAL THERAPY | Facility: CLINIC | Age: 69
End: 2019-10-17
Payer: MEDICARE

## 2019-10-17 DIAGNOSIS — Z47.1 AFTERCARE FOLLOWING RIGHT KNEE JOINT REPLACEMENT SURGERY: Primary | ICD-10-CM

## 2019-10-17 DIAGNOSIS — R26.2 DIFFICULTY WALKING: ICD-10-CM

## 2019-10-17 DIAGNOSIS — M25.561 ACUTE PAIN OF RIGHT KNEE: ICD-10-CM

## 2019-10-17 DIAGNOSIS — Z96.651 AFTERCARE FOLLOWING RIGHT KNEE JOINT REPLACEMENT SURGERY: Primary | ICD-10-CM

## 2019-10-17 PROCEDURE — 97140 MANUAL THERAPY 1/> REGIONS: CPT

## 2019-10-17 PROCEDURE — 97110 THERAPEUTIC EXERCISES: CPT

## 2019-10-17 NOTE — PROGRESS NOTES
Today's date: 10/17/2019  Patient name: Sonu Pacheco  : 1950  MRN: [id number]  Referring provider: Faheem Proctor MD  Dx:   Encounter Diagnosis     ICD-10-CM    1  Aftercare following right knee joint replacement surgery Z47 1     Z96 651    2  Difficulty walking R26 2    3  Acute pain of right knee M25 561      Subjective:  No new c/o pain today  Objective: See treatment log below    Assessment: Tolerated treatment well  Patient exhibited good technique with therapeutic exercises and would benefit from continued PT to increase R knee ROM/strength and endurance to improve mobility  Plan: Continue per plan of care  Progress treatment as tolerated         Precautions:  R TKR SX - Replacement Repair / Replace    Daily Treatment Log  Manual  10/10 10/15 10/17     MT, ROM 15' 15' 15'     HEP        Exercise Log 10/10 10/15 10/17     Balance Board 30x ea 30x ea 30x     Chair Squats        P-Bar-GT-Forward, Backward,Side-Even & Dips        BOSU-Walk 5' 5' 5'     Foam Pad SLR,Hip/KneeFl,Step Ups  (doubled) 30x ea 30x ea 30x     Foam Beam 6x ea 6x ea 10x     T/G-Squats PF 30x ea 30x ea 30x     W/P-Hip-Abd,Add,Flex,Ext 17 5# 30x ea 17 5#   30x ea 17 5#-30x     WP-Squats        Trimax-TKE        Rzxxgsr-LY-Ea 2x2' 2x2' 2x2'     NK Table Exer  5# 30x ea 5# 30x ea     NK Table ROM 2 5#-10' 2x10' 2x10'     TM        Stepper        Bike 10' 10' 10'     ME, PE 13' 15' 15'             Modalities 10/10 10/15 10/17     MH&ES NT NT NT     US NT NT NT

## 2019-10-21 ENCOUNTER — APPOINTMENT (OUTPATIENT)
Dept: PHYSICAL THERAPY | Facility: CLINIC | Age: 69
End: 2019-10-21
Payer: MEDICARE

## 2019-10-22 ENCOUNTER — OFFICE VISIT (OUTPATIENT)
Dept: PHYSICAL THERAPY | Facility: CLINIC | Age: 69
End: 2019-10-22
Payer: MEDICARE

## 2019-10-22 DIAGNOSIS — Z96.651 AFTERCARE FOLLOWING RIGHT KNEE JOINT REPLACEMENT SURGERY: Primary | ICD-10-CM

## 2019-10-22 DIAGNOSIS — M25.561 ACUTE PAIN OF RIGHT KNEE: ICD-10-CM

## 2019-10-22 DIAGNOSIS — Z96.651 PRESENCE OF RIGHT ARTIFICIAL KNEE JOINT: ICD-10-CM

## 2019-10-22 DIAGNOSIS — Z47.1 AFTERCARE FOLLOWING JOINT REPLACEMENT SURGERY: ICD-10-CM

## 2019-10-22 DIAGNOSIS — M25.561 PAIN IN RIGHT KNEE: ICD-10-CM

## 2019-10-22 DIAGNOSIS — R26.2 DIFFICULTY WALKING: ICD-10-CM

## 2019-10-22 DIAGNOSIS — Z47.1 AFTERCARE FOLLOWING RIGHT KNEE JOINT REPLACEMENT SURGERY: Primary | ICD-10-CM

## 2019-10-22 DIAGNOSIS — R26.2 DIFFICULTY IN WALKING, NOT ELSEWHERE CLASSIFIED: ICD-10-CM

## 2019-10-22 PROCEDURE — 97140 MANUAL THERAPY 1/> REGIONS: CPT

## 2019-10-22 PROCEDURE — 97164 PT RE-EVAL EST PLAN CARE: CPT

## 2019-10-22 PROCEDURE — 97110 THERAPEUTIC EXERCISES: CPT

## 2019-10-22 NOTE — LETTER
2019  Signature Required / Plan Of Care / PT Reevaluation    Patient: Kj Scott  YOB: 1950   Date of Visit: 10/22/2019     Encounter Diagnosis     ICD-10-CM    1  Aftercare following right knee joint replacement surgery Z47 1     Z96 651    2  Difficulty walking R26 2    3  Acute pain of right knee M25 561    4  Aftercare following joint replacement surgery Z47 1    5  Difficulty in walking, not elsewhere classified R26 2    6  Pain in right knee M25 561    7  Presence of right artificial knee joint Z96 651      Dear Dr Joe Castano:  Thank you for your recent referral of Kj Scott    Please review the attached evaluation summary from Bjorn's recent visit  Please verify that you agree with the plan of care by signing the attached order  If you have any questions or concerns, please do not hesitate to call  I sincerely appreciate the opportunity to share in the care of one of your patients and hope to have another opportunity to work with you in the near future  Sincerely,    Jonas Rouse PT    Referring Provider:   I certify that I have read the below Plan of Care and certify the need for these services furnished under this plan of treatment while under my care  Alize Clancy MD  [address] [place] [id number]  VIA Facsimile: [phone]    Please SIGN ABOVE and return THIS PAGE ONLY to Fax # [phone]        Today's date: 10/22/2019  Patient name: Kj Scott  : 1950  MRN: [id number]  Referring provider: Sarah Chakraborty MD  Dx:   Encounter Diagnosis     ICD-10-CM    1  Aftercare following right knee joint replacement surgery Z47 1     Z96 651    2  Difficulty walking R26 2    3  Acute pain of right knee M25 561      Subjective:  No new c/o pain today    "I've noticed a great improvement in my incision since the scar release at my last therapy appointment "    Objective: See treatment log below    Assessment: Tolerated treatment well  Patient exhibited good technique with therapeutic exercises and would benefit from continued PT to increase R knee ROM/strength and endurance to improve mobility and gait  Plan: Continue per plan of care  Progress treatment as tolerated  Precautions:  R TKR SX - Replacement Repair / Replace    Daily Treatment Log  Manual  10/10 10/15 10/17 10/22    MT, ROM 15' 15' 15' 20'    HEP        Exercise Log 10/10 10/15 10/17 10/22    Balance Board 30x ea 30x ea 30x 30x    Chair Squats        P-Bar-GT-Forward, Backward,Side-Even & Dips    12x    BOSU-Walk 5' 5' 5' 5'    Foam Pad SLR,Hip/KneeFl,Step Ups  (doubled) 30x ea 30x ea 30x 30x    Foam Beam 6x ea 6x ea 10x 10x    T/G-Squats PF 30x ea 30x ea 30x 30x    W/P-Hip-Abd,Add,Flex,Ext 17 5# 30x ea 17 5#   30x ea 17 5#-30x 20# 30x ea    WP-Squats    20# 30x    Trimax-TKE        Ljxfznn-QL-Wi 2x2' 2x2' 2x2' 4x2'    NK Table Exer  5# 30x ea 5# 30x ea 7 5#-30x    NK Table ROM 2 5#-10' 2x10' 2x10' 2x10'    TM        Stepper        Bike 10' 10' 10' 10'    ME, PE 15' 15' 15' 15'            Modalities 10/10 10/15 10/17 10/22    MH&ES NT NT NT NT    US NT NT NT NT        PT Re-Evaluation   Today's date: 10/22/2019    Patient name: Luis Carlos Stoll  : 1950  MRN: [id number]  Referring provider: Abhilash Pickard MD  Dx:   Encounter Diagnosis     ICD-10-CM    1  Aftercare following right knee joint replacement surgery Z47 1     Z96 651    2  Difficulty walking R26 2    3  Acute pain of right knee M25 561      Assessment  Assessment details: Patient is progressing well with his R TKR SX rehab  Impairments: abnormal or restricted ROM, abnormal movement, activity intolerance, impaired balance, impaired physical strength, pain with function and safety issue  Understanding of Dx/Px/POC: excellent  Goals  STG 2-4 weeks:    Increase B LE strength 2-5 lbs  Decrease pain by 1-2 levels on 1-10 pain scale     Increase standing/walking tolerance to >30 minutes  Patient independent with HEP  LTG 6-8 weeks:   Increase B LE strength 10-20 lbs  Decrease pain to 0-2/10 with activity  Increase single leg stance >30 seconds  Increase standing/walking tolerance to >90 minutes  Increase range of motion to normal   Improve gait pattern, coordination and balance to normal   D/C with HEP  Plan  Patient would benefit from: PT eval and skilled physical therapy  Planned modality interventions: unattended electrical stimulation  Planned therapy interventions: joint mobilization, manual therapy, balance, balance/weight bearing training, muscle pump exercises, patient education, postural training, self care, strengthening, stretching, therapeutic activities, therapeutic exercise, therapeutic training, transfer training, home exercise program, graded exercise, gait training, flexibility and coordination  Frequency: 3x week  Duration in weeks: 6  Treatment plan discussed with: patient      Subjective Evaluation    Pain  Quality: discomfort, knife-like, pulling, squeezing, radiating and sharp  Relieving factors: change in position, relaxation, rest, ice and support  Aggravating factors: running, lifting, stair climbing, walking and standing  Progression: improved    Treatments  Previous treatment: physical therapy  Current treatment: physical therapy  Patient Goals  Patient goals for therapy: decreased pain, improved balance, increased motion, return to work, return to [employer] activities, independence with ADLs/IADLs and increased strength        Objective     Date of onset:  9/10/2019    Date of Surgery:  9/10/2019    History of Present Episode: 9/19/2019  Sharron  states his original Right Total Knee Replacement became infected  He just had his old R TKR SX removed and installed a new R TKR SX  Past Medical History:    9/19/2019  Sharron Glass reports R TKR SX   Neck Sx  Pain still in buttocks    L TKR SX     Previous Level of Functional Ability:  9/19/2019  Chong Chandler states his right knee became worse and worse and he finally went for a R TKR SX replacement  Inspection / Palpation:  Knee:  9/19/2019  Mesomorphic / Endomorphic body type  No signs of infection  No signs of wounds  Mild signs of drainage  Moderate signs of ecchymotic regions  Moderate signs of erythremic regions  Moderate signs of muscle spasm  Moderate signs of muscle guarding  Moderate signs of tenderness reported to palpation  Moderate signs of swelling  Positive signs of a surgery site  Current conditions appears consistent with recent acute R TKR SX episode  Chief Complaints:  9/19/2019  Bjorn reports moderate to severe difficulty with standing  Chong Chandler reports moderate to severe difficulty with walking  Chong Chandler reports moderate to severe difficulty with movement / use of his right knee  Chong Chandler reports moderate to severe difficulty with use of stairs  Chong Chandler reports severe difficulty with running  Chong Chandler reports severe difficulty with jumping  Chong Chandler reports moderate to severe difficulty with use of inclines  Chong Chandler reports moderate difficulty with sleeping  Chong Chandler reports moderate to severe difficulty with his strength and endurance  Chong Chandler reports moderate to severe limitations with his range of motion  Chong Chandler reports moderate to severe difficulty lying on his right knee region      KNEE PAIN Resting Moving Palpation Standing Walking   9/19/2019 Lt 0 0 0 0 0   9/19/2019 Rt 3-5 5-8 5-8 4-8 4-8   10/22/2019 Rt 1-3 2-5 2-5 2-5 2-5     KNEE PAIN Running Stairs Sleeping Twisting Jumping   9/19/2019 Lt 0 0 0 0 0   9/19/2019 Rt NA 8-10 4-8 5-9 NA   10/22/2019 Rt NA 3-6 2-5 2-5 NA     KNEE/ AROM Flexion Extension SLR   9/19/2019 Lt 135° 0° 85°   9/19/2019 Rt 54° 12° 74°   10/22/2019 Rt 105° 4° 81°     KNEE MMT Flexion Extension Varus Stress Valgus Stress   9/19/2019 Lt 0/10  45 lbs 0/10  38 lbs 0/10  37 lbs 0/10  36 lbs   9/19/2019 Rt 7/10 6 lbs 7/10  3 lbs 7/10  5 lbs 7/10  4 lbs   10/22/2019 Rt 3/10  17 lbs 3/10  15 lbs 3/10  17 lbs 3/10  16 lbs     Precautions:  R TKR SX - Replacement Repair / Replace

## 2019-10-22 NOTE — PROGRESS NOTES
Today's date: 10/22/2019  Patient name: Hitesh Jones  : 1950  MRN: [id number]  Referring provider: Calderon Sanchez MD  Dx:   Encounter Diagnosis     ICD-10-CM    1  Aftercare following right knee joint replacement surgery Z47 1     Z96 651    2  Difficulty walking R26 2    3  Acute pain of right knee M25 561      Subjective:  No new c/o pain today  "I've noticed a great improvement in my incision since the scar release at my last therapy appointment "    Objective: See treatment log below    Assessment: Tolerated treatment well  Patient exhibited good technique with therapeutic exercises and would benefit from continued PT to increase R knee ROM/strength and endurance to improve mobility and gait  Plan: Continue per plan of care  Progress treatment as tolerated         Precautions:  R TKR SX - Replacement Repair / Replace    Daily Treatment Log  Manual  10/10 10/15 10/17 10/22    MT, ROM 15' 15' 15' 20'    HEP        Exercise Log 10/10 10/15 10/17 10/22    Balance Board 30x ea 30x ea 30x 30x    Chair Squats        P-Bar-GT-Forward, Backward,Side-Even & Dips    12x    BOSU-Walk 5' 5' 5' 5'    Foam Pad SLR,Hip/KneeFl,Step Ups  (doubled) 30x ea 30x ea 30x 30x    Foam Beam 6x ea 6x ea 10x 10x    T/G-Squats PF 30x ea 30x ea 30x 30x    W/P-Hip-Abd,Add,Flex,Ext 17 5# 30x ea 17 5#   30x ea 17 5#-30x 20# 30x ea    WP-Squats    20# 30x    Trimax-TKE        Awrsabs-TK-Or 2x2' 2x2' 2x2' 4x2'    NK Table Exer  5# 30x ea 5# 30x ea 7 5#-30x    NK Table ROM 2 5#-10' 2x10' 2x10' 2x10'    TM        Stepper        Bike 10' 10' 10' 10'    ME, PE 13' 15' 15' 15'            Modalities 10/10 10/15 10/17 10/22    MH&ES NT NT NT NT    US NT NT NT NT

## 2019-10-23 NOTE — PROGRESS NOTES
PT Re-Evaluation   Today's date: 10/22/2019    Patient name: Cheo Holland  : 1950  MRN: [id number]  Referring provider: Remy Solorzano MD  Dx:   Encounter Diagnosis     ICD-10-CM    1  Aftercare following right knee joint replacement surgery Z47 1     Z96 651    2  Difficulty walking R26 2    3  Acute pain of right knee M25 561      Assessment  Assessment details: Patient is progressing well with his R TKR SX rehab  Impairments: abnormal or restricted ROM, abnormal movement, activity intolerance, impaired balance, impaired physical strength, pain with function and safety issue  Understanding of Dx/Px/POC: excellent  Goals  STG 2-4 weeks:    Increase B LE strength 2-5 lbs  Decrease pain by 1-2 levels on 1-10 pain scale  Increase standing/walking tolerance to >30 minutes  Patient independent with HEP  LTG 6-8 weeks:   Increase B LE strength 10-20 lbs  Decrease pain to 0-2/10 with activity  Increase single leg stance >30 seconds  Increase standing/walking tolerance to >90 minutes  Increase range of motion to normal   Improve gait pattern, coordination and balance to normal   D/C with HEP        Plan  Patient would benefit from: PT eval and skilled physical therapy  Planned modality interventions: unattended electrical stimulation  Planned therapy interventions: joint mobilization, manual therapy, balance, balance/weight bearing training, muscle pump exercises, patient education, postural training, self care, strengthening, stretching, therapeutic activities, therapeutic exercise, therapeutic training, transfer training, home exercise program, graded exercise, gait training, flexibility and coordination  Frequency: 3x week  Duration in weeks: 6  Treatment plan discussed with: patient      Subjective Evaluation    Pain  Quality: discomfort, knife-like, pulling, squeezing, radiating and sharp  Relieving factors: change in position, relaxation, rest, ice and support  Aggravating factors: running, lifting, stair climbing, walking and standing  Progression: improved    Treatments  Previous treatment: physical therapy  Current treatment: physical therapy  Patient Goals  Patient goals for therapy: decreased pain, improved balance, increased motion, return to work, return to [employer] activities, independence with ADLs/IADLs and increased strength        Objective     Date of onset:  9/10/2019    Date of Surgery:  9/10/2019    History of Present Episode: 9/19/2019  Harvinder Lombardi states his original Right Total Knee Replacement became infected  He just had his old R TKR SX removed and installed a new R TKR SX  Past Medical History:    9/19/2019  Harvinder Lombardi reports R TKR SX   Neck Sx  Pain still in buttocks  L TKR SX     Previous Level of Functional Ability:  9/19/2019  Harvinder Lombardi states his right knee became worse and worse and he finally went for a R TKR SX replacement  Inspection / Palpation:  Knee:  9/19/2019  Mesomorphic / Endomorphic body type  No signs of infection  No signs of wounds  Mild signs of drainage  Moderate signs of ecchymotic regions  Moderate signs of erythremic regions  Moderate signs of muscle spasm  Moderate signs of muscle guarding  Moderate signs of tenderness reported to palpation  Moderate signs of swelling  Positive signs of a surgery site  Current conditions appears consistent with recent acute R TKR SX episode  Chief Complaints:  9/19/2019  Bjorn reports moderate to severe difficulty with standing  Harvinder Lombardi reports moderate to severe difficulty with walking  Harvinder Lombardi reports moderate to severe difficulty with movement / use of his right knee  Harvinder Lombardi reports moderate to severe difficulty with use of stairs  Harvinder Lombardi reports severe difficulty with running  Harvinder Lombardi reports severe difficulty with jumping  Harvinder Lombardi reports moderate to severe difficulty with use of inclines  Harvinder Lombardi reports moderate difficulty with sleeping    Harvinder Lombardi reports moderate to severe difficulty with his strength and endurance  Lloyd Veliz reports moderate to severe limitations with his range of motion  Lloyd Veliz reports moderate to severe difficulty lying on his right knee region      KNEE PAIN Resting Moving Palpation Standing Walking   9/19/2019 Lt 0 0 0 0 0   9/19/2019 Rt 3-5 5-8 5-8 4-8 4-8   10/22/2019 Rt 1-3 2-5 2-5 2-5 2-5     KNEE PAIN Running Stairs Sleeping Twisting Jumping   9/19/2019 Lt 0 0 0 0 0   9/19/2019 Rt NA 8-10 4-8 5-9 NA   10/22/2019 Rt NA 3-6 2-5 2-5 NA     KNEE/ AROM Flexion Extension SLR   9/19/2019 Lt 135° 0° 85°   9/19/2019 Rt 54° 12° 74°   10/22/2019 Rt 105° 4° 81°     KNEE MMT Flexion Extension Varus Stress Valgus Stress   9/19/2019 Lt 0/10  45 lbs 0/10  38 lbs 0/10  37 lbs 0/10  36 lbs   9/19/2019 Rt 7/10  6 lbs 7/10  3 lbs 7/10  5 lbs 7/10  4 lbs   10/22/2019 Rt 3/10  17 lbs 3/10  15 lbs 3/10  17 lbs 3/10  16 lbs     Precautions:  R TKR SX - Replacement Repair / Replace

## 2019-10-24 ENCOUNTER — OFFICE VISIT (OUTPATIENT)
Dept: PHYSICAL THERAPY | Facility: CLINIC | Age: 69
End: 2019-10-24
Payer: MEDICARE

## 2019-10-24 DIAGNOSIS — M25.561 ACUTE PAIN OF RIGHT KNEE: ICD-10-CM

## 2019-10-24 DIAGNOSIS — R26.2 DIFFICULTY WALKING: ICD-10-CM

## 2019-10-24 DIAGNOSIS — Z96.651 AFTERCARE FOLLOWING RIGHT KNEE JOINT REPLACEMENT SURGERY: Primary | ICD-10-CM

## 2019-10-24 DIAGNOSIS — Z47.1 AFTERCARE FOLLOWING RIGHT KNEE JOINT REPLACEMENT SURGERY: Primary | ICD-10-CM

## 2019-10-24 PROCEDURE — 97140 MANUAL THERAPY 1/> REGIONS: CPT | Performed by: PHYSICAL THERAPIST

## 2019-10-24 PROCEDURE — 97112 NEUROMUSCULAR REEDUCATION: CPT | Performed by: PHYSICAL THERAPIST

## 2019-10-24 NOTE — PROGRESS NOTES
Today's date: 10/24/2019  Patient name: Merly Maxwell  : 1950  MRN: [id number]  Referring provider: Ac Carty MD  Dx:   Encounter Diagnosis     ICD-10-CM    1  Aftercare following right knee joint replacement surgery Z47 1     Z96 651    2  Difficulty walking R26 2    3  Acute pain of right knee M25 561      Subjective:  Manuela Palomino states his right knee is finally getting stronger and has more movement  Objective: See treatment log below    Assessment: Tolerated treatment well  Patient exhibited good technique with therapeutic exercises and would benefit from continued PT    Plan: Continue per plan of care  Progress treatment as tolerated         Precautions:  R TKR SX - Replacement Repair / Replace    Daily Treatment Log  Manual  10/10 10/15 10/17 10/22 10/24   MT, ROM 15' 15' 15' 20' 15'   HEP        Exercise Log 10/10 10/15 10/17 10/22 10/24   Balance Board 30x ea 30x ea 30x 30x 30x   Chair Squats        P-Bar-GT-Forward, Backward,Side-Even & Dips    12x 12x   BOSU-Walk 5' 5' 5' 5' 5'   Foam Pad SLR,Hip/KneeFl,Step Ups  (doubled) 30x ea 30x ea 30x 30x 30x   Foam Beam 6x ea 6x ea 10x 10x 15x   T/G-Squats PF 30x ea 30x ea 30x 30x 30x   W/P-Hip-Abd,Add,Flex,Ext 17 5# 30x ea 17 5#   30x ea 17 5#-30x 20# 30x ea 20#-30x   WP-Squats    20# 30x 20#-30x   Trimax-TKE        Ggqpcti-IN-Et 2x2' 2x2' 2x2' 4x2' 4x2'   NK Table Exer  5# 30x ea 5# 30x ea 7 5#-30x 7 5#-30x   NK Table ROM 2 5#-10' 2x10' 2x10' 2x10' 2x10'   TM        Stepper        Bike 10' 10' 10' 10' 10'   ME, PE 13' 15' 15' 15' 15'           Modalities 10/10 10/15 10/17 10/22 10/24   MH&ES NT NT NT NT    US NT NT NT NT

## 2019-10-25 ENCOUNTER — APPOINTMENT (OUTPATIENT)
Dept: PHYSICAL THERAPY | Facility: CLINIC | Age: 69
End: 2019-10-25
Payer: MEDICARE

## 2019-10-25 NOTE — PROGRESS NOTES
Today's date: 10/25/2019  Patient name: Lisandra Correa  : 1950  MRN: [id number]  Referring provider: Geoffrey King MD  Dx:   Encounter Diagnosis     ICD-10-CM    1  Aftercare following right knee joint replacement surgery Z47 1     Z96 651    2  Difficulty walking R26 2    3  Acute pain of right knee M25 561      Subjective:  ***    Objective: See treatment log below    Assessment: Tolerated treatment {Tolerated treatment :3033222932}   Patient {assessment:5984740465}    Plan: {PLAN:2680197437}     Precautions:  R TKR SX - Replacement Repair / Replace    Daily Treatment Log  Manual  10/25    10/24   MT, ROM 15'    15'   HEP        Exercise Log 10/25    10/24   Balance Board 30x ea    30x   Chair Squats        P-Bar-GT-Forward, Backward,Side-Even & Dips     12x   BOSU-Walk 5'    5'   Foam Pad SLR,Hip/KneeFl,Step Ups  (doubled) 30x ea    30x   Foam Beam 6x ea    15x   T/G-Squats PF 30x ea    30x   W/P-Hip-Abd,Add,Flex,Ext 17 5# 30x ea    20#-30x   WP-Squats     20#-30x   Trimax-TKE        Ncpkgow-YE-Ia 2x2'    4x2'   NK Table Exer     7 5#-30x   NK Table ROM 2 5#-10'    2x10'   TM        Stepper        Bike 10'    10'   ME, PE 15'    15'           Modalities 10/25    10/24   MH&ES NT       US NT

## 2019-10-28 ENCOUNTER — OFFICE VISIT (OUTPATIENT)
Dept: PHYSICAL THERAPY | Facility: CLINIC | Age: 69
End: 2019-10-28
Payer: MEDICARE

## 2019-10-28 ENCOUNTER — TRANSCRIBE ORDERS (OUTPATIENT)
Dept: PHYSICAL THERAPY | Facility: CLINIC | Age: 69
End: 2019-10-28

## 2019-10-28 DIAGNOSIS — R26.2 DIFFICULTY WALKING: ICD-10-CM

## 2019-10-28 DIAGNOSIS — Z47.1 AFTERCARE FOLLOWING RIGHT KNEE JOINT REPLACEMENT SURGERY: Primary | ICD-10-CM

## 2019-10-28 DIAGNOSIS — M25.561 ACUTE PAIN OF RIGHT KNEE: ICD-10-CM

## 2019-10-28 DIAGNOSIS — Z96.651 AFTERCARE FOLLOWING RIGHT KNEE JOINT REPLACEMENT SURGERY: Primary | ICD-10-CM

## 2019-10-28 PROCEDURE — 97140 MANUAL THERAPY 1/> REGIONS: CPT

## 2019-10-28 PROCEDURE — 97110 THERAPEUTIC EXERCISES: CPT

## 2019-10-28 NOTE — PROGRESS NOTES
Today's date: 10/28/2019  Patient name: Cyril Carvajal  : 1950  MRN: [id number]  Referring provider: Maryan Maria MD  Dx:   Encounter Diagnosis     ICD-10-CM    1  Aftercare following right knee joint replacement surgery Z47 1     Z96 651    2  Difficulty walking R26 2    3  Acute pain of right knee M25 561      Subjective:  No new c/o pain today  Objective: See treatment log below    Assessment: Tolerated treatment well  Patient exhibited good technique with therapeutic exercises and would benefit from continued PT to increase R knee ROM/strength and endurance to improve mobility and gait  Plan: Continue per plan of care  Progress treatment as tolerated         Precautions:  R TKR SX - Replacement Repair / Replace    Daily Treatment Log  Manual  10/25 10/28      MT, ROM 15' 15'      HEP        Exercise Log 10/25 10/28      Balance Board 30x ea 30x ea      Chair Squats        P-Bar-GT-Forward, Backward,Side-Even & Dips        BOSU-Walk 5' 5'      Foam Pad SLR,Hip/KneeFl,Step Ups  (doubled) 30x ea 30x ea      Foam Beam 6x ea 6x ea      T/G-Squats PF 30x ea 30x ea      W/P-Hip-Abd,Add,Flex,Ext 17 5# 30x ea 25# 30x ea      WP-Squats  25# 30x      Trimax-TKE        Cnlvelo-HZ-Sg 2x2' 2x2'      NK Table Exer        NK Table ROM 2 5#-10' 2x10'      TM        Stepper        Bike 10' 10'      ME, PE 15' 15'              Modalities 10/25 10/28      MH&ES NT NT      US NT NT

## 2019-10-30 ENCOUNTER — OFFICE VISIT (OUTPATIENT)
Dept: PHYSICAL THERAPY | Facility: CLINIC | Age: 69
End: 2019-10-30
Payer: MEDICARE

## 2019-10-30 DIAGNOSIS — R26.2 DIFFICULTY WALKING: ICD-10-CM

## 2019-10-30 DIAGNOSIS — Z47.1 AFTERCARE FOLLOWING RIGHT KNEE JOINT REPLACEMENT SURGERY: Primary | ICD-10-CM

## 2019-10-30 DIAGNOSIS — Z96.651 AFTERCARE FOLLOWING RIGHT KNEE JOINT REPLACEMENT SURGERY: Primary | ICD-10-CM

## 2019-10-30 DIAGNOSIS — M25.561 ACUTE PAIN OF RIGHT KNEE: ICD-10-CM

## 2019-10-30 PROCEDURE — 97112 NEUROMUSCULAR REEDUCATION: CPT | Performed by: PHYSICAL THERAPIST

## 2019-10-30 PROCEDURE — 97110 THERAPEUTIC EXERCISES: CPT | Performed by: PHYSICAL THERAPIST

## 2019-10-30 PROCEDURE — 97140 MANUAL THERAPY 1/> REGIONS: CPT | Performed by: PHYSICAL THERAPIST

## 2019-10-30 NOTE — PROGRESS NOTES
Today's date: 10/30/2019  Patient name: Marti Dickey  : 1950  MRN: [id number]  Referring provider: Dagoberto Plummer MD  Dx:   Encounter Diagnosis     ICD-10-CM    1  Aftercare following right knee joint replacement surgery Z47 1     Z96 651    2  Difficulty walking R26 2    3  Acute pain of right knee M25 561      Subjective:  Lloyd Veliz states his knee is improving  He still can not use his left knee to go up stairs but he almost can use his left knee / leg  Objective: See treatment log below    Assessment: Tolerated treatment well  Patient exhibited good technique with therapeutic exercises and would benefit from continued PT    Plan: Continue per plan of care  Progress treatment as tolerated         Precautions:  R TKR SX - Replacement Repair / Replace    Daily Treatment Log  Manual  10/25 10/28 10/30     MT, ROM 15' 15' 15'     HEP        Exercise Log 10/25 10/28 10/30     Balance Board 30x ea 30x ea 30x     Chair Squats        P-Bar-GT-Forward, Backward,Side-Even & Dips        BOSU-Walk 5' 5' 5'     Foam Pad SLR,Hip/KneeFl,Step Ups  (doubled) 30x ea 30x ea 30x     Foam Beam 6x ea 6x ea 6x     T/G-Squats PF 30x ea 30x ea 30x     W/P-Hip-Abd,Add,Flex,Ext 17 5# 30x ea 25# 30x ea 25#-30x     WP-Squats  25# 30x 25#-30x     Trimax-TKE        Muyhmyu-DK-My 2x2' 2x2' 2x2'     NK Table Exer        NK Table ROM 2 5#-10' 2x10' 2x10'     TM        Stepper        Bike 10' 10' 10'     ME, PE 13' 15' 15'             Modalities 10/25 10/28 10/30     MH&ES NT NT NA     US NT NT NA

## 2019-11-01 ENCOUNTER — OFFICE VISIT (OUTPATIENT)
Dept: PHYSICAL THERAPY | Facility: CLINIC | Age: 69
End: 2019-11-01
Payer: MEDICARE

## 2019-11-01 DIAGNOSIS — Z96.651 AFTERCARE FOLLOWING RIGHT KNEE JOINT REPLACEMENT SURGERY: Primary | ICD-10-CM

## 2019-11-01 DIAGNOSIS — Z47.1 AFTERCARE FOLLOWING RIGHT KNEE JOINT REPLACEMENT SURGERY: Primary | ICD-10-CM

## 2019-11-01 DIAGNOSIS — M25.561 ACUTE PAIN OF RIGHT KNEE: ICD-10-CM

## 2019-11-01 DIAGNOSIS — R26.2 DIFFICULTY WALKING: ICD-10-CM

## 2019-11-01 PROCEDURE — 97110 THERAPEUTIC EXERCISES: CPT

## 2019-11-01 PROCEDURE — 97140 MANUAL THERAPY 1/> REGIONS: CPT

## 2019-11-01 NOTE — PROGRESS NOTES
Today's date: 2019  Patient name: Lindsey Amaral  : 1950  MRN: [id number]  Referring provider: Shonna Barney MD  Dx:   Encounter Diagnosis     ICD-10-CM    1  Aftercare following right knee joint replacement surgery Z47 1     Z96 651    2  Difficulty walking R26 2    3  Acute pain of right knee M25 561      Subjective:  "My back is bothering me; I can tolerate my knee pain  My back just feels unstable  I have noticed that I am able to go up and down stairs easier than last week  My R leg is feeling stronger "    Objective: See treatment log below    Assessment: Tolerated treatment well  Patient exhibited good technique with therapeutic exercises and would benefit from continued PT to increase R knee ROM/strength and endurance to improve mobility and gait  Plan: Continue per plan of care  Progress treatment as tolerated         Precautions:  R TKR SX - Replacement Repair / Replace    Daily Treatment Log  Manual  10/25 10/28 10/30 11/1    MT, ROM 15' 15' 15' 15'    HEP        Exercise Log 10/25 10/28 10/30 11/1    Balance Board 30x ea 30x ea 30x 30x ea    Chair Squats        P-Bar-GT-Forward, Backward,Side-Even & Dips        BOSU-Walk 5' 5' 5' 5'    Foam Pad SLR,Hip/KneeFl,Step Ups  (doubled) 30x ea 30x ea 30x     Foam Beam 6x ea 6x ea 6x     T/G-Squats PF 30x ea 30x ea 30x 30x ea    W/P-Hip-Abd,Add,Flex,Ext 17 5# 30x ea 25# 30x ea 25#-30x 25# 30x ea    WP-Squats  25# 30x 25#-30x 25# 30x ea    Trimax-TKE        Nsbbpti-QQ-Ke 2x2' 2x2' 2x2' 2x2'    NK Table Exer        NK Table ROM 2 5#-10' 2x10' 2x10' 2x10'    TM        Stepper        Bike 10' 10' 10' 10'    ME, PE 13' 15' 15' 15'            Modalities 10/25 10/28 10/30 11/1    MH&ES NT NT NA NT    US NT NT NA NT

## 2019-11-04 ENCOUNTER — OFFICE VISIT (OUTPATIENT)
Dept: PHYSICAL THERAPY | Facility: CLINIC | Age: 69
End: 2019-11-04
Payer: MEDICARE

## 2019-11-04 DIAGNOSIS — Z47.1 AFTERCARE FOLLOWING RIGHT KNEE JOINT REPLACEMENT SURGERY: Primary | ICD-10-CM

## 2019-11-04 DIAGNOSIS — M25.561 ACUTE PAIN OF RIGHT KNEE: ICD-10-CM

## 2019-11-04 DIAGNOSIS — R26.2 DIFFICULTY WALKING: ICD-10-CM

## 2019-11-04 DIAGNOSIS — Z96.651 AFTERCARE FOLLOWING RIGHT KNEE JOINT REPLACEMENT SURGERY: Primary | ICD-10-CM

## 2019-11-04 PROCEDURE — 97140 MANUAL THERAPY 1/> REGIONS: CPT

## 2019-11-04 PROCEDURE — 97110 THERAPEUTIC EXERCISES: CPT

## 2019-11-04 NOTE — PROGRESS NOTES
Today's date: 2019  Patient name: Elena Valdez  : 1950  MRN: [id number]  Referring provider: Felicia Maurer MD  Dx:   Encounter Diagnosis     ICD-10-CM    1  Aftercare following right knee joint replacement surgery Z47 1     Z96 651    2  Difficulty walking R26 2    3  Acute pain of right knee M25 561      Subjective:  No new c/o pain today  Objective: See treatment log below    Assessment: Tolerated treatment well  Patient exhibited good technique with therapeutic exercises and would benefit from continued PT to increase ROM/strength and endurance to improve mobility  Plan: Continue per plan of care  Progress treatment as tolerated  Precautions:  R TKR SX - Replacement Repair / Replace    Daily Treatment Log  Manual  10/25 10/28 10/30 11/1 11/4   MT, ROM 15' 15' 15' 15' 20'   HEP        Exercise Log 10/25 10/28 10/30 11/1 11/4   Balance Board 30x ea 30x ea 30x 30x ea 30x ea   Chair Squats        P-Bar-GT-Forward, Backward,Side-Even & Dips        BOSU-Walk 5' 5' 5' 5' 5'   Foam Pad SLR,Hip/KneeFl,Step Ups  (doubled) 30x ea 30x ea 30x  30x ea   Foam Beam 6x ea 6x ea 6x  12x ea   T/G-Squats PF 30x ea 30x ea 30x 30x ea 30x ea   W/P-Hip-Abd,Add,Flex,Ext 17 5# 30x ea 25# 30x ea 25#-30x 25# 30x ea 34  25#   30x ea   WP-Squats  25# 30x 25#-30x 25# 30x ea 34  25#   30x ea   Trimax-TKE        Efzraan-YW-Fr 2x2' 2x2' 2x2' 2x2' 2x2'   NK Table Exer        NK Table ROM 2 5#-10' 2x10' 2x10' 2x10' 2x10'   TM        Stepper        Bike 10' 10' 10' 10' 10'   ME, PE 13' 15' 15' 15' 15'           Modalities 10/25 10/28 10/30 11/1 11/4   MH&ES NT NT NA NT NT   US NT NT NA NT NT

## 2019-11-06 ENCOUNTER — OFFICE VISIT (OUTPATIENT)
Dept: PHYSICAL THERAPY | Facility: CLINIC | Age: 69
End: 2019-11-06
Payer: MEDICARE

## 2019-11-06 DIAGNOSIS — Z47.1 AFTERCARE FOLLOWING RIGHT KNEE JOINT REPLACEMENT SURGERY: Primary | ICD-10-CM

## 2019-11-06 DIAGNOSIS — R26.2 DIFFICULTY WALKING: ICD-10-CM

## 2019-11-06 DIAGNOSIS — Z96.651 AFTERCARE FOLLOWING RIGHT KNEE JOINT REPLACEMENT SURGERY: Primary | ICD-10-CM

## 2019-11-06 DIAGNOSIS — M25.561 ACUTE PAIN OF RIGHT KNEE: ICD-10-CM

## 2019-11-06 PROCEDURE — 97110 THERAPEUTIC EXERCISES: CPT

## 2019-11-06 PROCEDURE — 97140 MANUAL THERAPY 1/> REGIONS: CPT

## 2019-11-06 NOTE — PROGRESS NOTES
Today's date: 2019  Patient name: Dorie Thompson  : 1950  MRN: [id number]  Referring provider: Mckenna Walker MD  Dx:   Encounter Diagnosis     ICD-10-CM    1  Aftercare following right knee joint replacement surgery Z47 1     Z96 651    2  Difficulty walking R26 2    3  Acute pain of right knee M25 561      Subjective:  "I'm sore all over today  My R leg feels weak  I did a lot of lifting yesterday and in and out of my truck "  Patient reports post therapy that he is feeling a little better after having done his workout and getting his R knee stretched  Objective: See treatment log below    Assessment: Tolerated treatment well  Patient exhibited good technique with therapeutic exercises and would benefit from continued PT to increase R knee ROM/strength and endurance to improve mobility  Plan: Continue per plan of care  Progress treatment as tolerated  Precautions:  R TKR SX - Replacement Repair / Replace    Daily Treatment Log  Manual     MT, ROM 15'    20'   HEP        Exercise Log    Balance Board 30x ea    30x ea   Chair Squats        P-Bar-GT-Forward, Backward,Side-Even & Dips        BOSU-Walk     5'   Foam Pad SLR,Hip/KneeFl,Step Ups  (doubled)     30x ea   Foam Beam     12x ea   T/G-Squats PF 30x ea    30x ea   W/P-Hip-Abd,Add,Flex,Ext 27 5#   30x ea    34  25#   30x ea   WP-Squats 27 5#   30x ea    34  25#   30x ea   Trimax-TKE        Axkyuhc-XV-Im 2x2'    2x2'   NK Table Exer 7 5# 30x ea       NK Table ROM 2x10'    2x10'   TM        Stepper        Bike 10'    10'   ME, PE 15'    15'           Modalities    MH&ES NT    NT   US NT    NT

## 2019-11-08 ENCOUNTER — OFFICE VISIT (OUTPATIENT)
Dept: PHYSICAL THERAPY | Facility: CLINIC | Age: 69
End: 2019-11-08
Payer: MEDICARE

## 2019-11-08 DIAGNOSIS — R26.2 DIFFICULTY WALKING: ICD-10-CM

## 2019-11-08 DIAGNOSIS — Z96.651 AFTERCARE FOLLOWING RIGHT KNEE JOINT REPLACEMENT SURGERY: Primary | ICD-10-CM

## 2019-11-08 DIAGNOSIS — M25.561 ACUTE PAIN OF RIGHT KNEE: ICD-10-CM

## 2019-11-08 DIAGNOSIS — Z47.1 AFTERCARE FOLLOWING RIGHT KNEE JOINT REPLACEMENT SURGERY: Primary | ICD-10-CM

## 2019-11-08 PROCEDURE — 97140 MANUAL THERAPY 1/> REGIONS: CPT

## 2019-11-08 PROCEDURE — 97110 THERAPEUTIC EXERCISES: CPT

## 2019-11-08 NOTE — PROGRESS NOTES
Today's date: 2019  Patient name: Luis Carlos Stoll  : 1950  MRN: [id number]  Referring provider: Abhilash Pickard MD  Dx:   Encounter Diagnosis     ICD-10-CM    1  Aftercare following right knee joint replacement surgery Z47 1     Z96 651    2  Difficulty walking R26 2    3  Acute pain of right knee M25 561      Subjective:  No new c/o pain today  Objective: See treatment log below    Assessment: Tolerated treatment well  Patient exhibited good technique with therapeutic exercises and would benefit from continued PT to increase R knee ROM/strength and endurance to improve mobility  Plan: Continue per plan of care  Progress treatment as tolerated         Precautions:  R TKR SX - Replacement Repair / Replace    Daily Treatment Log  Manual        MT, ROM 15' 15'      HEP        Exercise Log       Balance Board 30x ea 30x ea      Chair Squats        P-Bar-GT-Forward, Backward,Side-Even & Dips        BOSU-Walk        Foam Pad SLR,Hip/KneeFl,Step Ups  (doubled)        Foam Beam        T/G-Squats PF 30x ea 30x ea      W/P-Hip-Abd,Add,Flex,Ext 27 5#   30x ea 27 5#  30x ea      WP-Squats 27 5#   30x ea 27 5#   30x ea      Trimax-TKE        Wqiggam-GG-Xt 2x2' 2x2'      NK Table Exer 7 5# 30x ea 7 5# 30x ea      NK Table ROM 2x10' 2x10'      TM        Stepper        Bike 10' 10'      ME, PE 15' 15'              Modalities       MH&ES NT NT      US NT NT

## 2019-11-11 ENCOUNTER — OFFICE VISIT (OUTPATIENT)
Dept: PHYSICAL THERAPY | Facility: CLINIC | Age: 69
End: 2019-11-11
Payer: MEDICARE

## 2019-11-11 DIAGNOSIS — R26.2 DIFFICULTY WALKING: ICD-10-CM

## 2019-11-11 DIAGNOSIS — Z96.651 AFTERCARE FOLLOWING RIGHT KNEE JOINT REPLACEMENT SURGERY: Primary | ICD-10-CM

## 2019-11-11 DIAGNOSIS — Z47.1 AFTERCARE FOLLOWING RIGHT KNEE JOINT REPLACEMENT SURGERY: Primary | ICD-10-CM

## 2019-11-11 DIAGNOSIS — M25.561 ACUTE PAIN OF RIGHT KNEE: ICD-10-CM

## 2019-11-11 PROCEDURE — 97140 MANUAL THERAPY 1/> REGIONS: CPT | Performed by: PHYSICAL THERAPIST

## 2019-11-11 PROCEDURE — 97110 THERAPEUTIC EXERCISES: CPT | Performed by: PHYSICAL THERAPIST

## 2019-11-11 PROCEDURE — 97112 NEUROMUSCULAR REEDUCATION: CPT | Performed by: PHYSICAL THERAPIST

## 2019-11-11 NOTE — PROGRESS NOTES
Today's date: 2019  Patient name: Juancho Redmond  : 1950  MRN: [id number]  Referring provider: Simona Muñoz MD  Dx:   Encounter Diagnosis     ICD-10-CM    1  Aftercare following right knee joint replacement surgery Z47 1     Z96 651    2  Difficulty walking R26 2    3  Acute pain of right knee M25 561      Subjective:  Sid Beach states his right knee is slowly feeling better  Objective: See treatment log below    Assessment: Tolerated treatment well  Patient exhibited good technique with therapeutic exercises and would benefit from continued PT  Plan: Continue per plan of care  Progress treatment as tolerated          Precautions:  R TKR SX - Replacement Repair / Replace    Daily Treatment Log  Manual       MT, ROM 15' 15' 15'     HEP        Exercise Log      Balance Board 30x ea 30x ea 30x     Chair Squats        P-Bar-GT-Forward, Backward,Side-Even & Dips        BOSU-Walk        Foam Pad SLR,Hip/KneeFl,Step Ups  (doubled)        Foam Beam        T/G-Squats PF 30x ea 30x ea 30x     W/P-Hip-Abd,Add,Flex,Ext 27 5#   30x ea 27 5#  30x ea 27 5#-30x     WP-Squats 27 5#   30x ea 27 5#   30x ea 27 5#-30x     Trimax-TKE        Dpufxxn-DN-Ot 2x2' 2x2' 2x2'     NK Table Exer 7 5# 30x ea 7 5# 30x ea 7 5#-30x     NK Table ROM 2x10' 2x10' 2x10'     TM        Stepper        Bike 10' 10' 10'     ME, PE 13' 15' 15'             Modalities      MH&ES NT NT NT     US NT NT NT

## 2019-11-13 ENCOUNTER — OFFICE VISIT (OUTPATIENT)
Dept: PHYSICAL THERAPY | Facility: CLINIC | Age: 69
End: 2019-11-13
Payer: MEDICARE

## 2019-11-13 DIAGNOSIS — Z96.651 AFTERCARE FOLLOWING RIGHT KNEE JOINT REPLACEMENT SURGERY: Primary | ICD-10-CM

## 2019-11-13 DIAGNOSIS — Z47.1 AFTERCARE FOLLOWING RIGHT KNEE JOINT REPLACEMENT SURGERY: Primary | ICD-10-CM

## 2019-11-13 DIAGNOSIS — M25.561 ACUTE PAIN OF RIGHT KNEE: ICD-10-CM

## 2019-11-13 DIAGNOSIS — R26.2 DIFFICULTY WALKING: ICD-10-CM

## 2019-11-13 PROCEDURE — 97110 THERAPEUTIC EXERCISES: CPT

## 2019-11-13 PROCEDURE — 97140 MANUAL THERAPY 1/> REGIONS: CPT

## 2019-11-13 NOTE — PROGRESS NOTES
Today's date: 2019  Patient name: Donnell Roldan  : 1950  MRN: [id number]  Referring provider: Andrews Varner MD  Dx:   Encounter Diagnosis     ICD-10-CM    1  Aftercare following right knee joint replacement surgery Z47 1     Z96 651    2  Difficulty walking R26 2    3  Acute pain of right knee M25 561      Subjective:  No new c/o pain today  "I really liked the stepper, good workout "  "My R leg feels lighter after the manual therapy "    Objective: See treatment log below    Assessment: Tolerated treatment well  Patient exhibited good technique with therapeutic exercises and would benefit from continued PT to increase R knee ROM/strength and endurance to improve mobility and gait  Plan: Continue per plan of care  Progress treatment as tolerated         Precautions:  R TKR SX - Replacement Repair / Replace    Daily Treatment Log  Manual      MT, ROM 15' 15' 15' 15'    HEP        Exercise Log     Balance Board 30x ea 30x ea 30x 30x ea    Chair Squats        P-Bar-GT-Forward, Backward,Side-Even & Dips        BOSU-Walk        Foam Pad SLR,Hip/KneeFl,Step Ups  (doubled)        Foam Beam        T/G-Squats PF 30x ea 30x ea 30x 30x ea    W/P-Hip-Abd,Add,Flex,Ext 27 5#   30x ea 27 5#  30x ea 27 5#-30x 27 5#  30x ea    WP-Squats 27 5#   30x ea 27 5#   30x ea 27 5#-30x 27 5# 30x ea    Trimax-TKE        Rvvpayj-WP-Vc 2x2' 2x2' 2x2' 2x2'    NK Table Exer 7 5# 30x ea 7 5# 30x ea 7 5#-30x     NK Table ROM 2x10' 2x10' 2x10' 2x10'    TM        Stepper 1-1    10'    Bike 10' 10' 10' 10'    ME, PE 13' 15' 15' 15'            Modalities     MH&ES NT NT NT NT    US NT NT NT NT

## 2019-11-15 ENCOUNTER — OFFICE VISIT (OUTPATIENT)
Dept: PHYSICAL THERAPY | Facility: CLINIC | Age: 69
End: 2019-11-15
Payer: MEDICARE

## 2019-11-15 DIAGNOSIS — Z47.1 AFTERCARE FOLLOWING RIGHT KNEE JOINT REPLACEMENT SURGERY: Primary | ICD-10-CM

## 2019-11-15 DIAGNOSIS — M25.561 ACUTE PAIN OF RIGHT KNEE: ICD-10-CM

## 2019-11-15 DIAGNOSIS — Z96.651 AFTERCARE FOLLOWING RIGHT KNEE JOINT REPLACEMENT SURGERY: Primary | ICD-10-CM

## 2019-11-15 DIAGNOSIS — R26.2 DIFFICULTY WALKING: ICD-10-CM

## 2019-11-15 PROCEDURE — 97140 MANUAL THERAPY 1/> REGIONS: CPT | Performed by: PHYSICAL THERAPIST

## 2019-11-15 PROCEDURE — 97110 THERAPEUTIC EXERCISES: CPT | Performed by: PHYSICAL THERAPIST

## 2019-11-15 PROCEDURE — 97112 NEUROMUSCULAR REEDUCATION: CPT | Performed by: PHYSICAL THERAPIST

## 2019-11-15 NOTE — PROGRESS NOTES
Today's date: 11/15/2019  Patient name: Prabha Davila  : 1950  MRN: [id number]  Referring provider: Anuja Upton MD  Dx:   Encounter Diagnosis     ICD-10-CM    1  Aftercare following right knee joint replacement surgery Z47 1     Z96 651    2  Difficulty walking R26 2    3  Acute pain of right knee M25 561      Subjective:  Winter Muniz states his right knee is slowly feeling better  He is standing longer and walking further  He still can not fully extend or flex his knee  Objective: See treatment log below    Assessment: Tolerated treatment well  Patient exhibited good technique with therapeutic exercises and would benefit from continued PT    Plan: Continue per plan of care  Progress treatment as tolerated         Precautions:  R TKR SX - Replacement Repair / Replace    Daily Treatment Log  Manual  11/6 11/8 11/11 11/13 11/15   MT, ROM 15' 15' 15' 15' 15'   HEP        Exercise Log 11/6 11/8 11/11 11/13 11/15   Balance Board 30x ea 30x ea 30x 30x ea 30x   Chair Squats        P-Bar-GT-Forward, Backward,Side-Even & Dips     10x   BOSU-Walk     5'   Foam Pad SLR,Hip/KneeFl,Step Ups  (doubled)     30x   Foam Beam     30x   T/G-Squats PF 30x ea 30x ea 30x 30x ea 30x   W/P-Hip-Abd,Add,Flex,Ext 27 5#   30x ea 27 5#  30x ea 27 5#-30x 27 5#  30x ea 27 5#-30x   WP-Squats 27 5#   30x ea 27 5#   30x ea 27 5#-30x 27 5# 30x ea 27 5#-30x   Trimax-TKE        Cxohoik-LU-Rl 2x2' 2x2' 2x2' 2x2' 2x2'   NK Table Exer 7 5# 30x ea 7 5# 30x ea 7 5#-30x  7 5#-30x   NK Table ROM 2x10' 2x10' 2x10' 2x10' 2x10'   TM        Stepper 1-1    10' 10'   Bike 10' 10' 10' 10' 10'   ME, PE 13' 15' 15' 15' 15'           Modalities 11/6 11/8 11/11 11/13 11/15   MH&ES NT NT NT NT NT   US NT NT NT NT NT

## 2019-11-18 ENCOUNTER — OFFICE VISIT (OUTPATIENT)
Dept: PHYSICAL THERAPY | Facility: CLINIC | Age: 69
End: 2019-11-18
Payer: MEDICARE

## 2019-11-18 DIAGNOSIS — Z96.651 AFTERCARE FOLLOWING RIGHT KNEE JOINT REPLACEMENT SURGERY: Primary | ICD-10-CM

## 2019-11-18 DIAGNOSIS — Z47.1 AFTERCARE FOLLOWING RIGHT KNEE JOINT REPLACEMENT SURGERY: Primary | ICD-10-CM

## 2019-11-18 DIAGNOSIS — M25.561 ACUTE PAIN OF RIGHT KNEE: ICD-10-CM

## 2019-11-18 DIAGNOSIS — R26.2 DIFFICULTY WALKING: ICD-10-CM

## 2019-11-18 PROCEDURE — 97110 THERAPEUTIC EXERCISES: CPT | Performed by: PHYSICAL THERAPIST

## 2019-11-18 PROCEDURE — 97112 NEUROMUSCULAR REEDUCATION: CPT | Performed by: PHYSICAL THERAPIST

## 2019-11-18 PROCEDURE — 97140 MANUAL THERAPY 1/> REGIONS: CPT | Performed by: PHYSICAL THERAPIST

## 2019-11-18 NOTE — PROGRESS NOTES
Today's date: 2019  Patient name: Prabha Davila  : 1950  MRN: [id number]  Referring provider: Anuja Upton MD  Dx:   Encounter Diagnosis     ICD-10-CM    1  Aftercare following right knee joint replacement surgery Z47 1     Z96 651    2  Difficulty walking R26 2    3  Acute pain of right knee M25 561      Subjective:  Winter Muniz states his right knee is getting better  He is walking a lot better  He can use stairs better and can stand better  He has a lot less pain now  Objective: See treatment log below    Assessment: Tolerated treatment well  Patient exhibited good technique with therapeutic exercises and would benefit from continued PT    Plan: Continue per plan of care  Progress treatment as tolerated         Precautions:  R TKR SX - Replacement Repair / Replace    Daily Treatment Log  Manual         MT, ROM 15'       HEP        Exercise Log        Balance Board 30x ea       Chair Squats        P-Bar-GT-Forward, Backward,Side-Even & Dips 10x       BOSU-Walk 5'       Foam Pad SLR,Hip/KneeFl,Step Ups  (doubled) 30x       Foam Beam 30x       T/G-Squats PF 30x ea       W/P-Hip-Abd,Add,Flex,Ext 27 5#   30x ea       WP-Squats 27 5#   30x ea       Trimax-TKE        Ubzzypd-ZJ-Eo 2x2'       NK Table Exer 7 5# 30x ea       NK Table ROM 2x10'       TM        Stepper 1-1 10'       Bike 10'       ME, PE 15'               Modalities        MH&ES NT       US NT

## 2019-11-20 ENCOUNTER — OFFICE VISIT (OUTPATIENT)
Dept: PHYSICAL THERAPY | Facility: CLINIC | Age: 69
End: 2019-11-20
Payer: MEDICARE

## 2019-11-20 DIAGNOSIS — R26.2 DIFFICULTY WALKING: ICD-10-CM

## 2019-11-20 DIAGNOSIS — Z96.651 AFTERCARE FOLLOWING RIGHT KNEE JOINT REPLACEMENT SURGERY: Primary | ICD-10-CM

## 2019-11-20 DIAGNOSIS — M25.561 ACUTE PAIN OF RIGHT KNEE: ICD-10-CM

## 2019-11-20 DIAGNOSIS — Z47.1 AFTERCARE FOLLOWING RIGHT KNEE JOINT REPLACEMENT SURGERY: Primary | ICD-10-CM

## 2019-11-20 PROCEDURE — 97140 MANUAL THERAPY 1/> REGIONS: CPT

## 2019-11-20 PROCEDURE — 97110 THERAPEUTIC EXERCISES: CPT

## 2019-11-20 NOTE — PROGRESS NOTES
Today's date: 2019  Patient name: Jennifer Baca  : 1950  MRN: [id number]  Referring provider: Faith Porras MD  Dx:   Encounter Diagnosis     ICD-10-CM    1  Aftercare following right knee joint replacement surgery Z47 1     Z96 651    2  Difficulty walking R26 2    3  Acute pain of right knee M25 561      Subjective:  No new c/o pain today  "I'm seeing a chiropractor now and a massage therapist along with the physical therapy  I have a lot of issues, not just my R knee  Hopefully you all can get me back to where I need to be "    Objective: See treatment log below    Assessment: Tolerated treatment well  Patient exhibited good technique with therapeutic exercises and would benefit from continued PT to increase R knee ROM/strength and endurance to improve mobility and gait  Plan: Continue per plan of care  Progress treatment as tolerated           Precautions:  R TKR SX - Replacement Repair / Replace    Daily Treatment Log  Manual        MT, ROM 15' 15'      HEP        Exercise Log       Balance Board 30x ea       Chair Squats        P-Bar-GT-Forward, Backward,Side-Even & Dips 10x       BOSU-Walk 5'       Foam Pad SLR,Hip/KneeFl,Step Ups  (doubled) 30x       Foam Beam 30x       T/G-Squats PF 30x ea 30x ea      W/P-Hip-Abd,Add,Flex,Ext 27 5#   30x ea 27 5#   30x ea      WP-Squats 27 5#   30x ea 27 5#   30x ea      Trimax-TKE        Lyfnstp-CP-Xk 2x2' 2x2'      NK Table Exer 7 5# 30x ea       NK Table ROM 2x10' 2x10'      TM        Stepper 1-1 10'       Bike 10' 10'      ME, PE 15' 15'              Modalities       MH&ES NT NT      US NT NT

## 2019-11-22 ENCOUNTER — OFFICE VISIT (OUTPATIENT)
Dept: PHYSICAL THERAPY | Facility: CLINIC | Age: 69
End: 2019-11-22
Payer: MEDICARE

## 2019-11-22 DIAGNOSIS — R26.2 DIFFICULTY WALKING: ICD-10-CM

## 2019-11-22 DIAGNOSIS — M25.561 ACUTE PAIN OF RIGHT KNEE: ICD-10-CM

## 2019-11-22 DIAGNOSIS — Z47.1 AFTERCARE FOLLOWING RIGHT KNEE JOINT REPLACEMENT SURGERY: Primary | ICD-10-CM

## 2019-11-22 DIAGNOSIS — Z96.651 AFTERCARE FOLLOWING RIGHT KNEE JOINT REPLACEMENT SURGERY: Primary | ICD-10-CM

## 2019-11-22 PROCEDURE — 97112 NEUROMUSCULAR REEDUCATION: CPT | Performed by: PHYSICAL THERAPIST

## 2019-11-22 PROCEDURE — 97110 THERAPEUTIC EXERCISES: CPT | Performed by: PHYSICAL THERAPIST

## 2019-11-22 PROCEDURE — 97140 MANUAL THERAPY 1/> REGIONS: CPT | Performed by: PHYSICAL THERAPIST

## 2019-11-22 NOTE — PROGRESS NOTES
Today's date: 2019  Patient name: Kj Scott  : 1950  MRN: [id number]  Referring provider: Sarah Chakraborty MD  Dx:   Encounter Diagnosis     ICD-10-CM    1  Aftercare following right knee joint replacement surgery Z47 1     Z96 651    2  Difficulty walking R26 2    3  Acute pain of right knee M25 561      Subjective:  Elena Rasheed states his right knee is feeling better  Objective: See treatment log below    Assessment: Tolerated treatment well  Patient exhibited good technique with therapeutic exercises and would benefit from continued PT    Plan: Continue per plan of care  Progress treatment as tolerated         Precautions:  R TKR SX - Replacement Repair / Replace    Daily Treatment Log  Manual       MT, ROM 15' 15' 15'     HEP        Exercise Log      Balance Board 30x ea  30x     Chair Squats        P-Bar-GT-Forward, Backward,Side-Even & Dips 10x  10x     BOSU-Walk 5'  5'     Foam Pad SLR,Hip/KneeFl,Step Ups  (doubled) 30x  30x     Foam Beam 30x  30x     T/G-Squats PF 30x ea 30x ea 30x     W/P-Hip-Abd,Add,Flex,Ext 27 5#   30x ea 27 5#   30x ea 27 5#-30x     WP-Squats 27 5#   30x ea 27 5#   30x ea 27 5#-30x     Trimax-TKE        Lzkxvkt-KY-Iv 2x2' 2x2' 2x2'     NK Table Exer 7 5# 30x ea  15#-30x     NK Table ROM 2x10' 2x10' 2x10'     TM        Stepper 1-1 10'  10'     Bike 10' 10' 10'     ME, PE 15' 15' 15'             Modalities      MH&ES NT NT      US NT NT

## 2019-11-25 ENCOUNTER — OFFICE VISIT (OUTPATIENT)
Dept: PHYSICAL THERAPY | Facility: CLINIC | Age: 69
End: 2019-11-25
Payer: MEDICARE

## 2019-11-25 ENCOUNTER — TRANSCRIBE ORDERS (OUTPATIENT)
Dept: PHYSICAL THERAPY | Facility: CLINIC | Age: 69
End: 2019-11-25

## 2019-11-25 DIAGNOSIS — Z96.651 AFTERCARE FOLLOWING RIGHT KNEE JOINT REPLACEMENT SURGERY: Primary | ICD-10-CM

## 2019-11-25 DIAGNOSIS — Z47.1 AFTERCARE FOLLOWING RIGHT KNEE JOINT REPLACEMENT SURGERY: Primary | ICD-10-CM

## 2019-11-25 DIAGNOSIS — M25.561 ACUTE PAIN OF RIGHT KNEE: ICD-10-CM

## 2019-11-25 DIAGNOSIS — R26.2 DIFFICULTY WALKING: ICD-10-CM

## 2019-11-25 PROCEDURE — 97110 THERAPEUTIC EXERCISES: CPT | Performed by: PHYSICAL THERAPIST

## 2019-11-25 PROCEDURE — 97164 PT RE-EVAL EST PLAN CARE: CPT | Performed by: PHYSICAL THERAPIST

## 2019-11-25 PROCEDURE — 97112 NEUROMUSCULAR REEDUCATION: CPT | Performed by: PHYSICAL THERAPIST

## 2019-11-25 PROCEDURE — 97140 MANUAL THERAPY 1/> REGIONS: CPT | Performed by: PHYSICAL THERAPIST

## 2019-11-25 NOTE — PROGRESS NOTES
PT Re-Evaluation   Today's date: 2019  Patient name: Merly Maxwell  : 1950  MRN: [id number]  Referring provider: Ac Carty MD  Dx:   Encounter Diagnosis     ICD-10-CM    1  Aftercare following right knee joint replacement surgery Z47 1     Z96 651    2  Difficulty walking R26 2    3  Acute pain of right knee M25 561      Assessment  Assessment details: Patient knows he is progressing well  His right knee still does not bend well but is getting better  He is now finally almost entirely straight with his knee  Impairments: abnormal or restricted ROM, abnormal movement, activity intolerance, impaired physical strength and pain with function  Understanding of Dx/Px/POC: excellent  Goals  STG 2-4 weeks:    Increase B LE strength 2-5 lbs  Decrease pain by 1-2 levels on 1-10 pain scale  Increase standing/walking tolerance to >30 minutes  Patient independent with HEP  LTG 6-8 weeks:   Increase B LE strength 10-20 lbs  Decrease pain to 0-2/10 with activity  Increase single leg stance >30 seconds  Increase standing/walking tolerance to >90 minutes  Increase range of motion to normal   Improve gait pattern, coordination and balance to normal   D/C with HEP  Plan  Plan details: All planned modality interventions and planned therapy interventions are provided PRN      Patient would benefit from: PT eval and skilled physical therapy  Planned modality interventions: unattended electrical stimulation  Planned therapy interventions: joint mobilization, manual therapy, balance, balance/weight bearing training, muscle pump exercises, neuromuscular re-education, patient education, postural training, self care, strengthening, stretching, therapeutic activities, therapeutic exercise, therapeutic training, transfer training, home exercise program, graded exercise, gait training, flexibility and coordination  Frequency: 3x week  Duration in weeks: 6  Treatment plan discussed with: patient      Subjective Evaluation    Pain  Quality: discomfort, knife-like, pulling, squeezing, sharp, throbbing and tight  Aggravating factors: lifting, running, stair climbing, walking and standing  Progression: improved    Treatments  Previous treatment: physical therapy  Current treatment: physical therapy  Patient Goals  Patient goals for therapy: decreased pain, improved balance, increased motion, return to work, return to [employer] activities, independence with ADLs/IADLs and increased strength        Objective     Date of onset:  9/10/2019    Date of Surgery:  9/10/2019    History of Present Episode: 9/19/2019  Viktoriya Mendoza states his original Right Total Knee Replacement became infected  He just had his old R TKR SX removed and installed a new R TKR SX  Past Medical History:    9/19/2019  Viktoriya Mendoza reports R TKR SX   Neck Sx  Pain still in buttocks  L TKR SX     Previous Level of Functional Ability:  9/19/2019  Viktoriya Mendoza states his right knee became worse and worse and he finally went for a R TKR SX replacement  Inspection / Palpation:  Knee:  9/19/2019  Mesomorphic / Endomorphic body type  No signs of infection  No signs of wounds  Mild signs of drainage  Moderate signs of ecchymotic regions  Moderate signs of erythremic regions  Moderate signs of muscle spasm  Moderate signs of muscle guarding  Moderate signs of tenderness reported to palpation  Moderate signs of swelling  Positive signs of a surgery site  Current conditions appears consistent with recent acute R TKR SX episode  Chief Complaints:  9/19/2019  Bjorn reports moderate to severe difficulty with standing  Viktoriya Mendoza reports moderate to severe difficulty with walking  Viktoriya Mendoza reports moderate to severe difficulty with movement / use of his right knee  Viktoriya Mendoza reports moderate to severe difficulty with use of stairs  Viktoriya Mendoza reports severe difficulty with running  Viktoriya Mendoza reports severe difficulty with jumping    Viktoriya Mendoza reports moderate to severe difficulty with use of inclines  Dayami Richardson reports moderate difficulty with sleeping  Dayami Richardson reports moderate to severe difficulty with his strength and endurance  Dayami Richardson reports moderate to severe limitations with his range of motion  Dayami Richardson reports moderate to severe difficulty lying on his right knee region      KNEE PAIN Resting Moving Palpation Standing Walking   9/19/2019 Lt 0 0 0 0 0   9/19/2019 Rt 3-5 5-8 5-8 4-8 4-8   10/22/2019 Rt 1-3 2-5 2-5 2-5 2-5   11/13/2019 Rt 1-3 1-4 1-4 1-4 1-4   11/25/2019 Rt 1-3 1-4 1-4 1-4 1-4     KNEE PAIN Running Stairs Sleeping Twisting Jumping   9/19/2019 Lt 0 0 0 0 0   9/19/2019 Rt NA 8-10 4-8 5-9 NA   10/22/2019 Rt NA 3-6 2-5 2-5 NA   11/13/2019 Rt NA 2-5 1-4 1-4 NA   11/25/2019 Rt NA 1-5 1-4 1-4 NA     KNEE/ AROM Flexion Extension SLR   9/19/2019 Lt 135° 0° 85°   9/19/2019 Rt 54° 12° 74°   10/22/2019 Rt 105° 4° 81°   11/13/2019 Rt 115° 1° 89°   11/25/2019 ° 0° 92°     KNEE MMT Flexion Extension Varus Stress Valgus Stress   9/19/2019 Lt 0/10  45 lbs 0/10  38 lbs 0/10  37 lbs 0/10  36 lbs   9/19/2019 Rt 7/10  6 lbs 7/10  3 lbs 7/10  5 lbs 7/10  4 lbs   10/22/2019 Rt 3/10  17 lbs 3/10  15 lbs 3/10  17 lbs 3/10  16 lbs   11/13/2019 Rt 2/10  21 lbs 2/10  18 lbs 2/10  21 lbs 2/10  20 lbs   11/25/2019 Rt 2/10  23 lbs 2/10  22 lbs 2/10  22 lbs 2/10  22 lbs     Precautions:  R TKR SX - Replacement Repair / Replace

## 2019-11-25 NOTE — LETTER
2019  Signature Required / [address] / PT Reevaluation  Sachin Barrientos MD  [address] [facility]  [address] [place] [address]    Patient: Lucia Estrada  YOB: 1950   Date of Visit: 2019     Encounter Diagnosis     ICD-10-CM    1  Aftercare following right knee joint replacement surgery Z47 1     Z96 651    2  Difficulty walking R26 2    3  Acute pain of right knee M25 561      Dear Dr Daniela Funez:  Thank you for your recent referral of Lucia Estrada    Please review the attached evaluation summary from Bjorn's recent visit  Please verify that you agree with the plan of care by signing the attached order  If you have any questions or concerns, please do not hesitate to call  I sincerely appreciate the opportunity to share in the care of one of your patients and hope to have another opportunity to work with you in the near future  Sincerely,    Vicky Funez, PT    Referring Provider:      I certify that I have read the below Plan of Care and certify the need for these services furnished under this plan of treatment while under my care  Sachin Barrientos MD  [address] [place] [address]  [address] [place] [address]  VIA Facsimile: [phone]    Please SIGN ABOVE and return THIS PAGE ONLY to Fax # [phone]        Today's date: 2019  Patient name: Lucia Estrada  : 1950  MRN: [id number]  Referring provider: Lupe Ruiz MD  Dx:   Encounter Diagnosis     ICD-10-CM    1  Aftercare following right knee joint replacement surgery Z47 1     Z96 651    2  Difficulty walking R26 2    3  Acute pain of right knee M25 561      Subjective:  Daniel Booth states his right knee is feeling better  He can stand longer and walk further  Objective: See treatment log below    Assessment: Tolerated treatment well  Patient exhibited good technique with therapeutic exercises and would benefit from continued PT    Plan: Continue per plan of care    Progress treatment as tolerated  Precautions:  R TKR SX - Replacement Repair / Replace    Daily Treatment Log  Manual      MT, ROM 15' 15' 15' 15'    HEP        Exercise Log     Balance Board 30x ea  30x 30x    Chair Squats        P-Bar-GT-Forward, Backward,Side-Even & Dips 10x  10x 10x    BOSU-Walk 5'  5' 5'    Foam Pad SLR,Hip/KneeFl,Step Ups  (doubled) 30x  30x 30x    Foam Beam 30x  30x 30x    T/G-Squats PF 30x ea 30x ea 30x 30x    W/P-Hip-Abd,Add,Flex,Ext 27 5#   30x ea 27 5#   30x ea 27 5#-30x 27 5#-30x    WP-Squats 27 5#   30x ea 27 5#   30x ea 27 5#-30x 27 5#-30x    Trimax-TKE        Twmvbds-MC-Qp 2x2' 2x2' 2x2' 2x2'    NK Table Exer 7 5# 30x ea  15#-30x 15#-30x    NK Table ROM 2x10' 2x10' 2x10' 2x10'    TM        Stepper 1-1 10'  10' 10'    Bike 10' 10' 10' 10'    ME, PE 15' 15' 15' 15'            Modalities     MH&ES NT NT      US NT NT          PT Re-Evaluation   Today's date: 2019  Patient name: Dorie Thompson  : 1950  MRN: [id number]  Referring provider: Mckenna Walker MD  Dx:   Encounter Diagnosis     ICD-10-CM    1  Aftercare following right knee joint replacement surgery Z47 1     Z96 651    2  Difficulty walking R26 2    3  Acute pain of right knee M25 561      Assessment  Assessment details: Patient knows he is progressing well  His right knee still does not bend well but is getting better  He is now finally almost entirely straight with his knee  Impairments: abnormal or restricted ROM, abnormal movement, activity intolerance, impaired physical strength and pain with function  Understanding of Dx/Px/POC: excellent  Goals  STG 2-4 weeks:    Increase B LE strength 2-5 lbs  Decrease pain by 1-2 levels on 1-10 pain scale  Increase standing/walking tolerance to >30 minutes  Patient independent with HEP  LTG 6-8 weeks:   Increase B LE strength 10-20 lbs  Decrease pain to 0-2/10 with activity     Increase single leg stance >30 seconds  Increase standing/walking tolerance to >90 minutes  Increase range of motion to normal   Improve gait pattern, coordination and balance to normal   D/C with HEP  Plan  Plan details: All planned modality interventions and planned therapy interventions are provided PRN  Patient would benefit from: PT eval and skilled physical therapy  Planned modality interventions: unattended electrical stimulation  Planned therapy interventions: joint mobilization, manual therapy, balance, balance/weight bearing training, muscle pump exercises, neuromuscular re-education, patient education, postural training, self care, strengthening, stretching, therapeutic activities, therapeutic exercise, therapeutic training, transfer training, home exercise program, graded exercise, gait training, flexibility and coordination  Frequency: 3x week  Duration in weeks: 6  Treatment plan discussed with: patient      Subjective Evaluation    Pain  Quality: discomfort, knife-like, pulling, squeezing, sharp, throbbing and tight  Aggravating factors: lifting, running, stair climbing, walking and standing  Progression: improved    Treatments  Previous treatment: physical therapy  Current treatment: physical therapy  Patient Goals  Patient goals for therapy: decreased pain, improved balance, increased motion, return to work, return to [employer] activities, independence with ADLs/IADLs and increased strength        Objective     Date of onset:  9/10/2019    Date of Surgery:  9/10/2019    History of Present Episode: 9/19/2019  [facility] states his original Right Total Knee Replacement became infected  He just had his old R TKR SX removed and installed a new R TKR SX  Past Medical History:    9/19/2019  [facility] reports R TKR SX   Neck Sx  Pain still in buttocks    L TKR SX     Previous Level of Functional Ability:  9/19/2019  [facility] states his right knee became worse and worse and he finally went for a R TKR SX replacement  Inspection / Palpation:  Knee:  9/19/2019  Mesomorphic / Endomorphic body type  No signs of infection  No signs of wounds  Mild signs of drainage  Moderate signs of ecchymotic regions  Moderate signs of erythremic regions  Moderate signs of muscle spasm  Moderate signs of muscle guarding  Moderate signs of tenderness reported to palpation  Moderate signs of swelling  Positive signs of a surgery site  Current conditions appears consistent with recent acute R TKR SX episode  Chief Complaints:  9/19/2019  Bjorn reports moderate to severe difficulty with standing  Veverly Ortiz reports moderate to severe difficulty with walking  Veverly Ortiz reports moderate to severe difficulty with movement / use of his right knee  Veverly Ortiz reports moderate to severe difficulty with use of stairs  Veverly Ortiz reports severe difficulty with running  Veverly Ortiz reports severe difficulty with jumping  Veverly Ortiz reports moderate to severe difficulty with use of inclines  Veverly Ortiz reports moderate difficulty with sleeping  Veverly Ortiz reports moderate to severe difficulty with his strength and endurance  Veverly Ortiz reports moderate to severe limitations with his range of motion  Veverly Diana reports moderate to severe difficulty lying on his right knee region      KNEE PAIN Resting Moving Palpation Standing Walking   9/19/2019 Lt 0 0 0 0 0   9/19/2019 Rt 3-5 5-8 5-8 4-8 4-8   10/22/2019 Rt 1-3 2-5 2-5 2-5 2-5   11/13/2019 Rt 1-3 1-4 1-4 1-4 1-4   11/25/2019 Rt 1-3 1-4 1-4 1-4 1-4     KNEE PAIN Running Stairs Sleeping Twisting Jumping   9/19/2019 Lt 0 0 0 0 0   9/19/2019 Rt NA 8-10 4-8 5-9 NA   10/22/2019 Rt NA 3-6 2-5 2-5 NA   11/13/2019 Rt NA 2-5 1-4 1-4 NA   11/25/2019 Rt NA 1-5 1-4 1-4 NA     KNEE/ AROM Flexion Extension SLR   9/19/2019 Lt 135° 0° 85°   9/19/2019 Rt 54° 12° 74°   10/22/2019 Rt 105° 4° 81°   11/13/2019 Rt 115° 1° 89°   11/25/2019 ° 0° 92°     KNEE MMT Flexion Extension Varus Stress Valgus Stress 9/19/2019 Lt 0/10  45 lbs 0/10  38 lbs 0/10  37 lbs 0/10  36 lbs   9/19/2019 Rt 7/10  6 lbs 7/10  3 lbs 7/10  5 lbs 7/10  4 lbs   10/22/2019 Rt 3/10  17 lbs 3/10  15 lbs 3/10  17 lbs 3/10  16 lbs   11/13/2019 Rt 2/10  21 lbs 2/10  18 lbs 2/10  21 lbs 2/10  20 lbs   11/25/2019 Rt 2/10  23 lbs 2/10  22 lbs 2/10  22 lbs 2/10  22 lbs     Precautions:  R TKR SX - Replacement Repair / Replace

## 2019-11-25 NOTE — PROGRESS NOTES
PT Re-Evaluation   Today's date: 2019    Patient name: Greg Coburn  : 1950  MRN: [id number]  Referring provider: Marcel Almazan MD  Dx:   Encounter Diagnosis     ICD-10-CM    1  Aftercare following right knee joint replacement surgery Z47 1     Z96 651    2  Difficulty walking R26 2    3  Acute pain of right knee M25 561      Assessment  Assessment details: Patient is improving steadily from his R TKR Sx  Impairments: abnormal gait, abnormal or restricted ROM, abnormal movement, activity intolerance, impaired balance, impaired physical strength, pain with function, safety issue and weight-bearing intolerance  Understanding of Dx/Px/POC: excellent  Goals  STG 2-4 weeks:    Increase B LE strength 2-5 lbs  Decrease pain by 1-2 levels on 1-10 pain scale  Increase standing/walking tolerance to >30 minutes  Patient independent with HEP  LTG 6-8 weeks:   Increase B LE strength 10-20 lbs  Decrease pain to 0-2/10 with activity  Increase single leg stance >30 seconds  Increase standing/walking tolerance to >90 minutes  Increase range of motion to normal   Improve gait pattern, coordination and balance to normal   D/C with HEP  Plan  Plan details: All planned modality interventions and planned therapy interventions are provided PRN      Patient would benefit from: PT eval and skilled physical therapy  Planned modality interventions: unattended electrical stimulation  Planned therapy interventions: joint mobilization, manual therapy, balance, balance/weight bearing training, muscle pump exercises, neuromuscular re-education, patient education, postural training, self care, strengthening, stretching, therapeutic activities, therapeutic exercise, therapeutic training, transfer training, graded exercise, gait training, flexibility, coordination and home exercise program  Frequency: 3x week  Duration in weeks: 6  Treatment plan discussed with: patient      Subjective Evaluation    Pain  Quality: discomfort, pulling, squeezing, sharp and tight  Aggravating factors: stair climbing, running, lifting, walking and standing  Progression: improved    Treatments  Current treatment: physical therapy  Patient Goals  Patient goals for therapy: decreased pain, improved balance, increased motion, return to work, return to [employer] activities, independence with ADLs/IADLs and increased strength        Objective     Date of onset:  9/10/2019    Date of Surgery:  9/10/2019    History of Present Episode: 9/19/2019  [facility] states his original Right Total Knee Replacement became infected  He just had his old R TKR SX removed and installed a new R TKR SX  Past Medical History:    9/19/2019  [facility] Bowles reports R TKR SX   Neck Sx  Pain still in buttocks  L TKR SX     Previous Level of Functional Ability:  9/19/2019  [facility]jit Wilbur states his right knee became worse and worse and he finally went for a R TKR SX replacement  Inspection / Palpation:  Knee:  9/19/2019  Mesomorphic / Endomorphic body type  No signs of infection  No signs of wounds  Mild signs of drainage  Moderate signs of ecchymotic regions  Moderate signs of erythremic regions  Moderate signs of muscle spasm  Moderate signs of muscle guarding  Moderate signs of tenderness reported to palpation  Moderate signs of swelling  Positive signs of a surgery site  Current conditions appears consistent with recent acute R TKR SX episode  Chief Complaints:  9/19/2019  Bjorn reports moderate to severe difficulty with standing  Ofyashira Bowles reports moderate to severe difficulty with walking  Ofyashira Aas reports moderate to severe difficulty with movement / use of his right knee  Ofyashira Aas reports moderate to severe difficulty with use of stairs  Ofyashira Aas reports severe difficulty with running  Ofyashira Aas reports severe difficulty with jumping  Ofyashira Aas reports moderate to severe difficulty with use of inclines    Ofyashira Aas reports moderate difficulty with sleeping  Alma Dhillon reports moderate to severe difficulty with his strength and endurance  Alma Dihllon reports moderate to severe limitations with his range of motion  Alma Dhillon reports moderate to severe difficulty lying on his right knee region      KNEE PAIN Resting Moving Palpation Standing Walking   9/19/2019 Lt 0 0 0 0 0   9/19/2019 Rt 3-5 5-8 5-8 4-8 4-8   10/22/2019 Rt 1-3 2-5 2-5 2-5 2-5   11/13/2019 Rt 1-3 1-4 1-4 1-4 1-4     KNEE PAIN Running Stairs Sleeping Twisting Jumping   9/19/2019 Lt 0 0 0 0 0   9/19/2019 Rt NA 8-10 4-8 5-9 NA   10/22/2019 Rt NA 3-6 2-5 2-5 NA   11/13/2019 Rt NA 2-5 1-4 1-4 NA     KNEE/ AROM Flexion Extension SLR   9/19/2019 Lt 135° 0° 85°   9/19/2019 Rt 54° 12° 74°   10/22/2019 Rt 105° 4° 81°   11/13/2019 Rt 115° 1° 89°     KNEE MMT Flexion Extension Varus Stress Valgus Stress   9/19/2019 Lt 0/10  45 lbs 0/10  38 lbs 0/10  37 lbs 0/10  36 lbs   9/19/2019 Rt 7/10  6 lbs 7/10  3 lbs 7/10  5 lbs 7/10  4 lbs   10/22/2019 Rt 3/10  17 lbs 3/10  15 lbs 3/10  17 lbs 3/10  16 lbs   11/13/2019 Rt 02/10  21 lbs 2/10  18 lbs 2/10  21 lbs 2/10  20 lbs     Precautions:  R TKR SX - Replacement Repair / Replace

## 2019-11-25 NOTE — PROGRESS NOTES
Today's date: 2019  Patient name: Bridget Quintero  : 1950  MRN: [id number]  Referring provider: Anjana Rivera MD  Dx:   Encounter Diagnosis     ICD-10-CM    1  Aftercare following right knee joint replacement surgery Z47 1     Z96 651    2  Difficulty walking R26 2    3  Acute pain of right knee M25 561      Subjective:  Kalpana Schulte states his right knee is feeling better  He can stand longer and walk further  Objective: See treatment log below    Assessment: Tolerated treatment well  Patient exhibited good technique with therapeutic exercises and would benefit from continued PT    Plan: Continue per plan of care  Progress treatment as tolerated         Precautions:  R TKR SX - Replacement Repair / Replace    Daily Treatment Log  Manual      MT, ROM 15' 15' 15' 15'    HEP        Exercise Log     Balance Board 30x ea  30x 30x    Chair Squats        P-Bar-GT-Forward, Backward,Side-Even & Dips 10x  10x 10x    BOSU-Walk 5'  5' 5'    Foam Pad SLR,Hip/KneeFl,Step Ups  (doubled) 30x  30x 30x    Foam Beam 30x  30x 30x    T/G-Squats PF 30x ea 30x ea 30x 30x    W/P-Hip-Abd,Add,Flex,Ext 27 5#   30x ea 27 5#   30x ea 27 5#-30x 27 5#-30x    WP-Squats 27 5#   30x ea 27 5#   30x ea 27 5#-30x 27 5#-30x    Trimax-TKE        Mdemleh-PX-Aq 2x2' 2x2' 2x2' 2x2'    NK Table Exer 7 5# 30x ea  15#-30x 15#-30x    NK Table ROM 2x10' 2x10' 2x10' 2x10'    TM        Stepper 1-1 10'  10' 10'    Bike 10' 10' 10' 10'    ME, PE 13' 15' 15' 15'            Modalities     MH&ES NT NT      US NT NT

## 2019-11-25 NOTE — PROGRESS NOTES
PT Re-Evaluation   Today's date: 10/9/2019    Patient name: Marcela Bryson  : 1950  MRN: [id number]  Referring provider: Michael Lowe MD  Dx:   Encounter Diagnosis     ICD-10-CM    1  Aftercare following right knee joint replacement surgery Z47 1     Z96 651    2  Difficulty walking R26 2    3  Acute pain of right knee M25 561      Assessment  Assessment details: Patient is slowly feeling better  Impairments: abnormal gait, abnormal or restricted ROM, abnormal movement, activity intolerance, impaired balance, pain with function and safety issue  Understanding of Dx/Px/POC: excellent  Goals  STG 2-4 weeks:    Increase B LE strength 2-5 lbs  Decrease pain by 1-2 levels on 1-10 pain scale  Increase standing/walking tolerance to >30 minutes  Patient independent with HEP  LTG 6-8 weeks:   Increase B LE strength 10-20 lbs  Decrease pain to 0-2/10 with activity  Increase single leg stance >30 seconds  Increase standing/walking tolerance to >90 minutes  Increase range of motion to normal   Improve gait pattern, coordination and balance to normal   D/C with HEP  Plan  Plan details: All planned modality interventions and planned therapy interventions are provided PRN      Patient would benefit from: PT eval and skilled physical therapy  Planned modality interventions: unattended electrical stimulation  Planned therapy interventions: joint mobilization, manual therapy, balance, balance/weight bearing training, muscle pump exercises, neuromuscular re-education, patient education, postural training, self care, strengthening, stretching, therapeutic activities, therapeutic exercise, therapeutic training, transfer training, home exercise program, graded exercise, gait training, flexibility and coordination  Frequency: 3x week  Duration in weeks: 6  Treatment plan discussed with: patient      Subjective Evaluation    Pain  Quality: discomfort, knife-like, pulling, squeezing, sharp, radiating and tight  Aggravating factors: stair climbing, walking, standing, running and lifting  Progression: improved    Treatments  Previous treatment: physical therapy  Current treatment: physical therapy  Patient Goals  Patient goals for therapy: decreased pain, improved balance, increased motion, return to work, return to [employer] activities, independence with ADLs/IADLs and increased strength        Objective     Date of onset:  9/10/2019    Date of Surgery:  9/10/2019    History of Present Episode: 9/19/2019  Alcon Blank states his original Right Total Knee Replacement became infected  He just had his old R TKR SX removed and installed a new R TKR SX  Past Medical History:    9/19/2019  Alcon Blank reports R TKR SX   Neck Sx  Pain still in buttocks  L TKR SX     Previous Level of Functional Ability:  9/19/2019  Alconwendi Blank states his right knee became worse and worse and he finally went for a R TKR SX replacement  Inspection / Palpation:  Knee:  9/19/2019  Mesomorphic / Endomorphic body type  No signs of infection  No signs of wounds  Mild signs of drainage  Moderate signs of ecchymotic regions  Moderate signs of erythremic regions  Moderate signs of muscle spasm  Moderate signs of muscle guarding  Moderate signs of tenderness reported to palpation  Moderate signs of swelling  Positive signs of a surgery site  Current conditions appears consistent with recent acute R TKR SX episode  Chief Complaints:  9/19/2019  Bjorn reports moderate to severe difficulty with standing  Alcon Fatoumatas reports moderate to severe difficulty with walking  Alcon Fatoumatas reports moderate to severe difficulty with movement / use of his right knee  Alcon Saas reports moderate to severe difficulty with use of stairs  Alcon Saas reports severe difficulty with running  Alcon Saas reports severe difficulty with jumping  Alcon Saas reports moderate to severe difficulty with use of inclines  Alcon Saas reports moderate difficulty with sleeping  Sher Grandchild reports moderate to severe difficulty with his strength and endurance  Sher Grandchild reports moderate to severe limitations with his range of motion  Sher Grandchild reports moderate to severe difficulty lying on his right knee region      KNEE PAIN Resting Moving Palpation Standing Walking   9/19/2019 Lt 0 0 0 0 0   9/19/2019 Rt 3-5 5-8 5-8 4-8 4-8     KNEE PAIN Running Stairs Sleeping Twisting Jumping   9/19/2019 Lt 0 0 0 0 0   9/19/2019 Rt NA 8-10 4-8 5-9 NA     KNEE AROM Flexion Extension SLR   9/19/2019 Lt 135° 0° 85°   9/19/2019 Rt 54° 12° 74°     KNEE MMT Flexion Extension Varus Stress Valgus Stress   9/19/2019 Lt 0/10  45 lbs 0/10  38 lbs 0/10  37 lbs 0/10  36 lbs   9/19/2019 Rt 7/10  6 lbs 7/10  3 lbs 7/10  5 lbs 7/10  4 lbs     Precautions:  R TKR SX - Replacement Repair / Replace

## 2019-11-27 ENCOUNTER — OFFICE VISIT (OUTPATIENT)
Dept: PHYSICAL THERAPY | Facility: CLINIC | Age: 69
End: 2019-11-27
Payer: MEDICARE

## 2019-11-27 DIAGNOSIS — M25.561 ACUTE PAIN OF RIGHT KNEE: ICD-10-CM

## 2019-11-27 DIAGNOSIS — Z96.651 AFTERCARE FOLLOWING RIGHT KNEE JOINT REPLACEMENT SURGERY: Primary | ICD-10-CM

## 2019-11-27 DIAGNOSIS — R26.2 DIFFICULTY WALKING: ICD-10-CM

## 2019-11-27 DIAGNOSIS — Z47.1 AFTERCARE FOLLOWING RIGHT KNEE JOINT REPLACEMENT SURGERY: Primary | ICD-10-CM

## 2019-11-27 PROCEDURE — 97112 NEUROMUSCULAR REEDUCATION: CPT | Performed by: PHYSICAL THERAPIST

## 2019-11-27 PROCEDURE — 97110 THERAPEUTIC EXERCISES: CPT | Performed by: PHYSICAL THERAPIST

## 2019-11-27 PROCEDURE — 97140 MANUAL THERAPY 1/> REGIONS: CPT | Performed by: PHYSICAL THERAPIST

## 2019-11-27 NOTE — PROGRESS NOTES
Today's date: 2019  Patient name: Jennifer Baca  : 1950  MRN: [id number]  Referring provider: Faith Porras MD  Dx:   Encounter Diagnosis     ICD-10-CM    1  Aftercare following right knee joint replacement surgery Z47 1     Z96 651    2  Difficulty walking R26 2    3  Acute pain of right knee M25 561      Subjective:  Alma Dhillon states his knee is slowly feeling better  He still has some pain but he can stand longer and walk further  He can perform chores better and longer  Objective: See treatment log below    Assessment: Tolerated treatment well  Patient exhibited good technique with therapeutic exercises and would benefit from continued PT    Plan: Continue per plan of care  Progress treatment as tolerated         Precautions:  R TKR SX - Replacement Repair / Replace    Daily Treatment Log  Manual     MT, ROM 15' 15' 15' 15' 15'   HEP        Exercise Log    Balance Board 30x ea  30x 30x 30x   Chair Squats        P-Bar-GT-Forward, Backward,Side-Even & Dips 10x  10x 10x 10x   BOSU-Walk 5'  5' 5' 5'   Foam Pad SLR,Hip/KneeFl,Step Ups  (doubled) 30x  30x 30x 30x   Foam Beam 30x  30x 30x 30x   T/G-Squats PF 30x ea 30x ea 30x 30x 30x   W/P-Hip-Abd,Add,Flex,Ext 27 5#   30x ea 27 5#   30x ea 27 5#-30x 27 5#-30x 27 5#-30x   WP-Squats 27 5#   30x ea 27 5#   30x ea 27 5#-30x 27 5#-30x 27 5#-30x   Trimax-TKE        Rgwplnn-AY-Iz 2x2' 2x2' 2x2' 2x2' 2x2'   NK Table Exer 7 5# 30x ea  15#-30x 15#-30x 15#-30x   NK Table ROM 2x10' 2x10' 2x10' 2x10' 2x10'   TM        Stepper 1-1 10'  10' 10' 10'   Bike 10' 10' 10' 10' 10'   ME, PE 13' 15' 15' 15' 15'           Modalities    MH&ES NT NT      US NT NT

## 2019-11-29 ENCOUNTER — OFFICE VISIT (OUTPATIENT)
Dept: PHYSICAL THERAPY | Facility: CLINIC | Age: 69
End: 2019-11-29
Payer: MEDICARE

## 2019-11-29 DIAGNOSIS — M25.561 ACUTE PAIN OF RIGHT KNEE: ICD-10-CM

## 2019-11-29 DIAGNOSIS — Z47.1 AFTERCARE FOLLOWING RIGHT KNEE JOINT REPLACEMENT SURGERY: Primary | ICD-10-CM

## 2019-11-29 DIAGNOSIS — Z96.651 AFTERCARE FOLLOWING RIGHT KNEE JOINT REPLACEMENT SURGERY: Primary | ICD-10-CM

## 2019-11-29 DIAGNOSIS — R26.2 DIFFICULTY WALKING: ICD-10-CM

## 2019-11-29 PROCEDURE — 97140 MANUAL THERAPY 1/> REGIONS: CPT

## 2019-11-29 PROCEDURE — 97110 THERAPEUTIC EXERCISES: CPT

## 2019-11-29 NOTE — PROGRESS NOTES
Today's date: 2019  Patient name: Oliver Barrios  : 1950  MRN: [id number]  Referring provider: Rubén Garsia MD  Dx:   Encounter Diagnosis     ICD-10-CM    1  Aftercare following right knee joint replacement surgery Z47 1     Z96 651    2  Difficulty walking R26 2    3  Acute pain of right knee M25 561      Subjective:  No new c/o pain today  Objective: See treatment log below    Assessment: Tolerated treatment well  Patient exhibited good technique with therapeutic exercises and would benefit from continued PT to increase R knee ROM/strength and endurance to improve mobility and gait  Plan: Continue per plan of care  Progress treatment as tolerated         Precautions:  R TKR SX - Replacement Repair / Replace    Daily Treatment Log  Manual     MT, ROM 30'    15'   HEP        Exercise Log    Balance Board     30x   Chair Squats        P-Bar-GT-Forward, Backward,Side-Even & Dips     10x   BOSU-Walk     5'   Foam Pad SLR,Hip/KneeFl,Step Ups  (doubled)     30x   Foam Beam     30x   T/G-Squats PF     30x   W/P-Hip-Abd,Add,Flex,Ext       27 5#-30x   WP-Squats       27 5#-30x   Trimax-TKE        Ofytqrh-ND-Gg 2x2'    2x2'   NK Table Exer     15#-30x   NK Table ROM     2x10'   TM        Stepper 1-1     10'   Bike 10'    10'   ME, PE 15'    15'           Modalities    MH&ES NT       US NT

## 2019-12-02 ENCOUNTER — OFFICE VISIT (OUTPATIENT)
Dept: PHYSICAL THERAPY | Facility: CLINIC | Age: 69
End: 2019-12-02
Payer: MEDICARE

## 2019-12-02 DIAGNOSIS — Z47.1 AFTERCARE FOLLOWING RIGHT KNEE JOINT REPLACEMENT SURGERY: Primary | ICD-10-CM

## 2019-12-02 DIAGNOSIS — M25.561 ACUTE PAIN OF RIGHT KNEE: ICD-10-CM

## 2019-12-02 DIAGNOSIS — Z96.651 AFTERCARE FOLLOWING RIGHT KNEE JOINT REPLACEMENT SURGERY: Primary | ICD-10-CM

## 2019-12-02 DIAGNOSIS — R26.2 DIFFICULTY WALKING: ICD-10-CM

## 2019-12-02 PROCEDURE — 97112 NEUROMUSCULAR REEDUCATION: CPT | Performed by: PHYSICAL THERAPIST

## 2019-12-02 PROCEDURE — 97110 THERAPEUTIC EXERCISES: CPT | Performed by: PHYSICAL THERAPIST

## 2019-12-02 PROCEDURE — 97140 MANUAL THERAPY 1/> REGIONS: CPT | Performed by: PHYSICAL THERAPIST

## 2019-12-02 PROCEDURE — 97116 GAIT TRAINING THERAPY: CPT | Performed by: PHYSICAL THERAPIST

## 2019-12-02 NOTE — PROGRESS NOTES
Today's date: 2019  Patient name: Prabha Davila  : 1950  MRN: [id number]  Referring provider: Anuja Upton MD  Dx:   Encounter Diagnosis     ICD-10-CM    1  Aftercare following right knee joint replacement surgery Z47 1     Z96 651    2  Difficulty walking R26 2    3  Acute pain of right knee M25 561      Subjective:  Winter Muniz states his right knee is getting better  Objective: See treatment log below    Assessment: Tolerated treatment well  Patient exhibited good technique with therapeutic exercises and would benefit from continued PT    Plan: Continue per plan of care  Progress treatment as tolerated         Precautions:  R TKR SX - Replacement Repair / Replace    Daily Treatment Log  Manual        MT, ROM 30' 15'      HEP        Exercise Log       Balance Board  30x      Chair Squats        P-Bar-GT-Forward, Backward,Side-Even & Dips  10x      BOSU-Walk  5'      Foam Pad SLR,Hip/KneeFl,Step Ups  (doubled)  30x      Foam Beam  30x      T/G-Squats PF  30x      W/P-Hip-Abd,Add,Flex,Ext    27 5#-30x      WP-Squats    27 5#-30x      Trimax-TKE        Qnnxvag-AI-Vz 2x2' 2x2'      NK Table Exer  15#-30x      NK Table ROM  2x10'      TM        Stepper 1-1  10'      Bike 10' 10'      ME, PE 15' 15'              Modalities       MH&ES NT       US NT

## 2019-12-04 ENCOUNTER — OFFICE VISIT (OUTPATIENT)
Dept: PHYSICAL THERAPY | Facility: CLINIC | Age: 69
End: 2019-12-04
Payer: MEDICARE

## 2019-12-04 DIAGNOSIS — Z96.651 AFTERCARE FOLLOWING RIGHT KNEE JOINT REPLACEMENT SURGERY: Primary | ICD-10-CM

## 2019-12-04 DIAGNOSIS — R26.2 DIFFICULTY WALKING: ICD-10-CM

## 2019-12-04 DIAGNOSIS — M25.561 ACUTE PAIN OF RIGHT KNEE: ICD-10-CM

## 2019-12-04 DIAGNOSIS — Z47.1 AFTERCARE FOLLOWING RIGHT KNEE JOINT REPLACEMENT SURGERY: Primary | ICD-10-CM

## 2019-12-04 PROCEDURE — 97110 THERAPEUTIC EXERCISES: CPT | Performed by: PHYSICAL THERAPIST

## 2019-12-04 PROCEDURE — 97116 GAIT TRAINING THERAPY: CPT | Performed by: PHYSICAL THERAPIST

## 2019-12-04 PROCEDURE — 97140 MANUAL THERAPY 1/> REGIONS: CPT | Performed by: PHYSICAL THERAPIST

## 2019-12-04 PROCEDURE — 97112 NEUROMUSCULAR REEDUCATION: CPT | Performed by: PHYSICAL THERAPIST

## 2019-12-06 ENCOUNTER — OFFICE VISIT (OUTPATIENT)
Dept: PHYSICAL THERAPY | Facility: CLINIC | Age: 69
End: 2019-12-06
Payer: MEDICARE

## 2019-12-06 DIAGNOSIS — R26.2 DIFFICULTY WALKING: ICD-10-CM

## 2019-12-06 DIAGNOSIS — Z96.651 AFTERCARE FOLLOWING RIGHT KNEE JOINT REPLACEMENT SURGERY: Primary | ICD-10-CM

## 2019-12-06 DIAGNOSIS — Z47.1 AFTERCARE FOLLOWING RIGHT KNEE JOINT REPLACEMENT SURGERY: Primary | ICD-10-CM

## 2019-12-06 DIAGNOSIS — M25.561 ACUTE PAIN OF RIGHT KNEE: ICD-10-CM

## 2019-12-06 PROCEDURE — 97116 GAIT TRAINING THERAPY: CPT | Performed by: PHYSICAL THERAPIST

## 2019-12-06 PROCEDURE — 97112 NEUROMUSCULAR REEDUCATION: CPT | Performed by: PHYSICAL THERAPIST

## 2019-12-06 PROCEDURE — 97110 THERAPEUTIC EXERCISES: CPT | Performed by: PHYSICAL THERAPIST

## 2019-12-06 PROCEDURE — 97140 MANUAL THERAPY 1/> REGIONS: CPT | Performed by: PHYSICAL THERAPIST

## 2019-12-06 NOTE — PROGRESS NOTES
Today's date: 2019  Patient name: Morales Plata  : 1950  MRN: [id number]  Referring provider: Braxton Castro MD  Dx:   Encounter Diagnosis     ICD-10-CM    1  Aftercare following right knee joint replacement surgery Z47 1     Z96 651    2  Difficulty walking R26 2    3  Acute pain of right knee M25 561      Subjective:  Julious Setting states his knee is feeling better and he can almost go up and down stairs with his right leg  He still will fall if he just tries to use his right leg however  Objective: See treatment log below    Assessment: Tolerated treatment well  Patient exhibited good technique with therapeutic exercises and would benefit from continued PT    Plan: Continue per plan of care  Progress treatment as tolerated         Precautions:  R TKR SX - Replacement Repair / Replace    Daily Treatment Log  Manual      MT, ROM 30' 15' 15' 15'    HEP        Exercise Log     Balance Board  30x 30x 30x    Chair Squats        P-Bar-GT-Forward, Backward,Side-Even & Dips  10x 10x 10x    BOSU-Walk  5' 5' 5'    Foam Pad SLR,Hip/KneeFl,Step Ups  (doubled)  30x 30x 30x    Foam Beam  30x 30x 30x    T/G-Squats PF  30x 30x 30x    W/P-Hip-Abd,Add,Flex,Ext    27 5#-30x 27 5#-30x 27 5#-30x    WP-Squats    27 5#-30x 27 5#-30x 27 5#-30x    Trimax-TKE        Ndfbzvj-LP-Xm 2x2' 2x2' 2x2' 2x2'    NK Table Exer  15#-30x 15#-30x 15#-30x    NK Table ROM  2x10' 2x10' 2x10'    TM        Stepper 1-1  10' 10' 10'    Bike 10' 10' 10' 10'    ME, PE 13' 15' 15' 15'            Modalities     MH&ES NT       US NT

## 2019-12-09 ENCOUNTER — OFFICE VISIT (OUTPATIENT)
Dept: PHYSICAL THERAPY | Facility: CLINIC | Age: 69
End: 2019-12-09
Payer: MEDICARE

## 2019-12-09 DIAGNOSIS — R26.2 DIFFICULTY WALKING: ICD-10-CM

## 2019-12-09 DIAGNOSIS — Z96.651 AFTERCARE FOLLOWING RIGHT KNEE JOINT REPLACEMENT SURGERY: Primary | ICD-10-CM

## 2019-12-09 DIAGNOSIS — M25.561 ACUTE PAIN OF RIGHT KNEE: ICD-10-CM

## 2019-12-09 DIAGNOSIS — Z47.1 AFTERCARE FOLLOWING RIGHT KNEE JOINT REPLACEMENT SURGERY: Primary | ICD-10-CM

## 2019-12-09 PROCEDURE — 97110 THERAPEUTIC EXERCISES: CPT

## 2019-12-09 PROCEDURE — 97140 MANUAL THERAPY 1/> REGIONS: CPT

## 2019-12-09 NOTE — PROGRESS NOTES
Today's date: 2019  Patient name: Dulce Thomason  : 1950  MRN: [id number]  Referring provider: Delfina Smith MD  Dx:   Encounter Diagnosis     ICD-10-CM    1  Aftercare following right knee joint replacement surgery Z47 1     Z96 651    2  Difficulty walking R26 2    3  Acute pain of right knee M25 561      Subjective:  No new c/o pain today  Objective: See treatment log below    Assessment: Tolerated treatment well  Patient exhibited good technique with therapeutic exercises and would benefit from continued PT to increase R knee ROM/strength and endurance to improve mobility  Plan: Continue per plan of care  Progress treatment as tolerated         Precautions:  R TKR SX - Replacement Repair / Replace    Daily Treatment Log  Manual     MT, ROM 30' 15' 15' 15' 20'   HEP        Exercise Log    Balance Board  30x 30x 30x 30x ea   Chair Squats        P-Bar-GT-Forward, Backward,Side-Even & Dips  10x 10x 10x 10x ea   BOSU-Walk  5' 5' 5' 5'   Foam Pad SLR,Hip/KneeFl,Step Ups  (doubled)  30x 30x 30x 30x ea   Foam Beam  30x 30x 30x 10x ea   T/G-Squats PF  30x 30x 30x 30x ea   W/P-Hip-Abd,Add,Flex,Ext    27 5#-30x 27 5#-30x 27 5#-30x 30# 30x ea   WP-Squats    27 5#-30x 27 5#-30x 27 5#-30x 30# 30x ea   Trimax-TKE        Pezmdxq-EO-Zf 2x2' 2x2' 2x2' 2x2' 2x2'   NK Table Exer  15#-30x 15#-30x 15#-30x    NK Table ROM  2x10' 2x10' 2x10' 2x10'   TM        Stepper 1-1  10' 10' 10'    Bike 10' 10' 10' 10' 10'   ME, PE 13' 15' 15' 15' 15'           Modalities    MH&ES NT    NT   US NT    NT

## 2019-12-11 ENCOUNTER — OFFICE VISIT (OUTPATIENT)
Dept: PHYSICAL THERAPY | Facility: CLINIC | Age: 69
End: 2019-12-11
Payer: MEDICARE

## 2019-12-11 DIAGNOSIS — R26.2 DIFFICULTY WALKING: ICD-10-CM

## 2019-12-11 DIAGNOSIS — Z96.651 AFTERCARE FOLLOWING RIGHT KNEE JOINT REPLACEMENT SURGERY: Primary | ICD-10-CM

## 2019-12-11 DIAGNOSIS — Z47.1 AFTERCARE FOLLOWING RIGHT KNEE JOINT REPLACEMENT SURGERY: Primary | ICD-10-CM

## 2019-12-11 DIAGNOSIS — M25.561 ACUTE PAIN OF RIGHT KNEE: ICD-10-CM

## 2019-12-11 PROCEDURE — 97110 THERAPEUTIC EXERCISES: CPT

## 2019-12-11 PROCEDURE — 97140 MANUAL THERAPY 1/> REGIONS: CPT

## 2019-12-11 NOTE — PROGRESS NOTES
Today's date: 2019  Patient name: Lindsey Amaral  : 1950  MRN: [id number]  Referring provider: Shonna Barney MD  Dx:   Encounter Diagnosis     ICD-10-CM    1  Aftercare following right knee joint replacement surgery Z47 1     Z96 651    2  Difficulty walking R26 2    3  Acute pain of right knee M25 561      Subjective:  No new c/o pain today  "I was on my feet a lot yesterday; surprisingly my R knee didn't do too bad "    Objective: See treatment log below    Assessment: Tolerated treatment well  Patient exhibited good technique with therapeutic exercises and would benefit from continued PT to increase R knee ROM/strength and endurance to improve mobility and gait  Plan: Continue per plan of care  Progress treatment as tolerated         Precautions:  R TKR SX - Replacement Repair / Replace    Daily Treatment Log  Manual     MT, ROM 20'    20'   HEP        Exercise Log    Balance Board 30x ea    30x ea   Chair Squats        P-Bar-GT-Forward, Backward,Side-Even & Dips     10x ea   BOSU-Walk 5'    5'   Foam Pad SLR,Hip/KneeFl,Step Ups  (doubled)     30x ea   Foam Beam     10x ea   T/G-Squats PF 30x ea    30x ea   W/P-Hip-Abd,Add,Flex,Ext       30# 30x ea   WP-Squats       30# 30x ea   Trimax-TKE        Pjqnpoq-HV-Bf 2x2'    2x2'   NK Table Exer        NK Table ROM 2x10'    2x10'   TM        Stepper 1-1        Bike 10'    10'   ME, PE 15'    15'           Modalities    MH&ES NT    NT   US NT    NT

## 2019-12-13 ENCOUNTER — OFFICE VISIT (OUTPATIENT)
Dept: PHYSICAL THERAPY | Facility: CLINIC | Age: 69
End: 2019-12-13
Payer: MEDICARE

## 2019-12-13 DIAGNOSIS — Z47.1 AFTERCARE FOLLOWING RIGHT KNEE JOINT REPLACEMENT SURGERY: Primary | ICD-10-CM

## 2019-12-13 DIAGNOSIS — M25.561 ACUTE PAIN OF RIGHT KNEE: ICD-10-CM

## 2019-12-13 DIAGNOSIS — R26.2 DIFFICULTY WALKING: ICD-10-CM

## 2019-12-13 DIAGNOSIS — Z96.651 AFTERCARE FOLLOWING RIGHT KNEE JOINT REPLACEMENT SURGERY: Primary | ICD-10-CM

## 2019-12-13 PROCEDURE — 97140 MANUAL THERAPY 1/> REGIONS: CPT

## 2019-12-13 PROCEDURE — 97110 THERAPEUTIC EXERCISES: CPT

## 2019-12-13 NOTE — PROGRESS NOTES
Today's date: 2019  Patient name: Lisandra Correa  : 1950  MRN: [id number]  Referring provider: Geoffrey King MD  Dx:   Encounter Diagnosis     ICD-10-CM    1  Aftercare following right knee joint replacement surgery Z47 1     Z96 651    2  Difficulty walking R26 2    3  Acute pain of right knee M25 561      Subjective:  No new c/o pain today  Objective: See treatment log below    Assessment: Tolerated treatment well  Patient exhibited good technique with therapeutic exercises and would benefit from continued PT to increase R knee ROM/strength and endurance to improve mobility and gait  Plan: Continue per plan of care  Progress treatment as tolerated         Precautions:  R TKR SX - Replacement Repair / Replace    Daily Treatment Log  Manual        MT, ROM 20' 15'      HEP        Exercise Log       Balance Board 30x ea 30x ea      Chair Squats        P-Bar-GT-Forward, Backward,Side-Even & Dips        BOSU-Walk 5' 5'      Foam Pad SLR,Hip/KneeFl,Step Ups  (doubled)        Foam Beam        T/G-Squats PF 30x ea 30x ea      W/P-Hip-Abd,Add,Flex,Ext          WP-Squats          Trimax-TKE        Oenvkpi-DU-Wa 2x2'       NK Table Exer  15# Ext  10# Flex  30x ea      NK Table ROM 2x10' 2x10'      TM        Stepper 1-1        Bike L10 10' 10'      ME, PE 15' 15'              Modalities       MH&ES NT NT      US NT NT

## 2019-12-18 ENCOUNTER — OFFICE VISIT (OUTPATIENT)
Dept: PHYSICAL THERAPY | Facility: CLINIC | Age: 69
End: 2019-12-18
Payer: MEDICARE

## 2019-12-18 DIAGNOSIS — Z96.651 AFTERCARE FOLLOWING RIGHT KNEE JOINT REPLACEMENT SURGERY: Primary | ICD-10-CM

## 2019-12-18 DIAGNOSIS — R26.2 DIFFICULTY WALKING: ICD-10-CM

## 2019-12-18 DIAGNOSIS — M25.561 ACUTE PAIN OF RIGHT KNEE: ICD-10-CM

## 2019-12-18 DIAGNOSIS — Z47.1 AFTERCARE FOLLOWING RIGHT KNEE JOINT REPLACEMENT SURGERY: Primary | ICD-10-CM

## 2019-12-18 PROCEDURE — 97110 THERAPEUTIC EXERCISES: CPT

## 2019-12-18 PROCEDURE — 97140 MANUAL THERAPY 1/> REGIONS: CPT

## 2019-12-18 NOTE — PROGRESS NOTES
Today's date: 2019  Patient name: Morales Plata  : 1950  MRN: [id number]  Referring provider: Braxton Castro MD  Dx:   Encounter Diagnosis     ICD-10-CM    1  Aftercare following right knee joint replacement surgery Z47 1     Z96 651    2  Difficulty walking R26 2    3  Acute pain of right knee M25 561      Subjective:  No new c/o pain today  "I was able to go for a little over a mile and ten walk, with hills, and my R knee did well "     Objective: See treatment log below    Assessment: Tolerated treatment well  Patient exhibited good technique with therapeutic exercises and would benefit from continued PT to increase R knee ROM/strength and endurance to improve mobility and gait  Plan: Continue per plan of care  Progress treatment as tolerated  Precautions:  R TKR SX - Replacement Repair / Replace    Daily Treatment Log  Manual       MT, ROM 20' 15' 20'     HEP        Exercise Log      Balance Board 30x ea 30x ea      Chair Squats        P-Bar-GT-Forward, Backward,Side-Even & Dips        BOSU-Walk 5' 5'      Foam Pad SLR,Hip/KneeFl,Step Ups  (doubled)        Foam Beam        T/G-Squats PF 30x ea 30x ea 30x ea     W/P-Hip-Abd,Add,Flex,Ext     28  75#   30x ea     WP-Squats     28  75#   30x ea     Trimax-TKE        Czwrimx-MD-Ux 2x2'  2x2'     NK Table Exer  15# Ext  10# Flex  30x ea      NK Table ROM 2x10' 2x10' 2x10'     TM        Stepper 1-1        Bike L10 10' 10' 10'     ME, PE 13' 15' 15'             Modalities      MH&ES NT NT NT     US NT NT NT

## 2019-12-20 ENCOUNTER — TRANSCRIBE ORDERS (OUTPATIENT)
Dept: PHYSICAL THERAPY | Facility: CLINIC | Age: 69
End: 2019-12-20

## 2019-12-20 ENCOUNTER — OFFICE VISIT (OUTPATIENT)
Dept: PHYSICAL THERAPY | Facility: CLINIC | Age: 69
End: 2019-12-20
Payer: MEDICARE

## 2019-12-20 DIAGNOSIS — R26.2 DIFFICULTY WALKING: ICD-10-CM

## 2019-12-20 DIAGNOSIS — Z47.1 AFTERCARE FOLLOWING RIGHT KNEE JOINT REPLACEMENT SURGERY: Primary | ICD-10-CM

## 2019-12-20 DIAGNOSIS — M25.561 ACUTE PAIN OF RIGHT KNEE: ICD-10-CM

## 2019-12-20 DIAGNOSIS — Z96.651 AFTERCARE FOLLOWING RIGHT KNEE JOINT REPLACEMENT SURGERY: Primary | ICD-10-CM

## 2019-12-20 PROCEDURE — 97140 MANUAL THERAPY 1/> REGIONS: CPT

## 2019-12-20 PROCEDURE — 97110 THERAPEUTIC EXERCISES: CPT

## 2019-12-20 NOTE — PROGRESS NOTES
Today's date: 2019  Patient name: Vinny Gould  : 1950  MRN: [id number]  Referring provider: Rochelle Lentz MD  Dx:   Encounter Diagnosis     ICD-10-CM    1  Aftercare following right knee joint replacement surgery Z47 1     Z96 651    2  Difficulty walking R26 2    3  Acute pain of right knee M25 561      Subjective:  No new c/o pain today  Objective: See treatment log below    Assessment: Tolerated treatment well  Patient exhibited good technique with therapeutic exercises and would benefit from continued PT to increase R knee ROM/strength and endurance to improve mobility and gait  Plan: Continue per plan of care  Progress treatment as tolerated  Precautions:  R TKR SX - Replacement Repair / Replace    Daily Treatment Log  Manual      MT, ROM 20' 15' 20' 20'    HEP        Exercise Log     Balance Board 30x ea 30x ea  30x ea    Chair Squats        P-Bar-GT-Forward, Backward,Side-Even & Dips        BOSU-Walk 5' 5'  5'    Foam Pad SLR,Hip/KneeFl,Step Ups  (doubled)        Foam Beam        T/G-Squats PF 30x ea 30x ea 30x ea 30x ea    W/P-Hip-Abd,Add,Flex,Ext     28  75#   30x ea 28  75#  30x ea    WP-Squats     28  75#   30x ea 28  75#   30x ea    Trimax-TKE        Xgrtnnh-OL-Gz 2x2'  2x2' 2x2'    NK Table Exer  15# Ext  10# Flex  30x ea  15# 30x ea    NK Table ROM 2x10' 2x10' 2x10' 2x10'    TM        Stepper 1-1        Bike L10 10' 10' 10' 10'    ME, PE 13' 15' 15' 15'            Modalities     MH&ES NT NT NT NT    US NT NT NT NT

## 2019-12-24 ENCOUNTER — OFFICE VISIT (OUTPATIENT)
Dept: PHYSICAL THERAPY | Facility: CLINIC | Age: 69
End: 2019-12-24
Payer: MEDICARE

## 2019-12-24 DIAGNOSIS — Z47.1 AFTERCARE FOLLOWING RIGHT KNEE JOINT REPLACEMENT SURGERY: Primary | ICD-10-CM

## 2019-12-24 DIAGNOSIS — Z96.651 AFTERCARE FOLLOWING RIGHT KNEE JOINT REPLACEMENT SURGERY: Primary | ICD-10-CM

## 2019-12-24 DIAGNOSIS — R26.2 DIFFICULTY WALKING: ICD-10-CM

## 2019-12-24 DIAGNOSIS — M25.561 ACUTE PAIN OF RIGHT KNEE: ICD-10-CM

## 2019-12-24 PROCEDURE — 97140 MANUAL THERAPY 1/> REGIONS: CPT

## 2019-12-24 PROCEDURE — 97110 THERAPEUTIC EXERCISES: CPT

## 2019-12-24 NOTE — PROGRESS NOTES
Today's date: 2019  Patient name: Oliver Barrios  : 1950  MRN: [id number]  Referring provider: Rubén Garsia MD  Dx:   Encounter Diagnosis     ICD-10-CM    1  Aftercare following right knee joint replacement surgery Z47 1     Z96 651    2  Difficulty walking R26 2    3  Acute pain of right knee M25 561      Subjective:  "I'm going to take it a little easy today; I had a tooth taken out yesterday "    Objective: See treatment log below    Assessment: Tolerated treatment well  Patient exhibited good technique with therapeutic exercises and would benefit from continued PT to increase R knee ROM/strength and endurance to improve mobility and gait  Plan: Continue per plan of care  Progress treatment as tolerated  Precautions:  R TKR SX - Replacement Repair / Replace    Daily Treatment Log  Manual     MT, ROM 20' 15' 20' 20' 20'   HEP        Exercise Log    Balance Board 30x ea 30x ea  30x ea    Chair Squats        P-Bar-GT-Forward, Backward,Side-Even & Dips        BOSU-Walk 5' 5'  5'    Foam Pad SLR,Hip/KneeFl,Step Ups  (doubled)        Foam Beam        T/G-Squats PF 30x ea 30x ea 30x ea 30x ea 30x ea   W/P-Hip-Abd,Add,Flex,Ext     28  75#   30x ea 28  75#  30x ea 28  75#   30x ea   WP-Squats     28  75#   30x ea 28  75#   30x ea 28  75#  30x ea   Trimax-TKE        Ucyshdk-UX-Jt 2x2'  2x2' 2x2' 2x2'   NK Table Exer  15# Ext  10# Flex  30x ea  15# 30x ea 15# 30x ea   NK Table ROM 2x10' 2x10' 2x10' 2x10' 2x10'   TM        Stepper 1-1        Bike L10 10' 10' 10' 10' L5 10'   ME, PE 13' 15' 15' 15' 15'           Modalities    MH&ES NT NT NT NT NT   US NT NT NT NT NT

## 2019-12-26 ENCOUNTER — APPOINTMENT (OUTPATIENT)
Dept: PHYSICAL THERAPY | Facility: CLINIC | Age: 69
End: 2019-12-26
Payer: MEDICARE

## 2019-12-30 ENCOUNTER — OFFICE VISIT (OUTPATIENT)
Dept: PHYSICAL THERAPY | Facility: CLINIC | Age: 69
End: 2019-12-30
Payer: MEDICARE

## 2019-12-30 DIAGNOSIS — R26.2 DIFFICULTY WALKING: ICD-10-CM

## 2019-12-30 DIAGNOSIS — Z96.651 AFTERCARE FOLLOWING RIGHT KNEE JOINT REPLACEMENT SURGERY: Primary | ICD-10-CM

## 2019-12-30 DIAGNOSIS — Z47.1 AFTERCARE FOLLOWING RIGHT KNEE JOINT REPLACEMENT SURGERY: Primary | ICD-10-CM

## 2019-12-30 DIAGNOSIS — M25.561 ACUTE PAIN OF RIGHT KNEE: ICD-10-CM

## 2019-12-30 PROCEDURE — 97140 MANUAL THERAPY 1/> REGIONS: CPT | Performed by: PHYSICAL THERAPIST

## 2019-12-30 PROCEDURE — 97110 THERAPEUTIC EXERCISES: CPT | Performed by: PHYSICAL THERAPIST

## 2019-12-30 PROCEDURE — 97112 NEUROMUSCULAR REEDUCATION: CPT | Performed by: PHYSICAL THERAPIST

## 2019-12-30 NOTE — PROGRESS NOTES
Today's date: 2019  Patient name: Hitesh Jones  : 1950  MRN: [id number]  Referring provider: Calderon Sanchez MD  Dx:   Encounter Diagnosis     ICD-10-CM    1  Aftercare following right knee joint replacement surgery Z47 1     Z96 651    2  Difficulty walking R26 2    3  Acute pain of right knee M25 561      Subjective:  Richard Dominique states his knee is feeling better  Objective: See treatment log below    Assessment: Tolerated treatment well  Patient exhibited good technique with therapeutic exercises and would benefit from continued PT    Plan: Continue per plan of care        Precautions:  R TKR SX - Replacement Repair / Replace    Daily Treatment Log  Manual         MT, ROM 15'       HEP        Exercise Log        Balance Board 30x ea       Chair Squats        P-Bar-GT-Forward, Backward,Side-Even & Dips        BOSU-Walk 5'       Foam Pad SLR,Hip/KneeFl,Step Ups  (doubled)        Foam Beam        T/G-Squats PF 30x ea       W/P-Hip-Abd,Add,Flex,Ext 28 5#-30x       WP-Squats 28 5#-30x       Trimax-TKE        Jcqabqi-HT-Th 2x2'       NK Table Exer 15#-30x       NK Table ROM 2x10'       TM        Stepper 1-1        Bike L10 10'       ME, PE 15'               Modalities        MH&ES NT       US NT

## 2020-01-02 ENCOUNTER — OFFICE VISIT (OUTPATIENT)
Dept: PHYSICAL THERAPY | Facility: CLINIC | Age: 70
End: 2020-01-02
Payer: MEDICARE

## 2020-01-02 DIAGNOSIS — R26.2 DIFFICULTY WALKING: ICD-10-CM

## 2020-01-02 DIAGNOSIS — M25.561 ACUTE PAIN OF RIGHT KNEE: ICD-10-CM

## 2020-01-02 DIAGNOSIS — Z47.1 AFTERCARE FOLLOWING RIGHT KNEE JOINT REPLACEMENT SURGERY: Primary | ICD-10-CM

## 2020-01-02 DIAGNOSIS — Z96.651 AFTERCARE FOLLOWING RIGHT KNEE JOINT REPLACEMENT SURGERY: Primary | ICD-10-CM

## 2020-01-02 PROCEDURE — 97140 MANUAL THERAPY 1/> REGIONS: CPT

## 2020-01-02 PROCEDURE — 97164 PT RE-EVAL EST PLAN CARE: CPT | Performed by: PHYSICAL THERAPIST

## 2020-01-02 PROCEDURE — 97110 THERAPEUTIC EXERCISES: CPT

## 2020-01-02 NOTE — PROGRESS NOTES
Today's date: 2020  Patient name: Mignon Babin  : 1950  MRN: [id number]  Referring provider: Goran Lang MD  Dx:   Encounter Diagnosis     ICD-10-CM    1  Aftercare following right knee joint replacement surgery Z47 1     Z96 651    2  Difficulty walking R26 2    3  Acute pain of right knee M25 561      Subjective:  No new c/o pain today  Objective: See treatment log below    Assessment: Tolerated treatment well  Patient exhibited good technique with therapeutic exercises and would benefit from continued PT to increase R knee ROM/strength and endurance to improve mobility and gait  Plan: Continue per plan of care  Progress treatment as tolerated         Precautions:  R TKR SX - Replacement Repair / Replace    Daily Treatment Log  Manual        MT, ROM 15' 15'      HEP        Exercise Log       Balance Board 30x ea       Chair Squats        P-Bar-GT-Forward, Backward,Side-Even & Dips        BOSU-Walk 5'       Foam Pad SLR,Hip/KneeFl,Step Ups  (doubled)        Foam Beam        T/G-Squats PF 30x ea 30x ea      W/P-Hip-Abd,Add,Flex,Ext 28 5#-30x 28 5# 30x      WP-Squats 28 5#-30x 28 5# 30x      Trimax-TKE        Akorqlr-RA-Dh 2x2' 2x2'      NK Table Exer 15#-30x 15# 30x ea      NK Table ROM 2x10' 2x10'      TM        Stepper 1-1        Bike  10' 10' L7      ME, PE 15' 15'              Modalities       MH&ES NT NT      US NT NT

## 2020-01-02 NOTE — LETTER
2020  Signature Required / [address] / PT Reevaluation  Leopoldo Berger, MD  [id number] [place] [address]    Patient: Juancho Redmond  YOB: 1950   Date of Visit: 2020     Encounter Diagnosis     ICD-10-CM    1  Aftercare following right knee joint replacement surgery Z47 1     Z96 651    2  Difficulty walking R26 2    3  Acute pain of right knee M25 561      Dear Dr Doe Proper:  Thank you for your recent referral of Juancho Redmond    Please review the attached evaluation summary from Bjorn's recent visit  Please verify that you agree with the plan of care by signing the attached order  If you have any questions or concerns, please do not hesitate to call  I sincerely appreciate the opportunity to share in the care of one of your patients and hope to have another opportunity to work with you in the near future  Sincerely,    Daniel Adhikari PT    Referring Provider:      I certify that I have read the below Plan of Care and certify the need for these services furnished under this plan of treatment while under my care  Leopoldo Berger, MD  [address] [place] [address]  [address] [place] [address]  VIA Facsimile: [phone]    Please SIGN ABOVE and return THIS PAGE ONLY to Fax # [phone]        Today's date: 2020  Patient name: Juancho Redmond  : 1950  MRN: [id number]  Referring provider: Simona Muñoz MD  Dx:   Encounter Diagnosis     ICD-10-CM    1  Aftercare following right knee joint replacement surgery Z47 1     Z96 651    2  Difficulty walking R26 2    3  Acute pain of right knee M25 561      Subjective:  No new c/o pain today  Objective: See treatment log below    Assessment: Tolerated treatment well  Patient exhibited good technique with therapeutic exercises and would benefit from continued PT to increase R knee ROM/strength and endurance to improve mobility and gait  Plan: Continue per plan of care    Progress treatment as tolerated  Precautions:  R TKR SX - Replacement Repair / Replace    Daily Treatment Log  Manual        MT, ROM 15' 15'      HEP        Exercise Log       Balance Board 30x ea       Chair Squats        P-Bar-GT-Forward, Backward,Side-Even & Dips        BOSU-Walk 5'       Foam Pad SLR,Hip/KneeFl,Step Ups  (doubled)        Foam Beam        T/G-Squats PF 30x ea 30x ea      W/P-Hip-Abd,Add,Flex,Ext 28 5#-30x 28 5# 30x      WP-Squats 28 5#-30x 28 5# 30x      Trimax-TKE        Rfvwlrm-JK-Tp 2x2' 2x2'      NK Table Exer 15#-30x 15# 30x ea      NK Table ROM 2x10' 2x10'      TM        Stepper 1-1        Bike  10' 10' L7      ME, PE 15' 15'              Modalities       MH&ES NT NT      US NT NT          PT Re-Evaluation   Today's date: 2020    Patient name: Marti Dickey  : 1950  MRN: [id number]  Referring provider: Dagoberto Plummer MD  Dx:   Encounter Diagnosis     ICD-10-CM    1  Aftercare following right knee joint replacement surgery Z47 1     Z96 651    2  Difficulty walking R26 2    3  Acute pain of right knee M25 561      Assessment  Assessment details: Patient has progressed well and is reaching his full rehab potential   He has improved in his range of motion and his functional ability to stand and walk plus use stairs  His chores have improved and he can perform well in most areas  Impairments: abnormal or restricted ROM, abnormal movement, activity intolerance, impaired balance, pain with function, safety issue, weight-bearing intolerance and poor posture   Understanding of Dx/Px/POC: excellent  Goals  STG 2-4 weeks:    Increase B LE strength 2-5 lbs  Decrease pain by 1-2 levels on 1-10 pain scale  Increase standing/walking tolerance to >30 minutes  Patient independent with HEP  LTG 6-8 weeks:   Increase B LE strength 10-20 lbs  Decrease pain to 0-2/10 with activity  Increase single leg stance >30 seconds     Increase standing/walking tolerance to >90 minutes  Increase range of motion to normal   Improve gait pattern, coordination and balance to normal   D/C with HEP  Plan  Plan details: All planned modality interventions and planned therapy interventions are provided PRN  Patient would benefit from: PT eval and skilled physical therapy  Planned modality interventions: unattended electrical stimulation  Planned therapy interventions: manual therapy, joint mobilization, neuromuscular re-education, patient education, postural training, self care, strengthening, stretching, therapeutic activities, therapeutic exercise, therapeutic training, transfer training, balance, balance/weight bearing training, coordination, flexibility, gait training, home exercise program and graded exercise  Frequency: 3x week  Duration in weeks: 6  Treatment plan discussed with: patient      Subjective Evaluation    Pain  Quality: discomfort, knife-like, pulling, squeezing, sharp, radiating and tight  Relieving factors: heat, change in position, relaxation, rest and support  Aggravating factors: lifting, stair climbing, walking, standing and running  Progression: improved    Treatments  Previous treatment: physical therapy  Current treatment: physical therapy  Patient Goals  Patient goals for therapy: decreased pain, improved balance, increased motion, return to work, return to [employer] activities, independence with ADLs/IADLs and increased strength        Objective     Date of onset:  9/10/2019    Date of Surgery:  9/10/2019    History of Present Episode: 9/19/2019  Jed Melendez states his original Right Total Knee Replacement became infected  He just had his old R TKR SX removed and installed a new R TKR SX  Past Medical History:    9/19/2019  Guerreroanson Chazjuan jose reports R TKR SX   Neck Sx  Pain still in buttocks    L TKR SX     Previous Level of Functional Ability:  9/19/2019  Jed Warejuan jose states his right knee became worse and worse and he finally went for a R TKR SX replacement  Inspection / Palpation:  Knee:  9/19/2019  Mesomorphic / Endomorphic body type  No signs of infection  No signs of wounds  Mild signs of drainage  Moderate signs of ecchymotic regions  Moderate signs of erythremic regions  Moderate signs of muscle spasm  Moderate signs of muscle guarding  Moderate signs of tenderness reported to palpation  Moderate signs of swelling  Positive signs of a surgery site  Current conditions appears consistent with recent acute R TKR SX episode  Chief Complaints:  9/19/2019  Bjorn reports moderate to severe difficulty with standing  Elena Kathya reports moderate to severe difficulty with walking  Elena Rasheed reports moderate to severe difficulty with movement / use of his right knee  Elena Rasheed reports moderate to severe difficulty with use of stairs  Elenaedin Rasheed reports severe difficulty with running  Elenaedin Rasheed reports severe difficulty with jumping  Elena Rasheed reports moderate to severe difficulty with use of inclines  Elena Kathya reports moderate difficulty with sleeping  Elena Rasheed reports moderate to severe difficulty with his strength and endurance  Elena Rasheed reports moderate to severe limitations with his range of motion  Elenaedin Rasheed reports moderate to severe difficulty lying on his right knee region      KNEE PAIN Resting Moving Palpation Standing Walking   9/19/2019 Lt 0 0 0 0 0   9/19/2019 Rt 3-5 5-8 5-8 4-8 4-8   10/22/2019 Rt 1-3 2-5 2-5 2-5 2-5   11/13/2019 Rt 1-3 1-4 1-4 1-4 1-4   11/25/2019 Rt 1-3 1-4 1-4 1-4 1-4   01/02/2020 Rt 0-2 0-3 0-3 0-3 0-3     KNEE PAIN Running Stairs Sleeping Twisting Jumping   9/19/2019 Lt 0 0 0 0 0   9/19/2019 Rt NA 8-10 4-8 5-9 NA   10/22/2019 Rt NA 3-6 2-5 2-5 NA   11/13/2019 Rt NA 2-5 1-4 1-4 NA   11/25/2019 Rt NA 1-5 1-4 1-4 NA   01/02/2020 Rt 2-6 0-3 0-3 0-3 3-6     KNEE/ AROM Flexion Extension SLR   9/19/2019 Lt 135° 0° 85°   9/19/2019 Rt 54° 12° 74°   10/22/2019 Rt 105° 4° 81°   11/13/2019 Rt 115° 1° 89°   11/25/2019 Rt 117° 0° 92°   01/02/2020 Rt 125° 0° 95°     KNEE MMT Flexion Extension Varus Stress Valgus Stress   9/19/2019 Lt 0/10  45 lbs 0/10  38 lbs 0/10  37 lbs 0/10  36 lbs   9/19/2019 Rt 7/10  6 lbs 7/10  3 lbs 7/10  5 lbs 7/10  4 lbs   10/22/2019 Rt 3/10  17 lbs 3/10  15 lbs 3/10  17 lbs 3/10  16 lbs   11/13/2019 Rt 2/10  21 lbs 2/10  18 lbs 2/10  21 lbs 2/10  20 lbs   11/25/2019 Rt 2/10  23 lbs 2/10  22 lbs 2/10  22 lbs 2/10  22 lbs   01/02/2020 Rt 1/10  26 lbs 1/10  27 lbs 1/10  25 lbs 1/10  26 lbs     Precautions:  R TKR SX - Replacement Repair / Replace

## 2020-01-06 ENCOUNTER — OFFICE VISIT (OUTPATIENT)
Dept: PHYSICAL THERAPY | Facility: CLINIC | Age: 70
End: 2020-01-06
Payer: MEDICARE

## 2020-01-06 DIAGNOSIS — R26.2 DIFFICULTY WALKING: ICD-10-CM

## 2020-01-06 DIAGNOSIS — Z47.1 AFTERCARE FOLLOWING RIGHT KNEE JOINT REPLACEMENT SURGERY: Primary | ICD-10-CM

## 2020-01-06 DIAGNOSIS — M25.561 ACUTE PAIN OF RIGHT KNEE: ICD-10-CM

## 2020-01-06 DIAGNOSIS — Z96.651 AFTERCARE FOLLOWING RIGHT KNEE JOINT REPLACEMENT SURGERY: Primary | ICD-10-CM

## 2020-01-06 PROCEDURE — 97110 THERAPEUTIC EXERCISES: CPT | Performed by: PHYSICAL THERAPIST

## 2020-01-06 PROCEDURE — 97140 MANUAL THERAPY 1/> REGIONS: CPT | Performed by: PHYSICAL THERAPIST

## 2020-01-06 PROCEDURE — 97112 NEUROMUSCULAR REEDUCATION: CPT | Performed by: PHYSICAL THERAPIST

## 2020-01-06 PROCEDURE — 97116 GAIT TRAINING THERAPY: CPT | Performed by: PHYSICAL THERAPIST

## 2020-01-06 NOTE — PROGRESS NOTES
Today's date: 2020  Patient name: Morales Plata  : 1950  MRN: [id number]  Referring provider: Braxton Castro MD  Dx:   Encounter Diagnosis     ICD-10-CM    1  Aftercare following right knee joint replacement surgery Z47 1     Z96 651    2  Difficulty walking R26 2    3  Acute pain of right knee M25 561      Subjective:  Julious Setting states his right knee is finally feeling better  He is still weak and his leg still gives out  At times  He is concerned still about falling but he is a lot better now  He is finally starting to sleep a lot better  Objective: See treatment log below    Assessment: Tolerated treatment well  Patient exhibited good technique with therapeutic exercises and would benefit from continued PT    Plan: Continue per plan of care  Progress treatment as tolerated         Precautions:  R TKR SX - Replacement Repair / Replace    Daily Treatment Log  Manual       MT, ROM 15' 15' 15'     HEP        Exercise Log      Balance Board 30x ea  30x     Chair Squats        P-Bar-GT-Forward, Backward,Side-Even & Dips   10x     BOSU-Walk 5'  5'     Foam Pad SLR,Hip/KneeFl,Step Ups  (doubled)   30x     Foam Beam   15x     T/G-Squats PF 30x ea 30x ea 30x     W/P-Hip-Abd,Add,Flex,Ext 28 5#-30x 28 5# 30x 28 5#-30x     WP-Squats 28 5#-30x 28 5# 30x 28 5#-30x     Trimax-TKE        Tauvuqr-CS-Zb 2x2' 2x2' 2x2'     NK Table Exer 15#-30x 15# 30x ea 15#-30x     NK Table ROM 2x10' 2x10' 2x10'     TM        Stepper 1-1        Bike  10' 10' L7 10' L7     ME, PE 13' 15' 15'             Modalities      MH&ES NT NT      US NT NT

## 2020-01-06 NOTE — PROGRESS NOTES
PT Re-Evaluation   Today's date: 2020    Patient name: Prabha Davila  : 1950  MRN: [id number]  Referring provider: Anuja Upton MD  Dx:   Encounter Diagnosis     ICD-10-CM    1  Aftercare following right knee joint replacement surgery Z47 1     Z96 651    2  Difficulty walking R26 2    3  Acute pain of right knee M25 561      Assessment  Assessment details: Patient has progressed well and is reaching his full rehab potential   He has improved in his range of motion and his functional ability to stand and walk plus use stairs  His chores have improved and he can perform well in most areas  Impairments: abnormal or restricted ROM, abnormal movement, activity intolerance, impaired balance, pain with function, safety issue, weight-bearing intolerance and poor posture   Understanding of Dx/Px/POC: excellent  Goals  STG 2-4 weeks:    Increase B LE strength 2-5 lbs  Decrease pain by 1-2 levels on 1-10 pain scale  Increase standing/walking tolerance to >30 minutes  Patient independent with HEP  LTG 6-8 weeks:   Increase B LE strength 10-20 lbs  Decrease pain to 0-2/10 with activity  Increase single leg stance >30 seconds  Increase standing/walking tolerance to >90 minutes  Increase range of motion to normal   Improve gait pattern, coordination and balance to normal   D/C with HEP  Plan  Plan details: All planned modality interventions and planned therapy interventions are provided PRN    Patient would benefit from: PT eval and skilled physical therapy  Planned modality interventions: unattended electrical stimulation  Planned therapy interventions: manual therapy, joint mobilization, neuromuscular re-education, patient education, postural training, self care, strengthening, stretching, therapeutic activities, therapeutic exercise, therapeutic training, transfer training, balance, balance/weight bearing training, coordination, flexibility, gait training, home exercise program and graded exercise  Frequency: 3x week  Duration in weeks: 6  Treatment plan discussed with: patient      Subjective Evaluation    Pain  Quality: discomfort, knife-like, pulling, squeezing, sharp, radiating and tight  Relieving factors: heat, change in position, relaxation, rest and support  Aggravating factors: lifting, stair climbing, walking, standing and running  Progression: improved    Treatments  Previous treatment: physical therapy  Current treatment: physical therapy  Patient Goals  Patient goals for therapy: decreased pain, improved balance, increased motion, return to work, return to [employer] activities, independence with ADLs/IADLs and increased strength        Objective     Date of onset:  9/10/2019    Date of Surgery:  9/10/2019    History of Present Episode: 9/19/2019  Bee Monahan states his original Right Total Knee Replacement became infected  He just had his old R TKR SX removed and installed a new R TKR SX  Past Medical History:    9/19/2019  Bee Monahan reports R TKR SX   Neck Sx  Pain still in buttocks  L TKR SX     Previous Level of Functional Ability:  9/19/2019  Bee Monahan states his right knee became worse and worse and he finally went for a R TKR SX replacement  Inspection / Palpation:  Knee:  9/19/2019  Mesomorphic / Endomorphic body type  No signs of infection  No signs of wounds  Mild signs of drainage  Moderate signs of ecchymotic regions  Moderate signs of erythremic regions  Moderate signs of muscle spasm  Moderate signs of muscle guarding  Moderate signs of tenderness reported to palpation  Moderate signs of swelling  Positive signs of a surgery site  Current conditions appears consistent with recent acute R TKR SX episode  Chief Complaints:  9/19/2019  Bjorn reports moderate to severe difficulty with standing  Bee Monahan reports moderate to severe difficulty with walking  Bee Monahan reports moderate to severe difficulty with movement / use of his right knee    Bee Monahan reports moderate to severe difficulty with use of stairs  Lloyd Veliz reports severe difficulty with running  Lloyd Veliz reports severe difficulty with jumping  Lloyd Veliz reports moderate to severe difficulty with use of inclines  Lloyd Veliz reports moderate difficulty with sleeping  Lloyd Veliz reports moderate to severe difficulty with his strength and endurance  Lloyd Veliz reports moderate to severe limitations with his range of motion  Lloyd Veliz reports moderate to severe difficulty lying on his right knee region      KNEE PAIN Resting Moving Palpation Standing Walking   9/19/2019 Lt 0 0 0 0 0   9/19/2019 Rt 3-5 5-8 5-8 4-8 4-8   10/22/2019 Rt 1-3 2-5 2-5 2-5 2-5   11/13/2019 Rt 1-3 1-4 1-4 1-4 1-4   11/25/2019 Rt 1-3 1-4 1-4 1-4 1-4   01/02/2020 Rt 0-2 0-3 0-3 0-3 0-3     KNEE PAIN Running Stairs Sleeping Twisting Jumping   9/19/2019 Lt 0 0 0 0 0   9/19/2019 Rt NA 8-10 4-8 5-9 NA   10/22/2019 Rt NA 3-6 2-5 2-5 NA   11/13/2019 Rt NA 2-5 1-4 1-4 NA   11/25/2019 Rt NA 1-5 1-4 1-4 NA   01/02/2020 Rt 2-6 0-3 0-3 0-3 3-6     KNEE/ AROM Flexion Extension SLR   9/19/2019 Lt 135° 0° 85°   9/19/2019 Rt 54° 12° 74°   10/22/2019 Rt 105° 4° 81°   11/13/2019 Rt 115° 1° 89°   11/25/2019 Rt 117° 0° 92°   01/02/2020 Rt 125° 0° 95°     KNEE MMT Flexion Extension Varus Stress Valgus Stress   9/19/2019 Lt 0/10  45 lbs 0/10  38 lbs 0/10  37 lbs 0/10  36 lbs   9/19/2019 Rt 7/10  6 lbs 7/10  3 lbs 7/10  5 lbs 7/10  4 lbs   10/22/2019 Rt 3/10  17 lbs 3/10  15 lbs 3/10  17 lbs 3/10  16 lbs   11/13/2019 Rt 2/10  21 lbs 2/10  18 lbs 2/10  21 lbs 2/10  20 lbs   11/25/2019 Rt 2/10  23 lbs 2/10  22 lbs 2/10  22 lbs 2/10  22 lbs   01/02/2020 Rt 1/10  26 lbs 1/10  27 lbs 1/10  25 lbs 1/10  26 lbs     Precautions:  R TKR SX - Replacement Repair / Replace

## 2020-01-06 NOTE — PROGRESS NOTES
PT Re-Evaluation   Today's date: 2019    Patient name: Sonu Pacheco  : 1950  MRN: [id number]  Referring provider: Faheem Proctor MD  Dx:   Encounter Diagnosis     ICD-10-CM    1  Aftercare following right knee joint replacement surgery Z47 1     Z96 651    2  Difficulty walking R26 2    3  Acute pain of right knee M25 561      Assessment  Assessment details: Patient is slowly improving  He still has pain and limitations with strength and range of motion but his doing much better than when he started his rehab program   Patient states his knee will swell sometimes  Impairments: abnormal gait, abnormal or restricted ROM, abnormal movement, activity intolerance, impaired balance, pain with function, safety issue and weight-bearing intolerance  Understanding of Dx/Px/POC: excellent  Goals  STG 2-4 weeks:    Increase B LE strength 2-5 lbs  Decrease pain by 1-2 levels on 1-10 pain scale  Increase standing/walking tolerance to >30 minutes  Patient independent with HEP  LTG 6-8 weeks:   Increase B LE strength 10-20 lbs  Decrease pain to 0-2/10 with activity  Increase single leg stance >30 seconds  Increase standing/walking tolerance to >90 minutes  Increase range of motion to normal   Improve gait pattern, coordination and balance to normal   D/C with HEP  Plan  Plan details: All planned modality interventions and planned therapy interventions are provided PRN    Patient would benefit from: PT eval and skilled physical therapy  Planned modality interventions: unattended electrical stimulation  Planned therapy interventions: joint mobilization, manual therapy, balance, balance/weight bearing training, neuromuscular re-education, patient education, self care, strengthening, stretching, therapeutic activities, therapeutic exercise, therapeutic training, transfer training, home exercise program, graded exercise, gait training, flexibility and coordination  Frequency: 3x week  Duration in weeks: 6      Subjective Evaluation    Pain  Quality: discomfort, pulling, sharp, squeezing and tight  Relieving factors: change in position, relaxation, rest, support, ice and heat  Aggravating factors: lifting, running, stair climbing, walking and standing  Progression: improved    Treatments  Previous treatment: physical therapy  Current treatment: physical therapy  Patient Goals  Patient goals for therapy: decreased pain, improved balance, increased motion, return to work, return to [employer] activities, independence with ADLs/IADLs and increased strength        Objective     Date of onset:  9/10/2019    Date of Surgery:  9/10/2019    History of Present Episode: 9/19/2019  Kalpana Schulte states his original Right Total Knee Replacement became infected  He just had his old R TKR SX removed and installed a new R TKR SX  Past Medical History:    9/19/2019  Kalpana Schulte reports R TKR SX   Neck Sx  Pain still in buttocks  L TKR SX     Previous Level of Functional Ability:  9/19/2019  Kalpana Schulte states his right knee became worse and worse and he finally went for a R TKR SX replacement  Inspection / Palpation:  Knee:  9/19/2019  Mesomorphic / Endomorphic body type  No signs of infection  No signs of wounds  Mild signs of drainage  Moderate signs of ecchymotic regions  Moderate signs of erythremic regions  Moderate signs of muscle spasm  Moderate signs of muscle guarding  Moderate signs of tenderness reported to palpation  Moderate signs of swelling  Positive signs of a surgery site  Current conditions appears consistent with recent acute R TKR SX episode  Chief Complaints:  9/19/2019  Bjorn reports moderate to severe difficulty with standing  Kalpana Schulte reports moderate to severe difficulty with walking  Kalpana Schulte reports moderate to severe difficulty with movement / use of his right knee  Kalpana Schulte reports moderate to severe difficulty with use of stairs    Kalpana Schulte reports severe difficulty with running  Shanice Alexander reports severe difficulty with jumping  Shanice Alexander reports moderate to severe difficulty with use of inclines  Shanice Alexander reports moderate difficulty with sleeping  Shanice Alexander reports moderate to severe difficulty with his strength and endurance  Shanice Alexander reports moderate to severe limitations with his range of motion  Shanice Alexander reports moderate to severe difficulty lying on his right knee region      KNEE PAIN Resting Moving Palpation Standing Walking   9/19/2019 Lt 0 0 0 0 0   9/19/2019 Rt 3-5 5-8 5-8 4-8 4-8   10/22/2019 Rt 1-3 2-5 2-5 2-5 2-5   11/13/2019 Rt 1-3 1-4 1-4 1-4 1-4   11/25/2019 Rt 1-3 1-4 1-4 1-4 1-4     KNEE PAIN Running Stairs Sleeping Twisting Jumping   9/19/2019 Lt 0 0 0 0 0   9/19/2019 Rt NA 8-10 4-8 5-9 NA   10/22/2019 Rt NA 3-6 2-5 2-5 NA   11/13/2019 Rt NA 2-5 1-4 1-4 NA   11/25/2019 Rt NA 1-5 1-4 1-4 NA     KNEE/ AROM Flexion Extension SLR   9/19/2019 Lt 135° 0° 85°   9/19/2019 Rt 54° 12° 74°   10/22/2019 Rt 105° 4° 81°   11/13/2019 Rt 115° 1° 89°   11/25/2019 ° 0° 92°     KNEE MMT Flexion Extension Varus Stress Valgus Stress   9/19/2019 Lt 0/10  45 lbs 0/10  38 lbs 0/10  37 lbs 0/10  36 lbs   9/19/2019 Rt 7/10  6 lbs 7/10  3 lbs 7/10  5 lbs 7/10  4 lbs   10/22/2019 Rt 3/10  17 lbs 3/10  15 lbs 3/10  17 lbs 3/10  16 lbs   11/13/2019 Rt 2/10  21 lbs 2/10  18 lbs 2/10  21 lbs 2/10  20 lbs   11/25/2019 Rt 2/10  23 lbs 2/10  22 lbs 2/10  22 lbs 2/10  22 lbs     Precautions:  R TKR SX - Replacement Repair / Replace

## 2020-01-07 ENCOUNTER — TRANSCRIBE ORDERS (OUTPATIENT)
Dept: PHYSICAL THERAPY | Facility: CLINIC | Age: 70
End: 2020-01-07

## 2020-01-07 DIAGNOSIS — Z47.1 AFTERCARE FOLLOWING RIGHT KNEE JOINT REPLACEMENT SURGERY: Primary | ICD-10-CM

## 2020-01-07 DIAGNOSIS — Z96.651 AFTERCARE FOLLOWING RIGHT KNEE JOINT REPLACEMENT SURGERY: Primary | ICD-10-CM

## 2020-01-07 DIAGNOSIS — R26.2 DIFFICULTY WALKING: ICD-10-CM

## 2020-01-07 DIAGNOSIS — M25.561 ACUTE PAIN OF RIGHT KNEE: ICD-10-CM

## 2020-01-08 ENCOUNTER — OFFICE VISIT (OUTPATIENT)
Dept: PHYSICAL THERAPY | Facility: CLINIC | Age: 70
End: 2020-01-08
Payer: MEDICARE

## 2020-01-08 DIAGNOSIS — Z47.1 AFTERCARE FOLLOWING RIGHT KNEE JOINT REPLACEMENT SURGERY: Primary | ICD-10-CM

## 2020-01-08 DIAGNOSIS — R26.2 DIFFICULTY WALKING: ICD-10-CM

## 2020-01-08 DIAGNOSIS — Z96.651 AFTERCARE FOLLOWING RIGHT KNEE JOINT REPLACEMENT SURGERY: Primary | ICD-10-CM

## 2020-01-08 DIAGNOSIS — M25.561 ACUTE PAIN OF RIGHT KNEE: ICD-10-CM

## 2020-01-08 PROCEDURE — 97140 MANUAL THERAPY 1/> REGIONS: CPT

## 2020-01-08 PROCEDURE — 97110 THERAPEUTIC EXERCISES: CPT

## 2020-01-08 NOTE — PROGRESS NOTES
Today's date: 2020  Patient name: Lisandra Correa  : 1950  MRN: [id number]  Referring provider: Geoffrey King MD  Dx:   Encounter Diagnosis     ICD-10-CM    1  Aftercare following right knee joint replacement surgery Z47 1     Z96 651    2  Difficulty walking R26 2    3  Acute pain of right knee M25 561      Subjective:  No new c/o pain today  Objective: See treatment log below    Assessment: Tolerated treatment well  Patient exhibited good technique with therapeutic exercises and would benefit from continued PT to increase R knee ROM/strength and endurance to improve mobility and gait  Plan: Continue per plan of care  Progress treatment as tolerated         Precautions:  R TKR SX - Replacement Repair / Replace    Daily Treatment Log  Manual      MT, ROM 15' 15' 15' 15'    HEP        Exercise Log     Balance Board 30x ea  30x 30x ea    Chair Squats        P-Bar-GT-Forward, Backward,Side-Even & Dips   10x     BOSU-Walk 5'  5'     Foam Pad SLR,Hip/KneeFl,Step Ups  (doubled)   30x     Foam Beam   15x     T/G-Squats PF 30x ea 30x ea 30x 30xea    W/P-Hip-Abd,Add,Flex,Ext 28 5#-30x 28 5# 30x 28 5#-30x 32 5# 30x    WP-Squats 28 5#-30x 28 5# 30x 28 5#-30x 32 5# 30x    Trimax-TKE        Mlxjxzd-KX-Mj 2x2' 2x2' 2x2' 2x2'    NK Table Exer 15#-30x 15# 30x ea 15#-30x 15# 30x ea    NK Table ROM 2x10' 2x10' 2x10' 2x10'    TM        Stepper 1-1        Bike  10' 10' L7 10' L7 10' L9    ME, PE 13' 15' 15' 15'            Modalities

## 2020-01-10 ENCOUNTER — OFFICE VISIT (OUTPATIENT)
Dept: PHYSICAL THERAPY | Facility: CLINIC | Age: 70
End: 2020-01-10
Payer: MEDICARE

## 2020-01-10 DIAGNOSIS — Z47.1 AFTERCARE FOLLOWING RIGHT KNEE JOINT REPLACEMENT SURGERY: Primary | ICD-10-CM

## 2020-01-10 DIAGNOSIS — Z96.651 AFTERCARE FOLLOWING RIGHT KNEE JOINT REPLACEMENT SURGERY: Primary | ICD-10-CM

## 2020-01-10 DIAGNOSIS — M25.561 ACUTE PAIN OF RIGHT KNEE: ICD-10-CM

## 2020-01-10 DIAGNOSIS — R26.2 DIFFICULTY WALKING: ICD-10-CM

## 2020-01-10 PROCEDURE — 97112 NEUROMUSCULAR REEDUCATION: CPT | Performed by: PHYSICAL THERAPIST

## 2020-01-10 PROCEDURE — 97110 THERAPEUTIC EXERCISES: CPT

## 2020-01-10 PROCEDURE — 97140 MANUAL THERAPY 1/> REGIONS: CPT | Performed by: PHYSICAL THERAPIST

## 2020-01-10 PROCEDURE — 97116 GAIT TRAINING THERAPY: CPT | Performed by: PHYSICAL THERAPIST

## 2020-01-10 NOTE — PROGRESS NOTES
Today's date: 1/10/2020  Patient name: Kj Scott  : 1950  MRN: [id number]  Referring provider: Sarah Chakraborty MD  Dx:   Encounter Diagnosis     ICD-10-CM    1  Aftercare following right knee joint replacement surgery Z47 1     Z96 651    2  Difficulty walking R26 2    3  Acute pain of right knee M25 561      Subjective:  No new c/o pain today  Objective: See treatment log below    Assessment: Tolerated treatment well  Patient exhibited good technique with therapeutic exercises and would benefit from continued PT to increase ROM/strength and endurance to improve mobility and gait  Plan: Continue per plan of care  Progress treatment as tolerated         Precautions:  R TKR SX - Replacement Repair / Replace    Daily Treatment Log  Manual  12/30 1/2 1/6 1/8 1/10   MT, ROM 15' 15' 15' 15' 15'   HEP        Exercise Log 12/30 1/2 1/6 1/8 1/10   Balance Board 30x ea  30x 30x ea 30x ea   Chair Squats        P-Bar-GT-Forward, Backward,Side-Even & Dips   10x     BOSU-Walk 5'  5'  5'   Foam Pad SLR,Hip/KneeFl,Step Ups  (doubled)   30x     Foam Beam   15x     T/G-Squats PF 30x ea 30x ea 30x 30xea 30x ea   W/P-Hip-Abd,Add,Flex,Ext 28 5#-30x 28 5# 30x 28 5#-30x 32 5# 30x 32 5# 30x ea   WP-Squats 28 5#-30x 28 5# 30x 28 5#-30x 32 5# 30x 32 5# 30x    Trimax-TKE        Btkdjwd-RM-Sl 2x2' 2x2' 2x2' 2x2' 2x2'   NK Table Exer 15#-30x 15# 30x ea 15#-30x 15# 30x ea 15# 30x ea   NK Table ROM 2x10' 2x10' 2x10' 2x10' 2x10'   TM        Stepper 1-1        Bike  10' 10' L7 10' L7 10' L9 10' L9   ME, PE 13' 15' 15' 15' 15'           Modalities 12/30 1/2 1/6 1/8 1/10

## 2020-01-13 ENCOUNTER — OFFICE VISIT (OUTPATIENT)
Dept: PHYSICAL THERAPY | Facility: CLINIC | Age: 70
End: 2020-01-13
Payer: MEDICARE

## 2020-01-13 DIAGNOSIS — Z96.651 AFTERCARE FOLLOWING RIGHT KNEE JOINT REPLACEMENT SURGERY: Primary | ICD-10-CM

## 2020-01-13 DIAGNOSIS — Z47.1 AFTERCARE FOLLOWING RIGHT KNEE JOINT REPLACEMENT SURGERY: Primary | ICD-10-CM

## 2020-01-13 DIAGNOSIS — R26.2 DIFFICULTY WALKING: ICD-10-CM

## 2020-01-13 DIAGNOSIS — M25.561 ACUTE PAIN OF RIGHT KNEE: ICD-10-CM

## 2020-01-13 PROCEDURE — 97140 MANUAL THERAPY 1/> REGIONS: CPT

## 2020-01-13 PROCEDURE — 97110 THERAPEUTIC EXERCISES: CPT

## 2020-01-13 NOTE — PROGRESS NOTES
Today's date: 2020  Patient name: Marcela Bryson  : 1950  MRN: [id number]  Referring provider: Michael Lowe MD  Dx:   Encounter Diagnosis     ICD-10-CM    1  Aftercare following right knee joint replacement surgery Z47 1     Z96 651    2  Difficulty walking R26 2    3  Acute pain of right knee M25 561      Subjective:  No new c/o pain today  Objective: See treatment log below    Assessment: Tolerated treatment well  Patient exhibited good technique with therapeutic exercises and would benefit from continued PT to increase R knee ROM/strength and endurance to improve mobility and gait  Plan: Continue per plan of care  Progress treatment as tolerated         Precautions:  R TKR SX - Replacement Repair / Replace    Daily Treatment Log  Manual  1/13    1/10   MT, ROM 15'    15'   HEP        Exercise Log 1/13    1/10   Balance Board     30x ea   Chair Squats        P-Bar-GT-Forward, Backward,Side-Even & Dips        BOSU-Walk     5'   Foam Pad SLR,Hip/KneeFl,Step Ups  (doubled)        Foam Beam        T/G-Squats PF     30x ea   W/P-Hip-Abd,Add,Flex,Ext     32 5# 30x ea   WP-Squats     32 5# 30x    Trimax-TKE        Gsrckll-MT-Qb     2x2'   NK Table Exer 15#-30x    15# 30x ea   NK Table ROM 10'    2x10'   TM        Stepper 1-1        Bike  10'    10' L9   ME, PE 13'    15'           Modalities 1/13    1/10

## 2020-01-15 ENCOUNTER — OFFICE VISIT (OUTPATIENT)
Dept: PHYSICAL THERAPY | Facility: CLINIC | Age: 70
End: 2020-01-15
Payer: MEDICARE

## 2020-01-15 DIAGNOSIS — R26.2 DIFFICULTY WALKING: ICD-10-CM

## 2020-01-15 DIAGNOSIS — Z47.1 AFTERCARE FOLLOWING RIGHT KNEE JOINT REPLACEMENT SURGERY: Primary | ICD-10-CM

## 2020-01-15 DIAGNOSIS — Z96.651 AFTERCARE FOLLOWING RIGHT KNEE JOINT REPLACEMENT SURGERY: Primary | ICD-10-CM

## 2020-01-15 DIAGNOSIS — M25.561 ACUTE PAIN OF RIGHT KNEE: ICD-10-CM

## 2020-01-15 PROCEDURE — 97140 MANUAL THERAPY 1/> REGIONS: CPT

## 2020-01-15 PROCEDURE — 97110 THERAPEUTIC EXERCISES: CPT

## 2020-01-15 NOTE — PROGRESS NOTES
Today's date: 1/15/2020  Patient name: Mariano Ross  : 1950  MRN: [id number]  Referring provider: Kavon Kingston MD  Dx:   Encounter Diagnosis     ICD-10-CM    1  Aftercare following right knee joint replacement surgery Z47 1     Z96 651    2  Difficulty walking R26 2    3  Acute pain of right knee M25 561      Subjective:  No new c/o pain today  "I was able to walk my stairs without holding on to the railing yesterday "    Objective: See treatment log below    Assessment: Tolerated treatment well  Patient exhibited good technique with therapeutic exercises and would benefit from continued PT to increase R knee ROM/strength and endurance to improve mobility and gait  Plan: Continue per plan of care  Progress treatment as tolerated         Precautions:  R TKR SX - Replacement Repair / Replace    Daily Treatment Log  Manual  1/13 1/15      MT, ROM 15' 15'      HEP        Exercise Log 1/13 1/15      Balance Board  30x ea      Chair Squats        P-Bar-GT-Forward, Backward,Side-Even & Dips        BOSU-Walk  5'      Foam Pad SLR,Hip/KneeFl,Step Ups  (doubled)        Foam Beam        T/G-Squats PF  30x ea      W/P-Hip-Abd,Add,Flex,Ext  27 5# 30x ea      WP-Squats  27 5# 30x       Trimax-TKE        Aswvpjm-LC-Xa        NK Table Exer 15#-30x 15# 30x ea      NK Table ROM 10' 2x10'      TM        Stepper 1-1        Bike  10' 10' L9      ME, PE 13' 15'              Modalities 1/13 1/15

## 2020-01-17 ENCOUNTER — OFFICE VISIT (OUTPATIENT)
Dept: PHYSICAL THERAPY | Facility: CLINIC | Age: 70
End: 2020-01-17
Payer: MEDICARE

## 2020-01-17 DIAGNOSIS — Z96.651 AFTERCARE FOLLOWING RIGHT KNEE JOINT REPLACEMENT SURGERY: Primary | ICD-10-CM

## 2020-01-17 DIAGNOSIS — Z47.1 AFTERCARE FOLLOWING RIGHT KNEE JOINT REPLACEMENT SURGERY: Primary | ICD-10-CM

## 2020-01-17 DIAGNOSIS — R26.2 DIFFICULTY WALKING: ICD-10-CM

## 2020-01-17 DIAGNOSIS — M25.561 ACUTE PAIN OF RIGHT KNEE: ICD-10-CM

## 2020-01-17 PROCEDURE — 97110 THERAPEUTIC EXERCISES: CPT

## 2020-01-17 PROCEDURE — 97140 MANUAL THERAPY 1/> REGIONS: CPT

## 2020-01-17 NOTE — PROGRESS NOTES
Today's date: 2020  Patient name: Hitesh Jones  : 1950  MRN: [id number]  Referring provider: Calderon Sanchez MD  Dx:   Encounter Diagnosis     ICD-10-CM    1  Aftercare following right knee joint replacement surgery Z47 1     Z96 651    2  Difficulty walking R26 2    3  Acute pain of right knee M25 561      Subjective:  No new c/o pain today  Objective: See treatment log below    Assessment: Tolerated treatment well  Patient exhibited good technique with therapeutic exercises and would benefit from continued PT to increase  R knee ROM/strength and endurance to improve mobility and gait  Plan: Continue per plan of care  Progress treatment as tolerated         Precautions:  R TKR SX - Replacement Repair / Replace    Daily Treatment Log  Manual  1/13 1/15 1/17     MT, ROM 15' 15' 15'     HEP        Exercise Log 1/13 1/15 1/17     Balance Board  30x ea NT     Chair Squats        P-Bar-GT-Forward, Backward,Side-Even & Dips        BOSU-Walk  5' NT     Foam Pad SLR,Hip/KneeFl,Step Ups  (doubled)        Foam Beam        T/G-Squats PF  30x ea 30x ea     W/P-Hip-Abd,Add,Flex,Ext  27 5# 30x ea 27 5# 30x ea     WP-Squats  27 5# 30x  27 5# 30x ea     Trimax-TKE        Vgwjvqt-LZ-Gy        NK Table Exer 15#-30x 15# 30x ea 15# 30x ea     NK Table ROM 10' 2x10' 2x10'     TM        Stepper 1-1        Bike  10' 10' L9 10' L9     ME, PE 15' 15' 15'             Modalities 1/13 1/15 1/17

## 2020-01-21 ENCOUNTER — OFFICE VISIT (OUTPATIENT)
Dept: PHYSICAL THERAPY | Facility: CLINIC | Age: 70
End: 2020-01-21
Payer: MEDICARE

## 2020-01-21 DIAGNOSIS — Z96.651 AFTERCARE FOLLOWING RIGHT KNEE JOINT REPLACEMENT SURGERY: Primary | ICD-10-CM

## 2020-01-21 DIAGNOSIS — M25.561 ACUTE PAIN OF RIGHT KNEE: ICD-10-CM

## 2020-01-21 DIAGNOSIS — R26.2 DIFFICULTY WALKING: ICD-10-CM

## 2020-01-21 DIAGNOSIS — Z47.1 AFTERCARE FOLLOWING RIGHT KNEE JOINT REPLACEMENT SURGERY: Primary | ICD-10-CM

## 2020-01-21 PROCEDURE — 97110 THERAPEUTIC EXERCISES: CPT

## 2020-01-21 PROCEDURE — 97140 MANUAL THERAPY 1/> REGIONS: CPT

## 2020-01-21 NOTE — PROGRESS NOTES
Today's date: 2020  Patient name: Kj Scott  : 1950  MRN: [id number]  Referring provider: Sarah Chakraborty MD  Dx:   Encounter Diagnosis     ICD-10-CM    1  Aftercare following right knee joint replacement surgery Z47 1     Z96 651    2  Difficulty walking R26 2    3  Acute pain of right knee M25 561      Subjective:  No new c/o pain today  Objective: See treatment log below    Assessment: Tolerated treatment well  Patient exhibited good technique with therapeutic exercises and would benefit from continued PT to increase R knee ROM/strength and endurance to improve mobility and gait  Plan: Continue per plan of care  Progress treatment as tolerated         Precautions:  R TKR SX - Replacement Repair / Replace    Daily Treatment Log  Manual  1/13 1/15 1/17 1/21    MT, ROM 15' 15' 15' 30'    HEP        Exercise Log 1/13 1/15 1/17 1/21    Balance Board  30x ea NT NT    Chair Squats        P-Bar-GT-Forward, Backward,Side-Even & Dips        BOSU-Walk  5' NT NT    Foam Pad SLR,Hip/KneeFl,Step Ups  (doubled)        Foam Beam        T/G-Squats PF  30x ea 30x ea 30x ea    W/P-Hip-Abd,Add,Flex,Ext  27 5# 30x ea 27 5# 30x ea 32 5#   30x ea    WP-Squats  27 5# 30x  27 5# 30x ea 32 5#   30x ea    Trimax-TKE        [place]-ET-Iz        NK Table Exer 15#-30x 15# 30x ea 15# 30x ea NT    NK Table ROM 10' 2x10' 2x10' 2x10'    TM        Stepper 1-1        Bike  10' 10' L9 10' L9 10' L8    ME, PE 15' 15' 15' 15'            Modalities 1/13 1/15 1/17 1/21

## 2020-01-23 ENCOUNTER — OFFICE VISIT (OUTPATIENT)
Dept: PHYSICAL THERAPY | Facility: CLINIC | Age: 70
End: 2020-01-23
Payer: MEDICARE

## 2020-01-23 DIAGNOSIS — M25.561 ACUTE PAIN OF RIGHT KNEE: ICD-10-CM

## 2020-01-23 DIAGNOSIS — Z96.651 AFTERCARE FOLLOWING RIGHT KNEE JOINT REPLACEMENT SURGERY: Primary | ICD-10-CM

## 2020-01-23 DIAGNOSIS — R26.2 DIFFICULTY WALKING: ICD-10-CM

## 2020-01-23 DIAGNOSIS — Z47.1 AFTERCARE FOLLOWING RIGHT KNEE JOINT REPLACEMENT SURGERY: Primary | ICD-10-CM

## 2020-01-23 PROCEDURE — 97140 MANUAL THERAPY 1/> REGIONS: CPT

## 2020-01-23 PROCEDURE — 97110 THERAPEUTIC EXERCISES: CPT

## 2020-01-23 NOTE — PROGRESS NOTES
Today's date: 2020  Patient name: Sonu Pacheco  : 1950  MRN: [id number]  Referring provider: Faheem Proctor MD  Dx:   Encounter Diagnosis     ICD-10-CM    1  Aftercare following right knee joint replacement surgery Z47 1     Z96 651    2  Difficulty walking R26 2    3  Acute pain of right knee M25 561      Subjective:  No new c/o pain today  Objective: See treatment log below    Assessment: Tolerated treatment well  Patient exhibited good technique with therapeutic exercises and would benefit from continued PT to increase R knee ROM/strength and endurance to improve mobility and gait  Plan: Continue per plan of care  Progress treatment as tolerated         Precautions:  R TKR SX - Replacement Repair / Replace    Daily Treatment Log  Manual  1/13 1/15 1/17 1/21 1/23   MT, ROM 15' 15' 15' 30' 15'   HEP        Exercise Log 1/13 1/15 1/17 1/21 1/23   Balance Board  30x ea NT NT NT   Chair Squats        P-Bar-GT-Forward, Backward,Side-Even & Dips        BOSU-Walk  5' NT NT NT   Foam Pad SLR,Hip/KneeFl,Step Ups  (doubled)        Foam Beam        T/G-Squats PF  30x ea 30x ea 30x ea 30x ea   W/P-Hip-Abd,Add,Flex,Ext  27 5# 30x ea 27 5# 30x ea 32 5#   30x ea 32 5#   30x ea   WP-Squats  27 5# 30x  27 5# 30x ea 32 5#   30x ea 32 5#   30x ea   Mat-DKTC,Bridge,ComboBridge w/GreenTB     30x ea   Rglhvpp-SF-Ef     2x2'   NK Table Exer 15#-30x 15# 30x ea 15# 30x ea NT 15# 30x ea   NK Table ROM 10' 2x10' 2x10' 2x10' NT   TM        Stepper 1-1        Bike  10' 10' L9 10' L9 10' L8 10' L8   ME, PE 15' 15' 15' 15' 15'           Modalities 1/13 1/15 1/17 1/21 1/23

## 2020-01-24 ENCOUNTER — APPOINTMENT (OUTPATIENT)
Dept: PHYSICAL THERAPY | Facility: CLINIC | Age: 70
End: 2020-01-24
Payer: MEDICARE

## 2020-01-28 ENCOUNTER — OFFICE VISIT (OUTPATIENT)
Dept: PHYSICAL THERAPY | Facility: CLINIC | Age: 70
End: 2020-01-28
Payer: MEDICARE

## 2020-01-28 DIAGNOSIS — Z47.1 AFTERCARE FOLLOWING RIGHT KNEE JOINT REPLACEMENT SURGERY: Primary | ICD-10-CM

## 2020-01-28 DIAGNOSIS — R26.2 DIFFICULTY WALKING: ICD-10-CM

## 2020-01-28 DIAGNOSIS — Z96.651 AFTERCARE FOLLOWING RIGHT KNEE JOINT REPLACEMENT SURGERY: Primary | ICD-10-CM

## 2020-01-28 DIAGNOSIS — M25.561 ACUTE PAIN OF RIGHT KNEE: ICD-10-CM

## 2020-01-28 PROCEDURE — 97140 MANUAL THERAPY 1/> REGIONS: CPT

## 2020-01-28 PROCEDURE — 97110 THERAPEUTIC EXERCISES: CPT

## 2020-01-30 ENCOUNTER — APPOINTMENT (OUTPATIENT)
Dept: PHYSICAL THERAPY | Facility: CLINIC | Age: 70
End: 2020-01-30
Payer: MEDICARE

## 2020-01-31 ENCOUNTER — OFFICE VISIT (OUTPATIENT)
Dept: PHYSICAL THERAPY | Facility: CLINIC | Age: 70
End: 2020-01-31
Payer: MEDICARE

## 2020-01-31 DIAGNOSIS — Z96.651 AFTERCARE FOLLOWING RIGHT KNEE JOINT REPLACEMENT SURGERY: Primary | ICD-10-CM

## 2020-01-31 DIAGNOSIS — M25.561 ACUTE PAIN OF RIGHT KNEE: ICD-10-CM

## 2020-01-31 DIAGNOSIS — Z47.1 AFTERCARE FOLLOWING RIGHT KNEE JOINT REPLACEMENT SURGERY: Primary | ICD-10-CM

## 2020-01-31 DIAGNOSIS — R26.2 DIFFICULTY WALKING: ICD-10-CM

## 2020-01-31 PROCEDURE — 97164 PT RE-EVAL EST PLAN CARE: CPT | Performed by: PHYSICAL THERAPIST

## 2020-01-31 PROCEDURE — 97140 MANUAL THERAPY 1/> REGIONS: CPT

## 2020-01-31 PROCEDURE — 97110 THERAPEUTIC EXERCISES: CPT

## 2020-01-31 NOTE — PROGRESS NOTES
Today's date: 2020  Patient name: Lisandra Correa  : 1950  MRN: [id number]  Referring provider: Geoffrey King MD  Dx:   Encounter Diagnosis     ICD-10-CM    1  Aftercare following right knee joint replacement surgery Z47 1     Z96 651    2  Difficulty walking R26 2    3  Acute pain of right knee M25 561      Subjective:  No new c/o pain today  Objective: See treatment log below    Assessment: Tolerated treatment well  Patient exhibited good technique with therapeutic exercises and would benefit from continued PT to increase R knee ROM/strength and endurance to improve mobility and gait  Plan: Continue per plan of care  Progress treatment as tolerated         Precautions:  R TKR SX - Replacement Repair / Replace    Daily Treatment Log  Manual        MT, ROM 15' 30'      HEP        Exercise Log       Balance Board        Chair Squats        P-Bar-GT-Forward, Backward,Side-Even & Dips        BOSU-Walk        Foam Pad SLR,Hip/KneeFl,Step Ups  (doubled)        Foam Beam        T/G-Squats PF 30x ea 30x ea      W/P-Hip-Abd,Add,Flex,Ext 32 5#   30x ea NT      WP-Squats 32 5#   30x ea NT      Mat-DKTC,Bridge,ComboBridge w/GreenTB 30x ea NT      Lbflesy-DX-Ci 2x2' NT      NK Table Exer 15#-30x NT      NK Table ROM 2x10' NT      TM        Stepper 1-1        Bike  10' 10'      ME, PE 15' 15'              Modalities

## 2020-02-03 NOTE — PROGRESS NOTES
PT Discharge  Today's date: 2020    Patient name: Lo Rosas  : 1950  MRN: [id number]  Referring provider: Dorothy Sanchez MD  Dx:   Encounter Diagnosis     ICD-10-CM    1  Aftercare following right knee joint replacement surgery Z47 1     Z96 651    2  Difficulty walking R26 2    3  Acute pain of right knee M25 561      Assessment  Assessment details: Patient has progressed well  He finally has minimal pain and limitations  His strength and ROM have improved well  Impairments: abnormal or restricted ROM, activity intolerance, pain with function and safety issue  Understanding of Dx/Px/POC: excellent  Goals  STG 2-4 weeks:    Increase B LE strength 2-5 lbs  Decrease pain by 1-2 levels on 1-10 pain scale  Increase standing/walking tolerance to >30 minutes  Patient independent with HEP  LTG 6-8 weeks:   Increase B LE strength 10-20 lbs  Decrease pain to 0-2/10 with activity  Increase single leg stance >30 seconds  Increase standing/walking tolerance to >90 minutes  Increase range of motion to normal   Improve gait pattern, coordination and balance to normal   D/C with HEP  Plan  Plan details: All planned modality interventions and planned therapy interventions are provided PRN    Spoke with patient to try HEP only for the next three to six weeks and return if still an issue to follow above program   Patient would benefit from: PT eval and skilled physical therapy  Planned modality interventions: unattended electrical stimulation  Planned therapy interventions: joint mobilization, manual therapy, neuromuscular re-education, patient education, postural training, self care, strengthening, stretching, therapeutic activities, therapeutic exercise, therapeutic training, transfer training, home exercise program, graded exercise, gait training, flexibility, coordination, balance and balance/weight bearing training  Frequency: 3x week  Duration in weeks: 12  Treatment plan discussed with: patient      Subjective Evaluation    Pain  Quality: discomfort, knife-like, pulling, squeezing, tight and throbbing  Relieving factors: rest, support, relaxation, change in position, heat and ice  Aggravating factors: running, lifting, stair climbing, walking and standing  Progression: improved    Treatments  Previous treatment: physical therapy  Current treatment: physical therapy  Patient Goals  Patient goals for therapy: decreased pain, improved balance, increased motion, return to work, return to [employer] activities, independence with ADLs/IADLs and increased strength        Objective     Date of onset:  9/10/2019    Date of Surgery:  9/10/2019    History of Present Episode: 9/19/2019  Alma Dhillon states his original Right Total Knee Replacement became infected  He just had his old R TKR SX removed and installed a new R TKR SX  Past Medical History:    9/19/2019  Alma Dhillon reports R TKR SX   Neck Sx  Pain still in buttocks  L TKR SX     Previous Level of Functional Ability:  9/19/2019  Alma Dhillon states his right knee became worse and worse and he finally went for a R TKR SX replacement  Inspection / Palpation:  Knee:  9/19/2019  Mesomorphic / Endomorphic body type  No signs of infection  No signs of wounds  Mild signs of drainage  Moderate signs of ecchymotic regions  Moderate signs of erythremic regions  Moderate signs of muscle spasm  Moderate signs of muscle guarding  Moderate signs of tenderness reported to palpation  Moderate signs of swelling  Positive signs of a surgery site  Current conditions appears consistent with recent acute R TKR SX episode  Chief Complaints:  9/19/2019  Bjorn reports moderate to severe difficulty with standing  Alma Dhillon reports moderate to severe difficulty with walking  Alma Dhillon reports moderate to severe difficulty with movement / use of his right knee  Alma Dhillon reports moderate to severe difficulty with use of stairs    Alma Dhillon reports severe difficulty with running  Kalpana Schulte reports severe difficulty with jumping  Kalpana Schulte reports moderate to severe difficulty with use of inclines  Kalpana Schulte reports moderate difficulty with sleeping  Kalpana Schulte reports moderate to severe difficulty with his strength and endurance  Kalpana Schulte reports moderate to severe limitations with his range of motion  Kalpana Schulte reports moderate to severe difficulty lying on his right knee region      KNEE PAIN Resting Moving Palpation Standing Walking   9/19/2019 Lt 0 0 0 0 0   9/19/2019 Rt 3-5 5-8 5-8 4-8 4-8   10/22/2019 Rt 1-3 2-5 2-5 2-5 2-5   11/13/2019 Rt 1-3 1-4 1-4 1-4 1-4   11/25/2019 Rt 1-3 1-4 1-4 1-4 1-4   01/02/2020 Rt 0-2 0-3 0-3 0-3 0-3   01/31/2020 Rt 0 0-1 0-1 0-1 0-1     KNEE PAIN Running Stairs Sleeping Twisting Jumping   9/19/2019 Lt 0 0 0 0 0   9/19/2019 Rt NA 8-10 4-8 5-9 NA   10/22/2019 Rt NA 3-6 2-5 2-5 NA   11/13/2019 Rt NA 2-5 1-4 1-4 NA   11/25/2019 Rt NA 1-5 1-4 1-4 NA   01/02/2020 Rt 2-6 0-3 0-3 0-3 3-6   01/31/2020 Rt 1-3 0-1 0-1 0-1 1-3     KNEE/ AROM Flexion Extension SLR   9/19/2019 Lt 135° 0° 85°   9/19/2019 Rt 54° 12° 74°   10/22/2019 Rt 105° 4° 81°   11/13/2019 Rt 115° 1° 89°   11/25/2019 Rt 117° 0° 92°   01/02/2020 Rt 125° 0° 95°   01/31/2020 Rt 128° 0° 97°     KNEE MMT Flexion Extension Varus Stress Valgus Stress   9/19/2019 Lt 0/10  45 lbs 0/10  38 lbs 0/10  37 lbs 0/10  36 lbs   9/19/2019 Rt 7/10  6 lbs 7/10  3 lbs 7/10  5 lbs 7/10  4 lbs   10/22/2019 Rt 3/10  17 lbs 3/10  15 lbs 3/10  17 lbs 3/10  16 lbs   11/13/2019 Rt 2/10  21 lbs 2/10  18 lbs 2/10  21 lbs 2/10  20 lbs   11/25/2019 Rt 2/10  23 lbs 2/10  22 lbs 2/10  22 lbs 2/10  22 lbs   01/02/2020 Rt 1/10  26 lbs 1/10  27 lbs 1/10  25 lbs 1/10  26 lbs   01/31/2020 Rt 0/10  32 lbs 0/10  32 lbs 0/10  29 lbs 0/10  31 lbs     Precautions:  R TKR SX - Replacement Repair / Replace

## 2020-05-04 NOTE — PROGRESS NOTES
Patient needs prescription sent in with increased dosage. Today's date: 10/2/2019  Patient name: Juancho Redmond  : 1950  MRN: [id number]  Referring provider: Simona Muñoz MD  Dx:   Encounter Diagnosis     ICD-10-CM    1  Aftercare following right knee joint replacement surgery Z47 1     Z96 651    2  Difficulty walking R26 2    3  Acute pain of right knee M25 561      Subjective:  Sid Beach states his right knee is slowly feeling better  Objective: See treatment log below    Assessment: Tolerated treatment well  Patient exhibited good technique with therapeutic exercises and would benefit from continued PT    Plan: Continue per plan of care  Progress treatment as tolerated         Precautions:  R TKR SX - Replacement Repair / Replace    Daily Treatment Log  Manual  10/1 10/2      MT, ROM 15' 15'      HEP        Exercise Log 10/1 10/2      Balance Board 30x 30x      Chair Squats 30x 30x      P-Bar-GT-Forward, Backward,Side-Even & Dips 30x 30x      BOSU-Walk 5' 5'      Foam Pad SLR,Hip/KneeFl,Step Ups 30x 30x      Foam Beam 12x 12x      Fitter-Slalom        Monster Steps        BAPS- L-2        T/G-Squats PF 30x 30x      W/P-Hip-Abd,Add,Flex,Ext 7 5#-30x 7 5#-30x      WP-Squats        Trimax-TKE        Smhjfjn-JX-Zv        NK Table Exer        NK Table ROM 2 5#-10' 2 5#-30x      TM        Stepper        Bike 10' 10'      ME, PE 15' 15'              Modalities 10/1 10/2      MH&ES        US

## 2020-08-06 ENCOUNTER — EVALUATION (OUTPATIENT)
Dept: PHYSICAL THERAPY | Facility: CLINIC | Age: 70
End: 2020-08-06
Payer: MEDICARE

## 2020-08-06 DIAGNOSIS — Z48.89 AFTERCARE FOLLOWING SURGERY: Primary | ICD-10-CM

## 2020-08-06 DIAGNOSIS — M25.512 ACUTE PAIN OF LEFT SHOULDER: ICD-10-CM

## 2020-08-06 PROCEDURE — 97162 PT EVAL MOD COMPLEX 30 MIN: CPT | Performed by: PHYSICAL THERAPIST

## 2020-08-06 PROCEDURE — 97535 SELF CARE MNGMENT TRAINING: CPT | Performed by: PHYSICAL THERAPIST

## 2020-08-06 NOTE — LETTER
2020  PT Evaluation Plan of Care  Cecille Soriano PA-C  [address]  [address] [place] [address] Darrell Lujan    Patient: Demi Antunez  YOB: 1950   Date of Visit: 2020     Encounter Diagnosis     ICD-10-CM    1  Aftercare following surgery  Z48 89    2  Acute pain of left shoulder  M25 512      Dear Dr Dread Stanford: Thank you for your recent referral of Demi Antunez    Please review the attached evaluation summary from Bjorn's recent visit  Please verify that you agree with the plan of care by signing the attached order  If you have any questions or concerns, please do not hesitate to call  I sincerely appreciate the opportunity to share in the care of one of your patients and hope to have another opportunity to work with you in the near future  Sincerely,    Abbie Willson, PT    Referring Provider:      I certify that I have read the below Plan of Care and certify the need for these services furnished under this plan of treatment while under my care  Cecille Soriano PA-C  [address]  [address] [place] [address] Darrell Lujan  VIA Facsimile: [phone]    Please SIGN ABOVE and return THIS PAGE ONLY to Fax # [phone]        PT Evaluation   Today's date: 2020  Patient name: Demi Antunez  : 1950  MRN: [id number]  Referring provider: Ericka Donnelly  Dx:   Encounter Diagnosis     ICD-10-CM    1  Aftercare following surgery  Z48 89    2  Acute pain of left shoulder  M25 512      Assessment  Assessment details: Patient states he received surgery to his left shoulder region  Impairments: abnormal or restricted ROM, abnormal movement, activity intolerance, lacks appropriate home exercise program, pain with function, safety issue and poor posture   Understanding of Dx/Px/POC: excellent   Prognosis: fair    Goals  STG 2-4 weeks:   Increase UE strength by 3-6 lbs  Decrease pain by 1-2 levels on 1-10 pain scale     Increase UE PROM by 10-15 Degrees in all planes  Reduce C/O pain with lying on either side  Initiate HEP  LTG 6-8 weeks:   Increase UE strength 10-20 lbs  Demonstrate UE AROM WFL in all planes  Decrease pain to <2  Improve strength by 10-20 lbs  Return to lying on their right or left side with minimal difficulty  Improve sleep status limitations to none  D/C with HEP  Plan  Plan details: All planned modality interventions and planned therapy interventions are provided PRN  Patient would benefit from: PT eval  Planned modality interventions: ultrasound, unattended electrical stimulation and TENS  Planned therapy interventions: joint mobilization, manual therapy, balance, balance/weight bearing training, neuromuscular re-education, patient education, postural training, self care, strengthening, stretching, therapeutic activities, therapeutic exercise, therapeutic training, transfer training, home exercise program, graded exercise, gait training, flexibility and coordination  Frequency: 3x week  Duration in weeks: 12  Treatment plan discussed with: patient      Subjective Evaluation    Pain  Quality: discomfort, knife-like, pulling, squeezing, sharp, tight and throbbing  Relieving factors: change in position, relaxation, rest and support  Aggravating factors: lifting, overhead activity, keyboarding and nothing  Progression: improved    Treatments  Current treatment: physical therapy  Patient Goals  Patient goals for therapy: decreased pain, increased motion, return to work, return to [employer] activities, independence with ADLs/IADLs and increased strength      Date of onset:  2/15/2020    Date of Surgery:  7/7/2020    History of Present Episode: 8/7/2020  Hawk Charis states he has had left shoulder issues for many years  He just received surgery around 7/7/2020  Past Medical History:    8/7/2020  Hawk Vizcaino reports R TKR  Left shoulder issues  Neck Issues  Neck surgery C3-5 fused        Previous Level of Functional Ability: 8/7/2020  Bary Shuttmalia states his left shoulder was getting worse and he finally went for surgery  Inspection / Palpation:  Shoulder:  8/7/2020  Mesomorphic / Endomorphic body type  No signs of infection  No signs of wounds  No signs of drainage  No signs of ecchymotic regions  No signs of erythremic regions  Mild to moderate signs of muscle spasm  Mild to moderate signs of muscle guarding  Mild to moderate signs of tenderness reported to palpation  No signs of swelling  Positive signs of a surgery site  Current conditions appears consistent with recent acute recovery from surgery to his left shoulder  Chief Complaints:  8/7/2020  Bjorn reports moderate difficulty with bending his left shoulder  Bary Shutters reports moderate difficulty with movement of his left shoulder  Bary Shutters reports moderate to severe difficulty with use of his left arm  Bary Shutters reports moderate to severe difficulty with lifting / elevating his left arm  Bary Shutters reports moderate to severe difficulty with sleeping  Iony Jacquelineers reports moderate to severe difficulty with his strength and endurance  Dev Ernandez reports moderate limitations with his left shoulder range of motion  Dev Ernandez reports severe difficulty lying on his left shoulder region      SHOULDER PAIN Resting Palpation Moving Lifting Elevating   8/7/2020 Rt 0 0 0 0 0   8/7/2020 Lt  2-6 2-6 2-6 5-8 2-6     SHOULDER PAIN Sleeping Throwing Pushing Pulling Twisting   8/7/2020 Rt 0 0 0 0 0   8/7/2020 Lt  2-6 NA 2-6 2-6 5-8     SHOULDER AROM Flexion Extension Abduction   8/7/2020 Rt 180° 65° 180°   8/7/2020 Lt 165° 51° 165°     SHOULDER AROM Hor Add Ext Rotation Int Rotation   8/7/2020 Rt 45° 98° 95°   8/7/2020 Lt 5° 78° 82°     SHLD MMT / PAIN Flexion Extension Abduction   8/7/2020 Rt 0/10  52 lbs 0/10  58 lbs 0/10  51 lbs   8/7/2020 Lt 2/10  32 lbs 2/10  33 lbs 5/10  15 lbs     SHLD MMT / PAIN Hor Add Ext Rotation Int Rotation   8/7/2020 Rt 0/10  54 lbs 0/10  49 lbs 0/10  56 lbs 8/7/2020 Lt 2/10  21 lbs 5/10  15 lbs 4/10  16 lbs     Shoulder Screen Rotator Cuff Apprehension Anterior  Stability Posterior  Stability   8/7/2020 Rt Negative Negative Negative Negative   8/7/2020 Lt Negative Negative Negative Negative     Shoulder Screen AC Joint SC Joint Drop Arm Adhesive Capsulitis   8/7/2020 Rt Negative Negative Negative Negative   8/7/2020 Lt Negative Negative Negative Negative     Precautions:  Left Shoulder Surgery Rotator Cuff Repair    All treatments below will be provided with a focus on strengthening, flexibility, ROM, postural,   endurance and any possible swelling and pain which may be present without ignoring   neural issues involving balance, coordination and proprioception which is also important   and necessary to provide full functional mobility and quality care        Daily Treatment Log  Manual  8/6       MT, ROM        HEP 15'       Exercise Log 8/6       [facility] [place] [place]        FW-Codmans        FWC-Curls,Tri        Power Web        Digiflex        Finger Ladder        Maria R-BP,PD,Lats,Row        NuStep        W/P-PNF,IR,ER,PU,PS,Throw-Top,Mid,Bot        W/P-OH        [employer] Sup/Pro        E  I  du Louis Wilkinson, PE                Modalities 8/6       &ES        US

## 2020-08-06 NOTE — PROGRESS NOTES
PT Evaluation   Today's date: 2020  Patient name: Karmen Kurtz  : 1950  MRN: [id number]  Referring provider: Ericka Garcia  Dx:   Encounter Diagnosis     ICD-10-CM    1  Aftercare following surgery  Z48 89    2  Acute pain of left shoulder  M25 512      Assessment  Assessment details: Patient states he received surgery to his left shoulder region  Impairments: abnormal or restricted ROM, abnormal movement, activity intolerance, lacks appropriate home exercise program, pain with function, safety issue and poor posture   Understanding of Dx/Px/POC: excellent   Prognosis: fair    Goals  STG 2-4 weeks:   Increase UE strength by 3-6 lbs  Decrease pain by 1-2 levels on 1-10 pain scale  Increase UE PROM by 10-15 Degrees in all planes  Reduce C/O pain with lying on either side  Initiate HEP  LTG 6-8 weeks:   Increase UE strength 10-20 lbs  Demonstrate UE AROM WFL in all planes  Decrease pain to <2  Improve strength by 10-20 lbs  Return to lying on their right or left side with minimal difficulty  Improve sleep status limitations to none  D/C with HEP  Plan  Plan details: All planned modality interventions and planned therapy interventions are provided PRN    Patient would benefit from: PT eval  Planned modality interventions: ultrasound, unattended electrical stimulation and TENS  Planned therapy interventions: joint mobilization, manual therapy, balance, balance/weight bearing training, neuromuscular re-education, patient education, postural training, self care, strengthening, stretching, therapeutic activities, therapeutic exercise, therapeutic training, transfer training, home exercise program, graded exercise, gait training, flexibility and coordination  Frequency: 3x week  Duration in weeks: 12  Treatment plan discussed with: patient      Subjective Evaluation    Pain  Quality: discomfort, knife-like, pulling, squeezing, sharp, tight and throbbing  Relieving factors: change in position, relaxation, rest and support  Aggravating factors: lifting, overhead activity, keyboarding and nothing  Progression: improved    Treatments  Current treatment: physical therapy  Patient Goals  Patient goals for therapy: decreased pain, increased motion, return to work, return to [employer] activities, independence with ADLs/IADLs and increased strength      Date of onset:  2/15/2020    Date of Surgery:  7/7/2020    History of Present Episode: 8/7/2020  Bronxxochitl Polk states he has had left shoulder issues for many years  He just received surgery around 7/7/2020  Past Medical History:    8/7/2020  Melvi Polk reports R TKR  Left shoulder issues  Neck Issues  Neck surgery C3-5 fused  Previous Level of Functional Ability:  8/7/2020  Melvi Polk states his left shoulder was getting worse and he finally went for surgery  Inspection / Palpation:  Shoulder:  8/7/2020  Mesomorphic / Endomorphic body type  No signs of infection  No signs of wounds  No signs of drainage  No signs of ecchymotic regions  No signs of erythremic regions  Mild to moderate signs of muscle spasm  Mild to moderate signs of muscle guarding  Mild to moderate signs of tenderness reported to palpation  No signs of swelling  Positive signs of a surgery site  Current conditions appears consistent with recent acute recovery from surgery to his left shoulder  Chief Complaints:  8/7/2020  Bjorn reports moderate difficulty with bending his left shoulder  Melvi Joaniemarce reports moderate difficulty with movement of his left shoulder  Melvi Hurmarce reports moderate to severe difficulty with use of his left arm  Melvi Hurmarce reports moderate to severe difficulty with lifting / elevating his left arm  Melvi Jam reports moderate to severe difficulty with sleeping  Melvi Polk reports moderate to severe difficulty with his strength and endurance  Melvi Polk reports moderate limitations with his left shoulder range of motion    Melvi Polk reports severe difficulty lying on his left shoulder region  SHOULDER PAIN Resting Palpation Moving Lifting Elevating   8/7/2020 Rt 0 0 0 0 0   8/7/2020 Lt  2-6 2-6 2-6 5-8 2-6     SHOULDER PAIN Sleeping Throwing Pushing Pulling Twisting   8/7/2020 Rt 0 0 0 0 0   8/7/2020 Lt  2-6 NA 2-6 2-6 5-8     SHOULDER AROM Flexion Extension Abduction   8/7/2020 Rt 180° 65° 180°   8/7/2020 Lt 165° 51° 165°     SHOULDER AROM Hor Add Ext Rotation Int Rotation   8/7/2020 Rt 45° 98° 95°   8/7/2020 Lt 5° 78° 82°     SHLD MMT / PAIN Flexion Extension Abduction   8/7/2020 Rt 0/10  52 lbs 0/10  58 lbs 0/10  51 lbs   8/7/2020 Lt 2/10  32 lbs 2/10  33 lbs 5/10  15 lbs     SHLD MMT / PAIN Hor Add Ext Rotation Int Rotation   8/7/2020 Rt 0/10  54 lbs 0/10  49 lbs 0/10  56 lbs   8/7/2020 Lt 2/10  21 lbs 5/10  15 lbs 4/10  16 lbs     Shoulder Screen Rotator Cuff Apprehension Anterior  Stability Posterior  Stability   8/7/2020 Rt Negative Negative Negative Negative   8/7/2020 Lt Negative Negative Negative Negative     Shoulder Screen AC Joint SC Joint Drop Arm Adhesive Capsulitis   8/7/2020 Rt Negative Negative Negative Negative   8/7/2020 Lt Negative Negative Negative Negative     Precautions:  Left Shoulder Surgery Rotator Cuff Repair    All treatments below will be provided with a focus on strengthening, flexibility, ROM, postural,   endurance and any possible swelling and pain which may be present without ignoring   neural issues involving balance, coordination and proprioception which is also important   and necessary to provide full functional mobility and quality care        Daily Treatment Log  Manual  8/6       MT, ROM        HEP 15'       Exercise Log 8/6       [facility]        FWC-Codmans        FWC-Curls,Tri        Power Web        Digiflex        Finger Ladder        Maria R-BP,PD,Lats,Row        NuStep        W/P-PNF,IR,ER,PU,PS,Throw-Top,Mid,Bot        W/P-OH        Rotation Bar Sup/Pro        APERA BAGS [place], [place] Modalities 8/6       MH&ES        US

## 2020-08-07 ENCOUNTER — TRANSCRIBE ORDERS (OUTPATIENT)
Dept: PHYSICAL THERAPY | Facility: CLINIC | Age: 70
End: 2020-08-07

## 2020-08-07 DIAGNOSIS — M25.512 ACUTE PAIN OF LEFT SHOULDER: ICD-10-CM

## 2020-08-07 DIAGNOSIS — Z48.89 AFTERCARE FOLLOWING SURGERY: Primary | ICD-10-CM

## 2020-08-13 ENCOUNTER — OFFICE VISIT (OUTPATIENT)
Dept: PHYSICAL THERAPY | Facility: CLINIC | Age: 70
End: 2020-08-13
Payer: MEDICARE

## 2020-08-13 DIAGNOSIS — M25.512 ACUTE PAIN OF LEFT SHOULDER: ICD-10-CM

## 2020-08-13 DIAGNOSIS — Z48.89 AFTERCARE FOLLOWING SURGERY: Primary | ICD-10-CM

## 2020-08-13 PROCEDURE — 97140 MANUAL THERAPY 1/> REGIONS: CPT | Performed by: PHYSICAL THERAPIST

## 2020-08-13 PROCEDURE — 97112 NEUROMUSCULAR REEDUCATION: CPT | Performed by: PHYSICAL THERAPIST

## 2020-08-13 PROCEDURE — 97110 THERAPEUTIC EXERCISES: CPT | Performed by: PHYSICAL THERAPIST

## 2020-08-13 NOTE — PROGRESS NOTES
Today's date: 2020  Patient name: Vance Leyva  : 1950  MRN: [id number]  Referring provider: Marah King, [place]  Dx:   Encounter Diagnosis     ICD-10-CM    1  Aftercare following surgery  Z48 89    2  Acute pain of left shoulder  M25 512      Subjective:  Melvi Polk states his left shoulder is painful and weak  Objective: See treatment log below  Melvi Polk continues to be advised to follow his home exercise program as tolerated  When Melvi Polk is feeling good he follows his rehab exercises in the gym and on other days he follows his home exercise program at home as tolerated  In the future we plan on having Bjorn stay at home and follow his home exercise program for four to six weeks and than assess for either more Rehab treatments or discharge from Rehab care  Assessment: Tolerated treatment well  Patient exhibited good technique with therapeutic exercises and would benefit from continued PT  Bjorn's goals are to continue to improve with his rehab program and improve with functional mobility, speed, repetition and decreased c/o pain with his gym and home exercise program   Bjorn's final goal for his rehab is to be discharged from Rehab care after obtaining his full functional rehab potential     Plan: Continue per plan of care  Progress treatment as tolerated  Precautions:  Left Shoulder Surgery Rotator Cuff Repair    All treatments below will be provided with a focus on strengthening, flexibility, ROM, postural,   endurance and any possible swelling and pain which may be present without ignoring   neural issues involving balance, coordination and proprioception which is also important   and necessary to provide full functional mobility and quality care        Daily Treatment Log  Manual        MT, ROM  15'      HEP 15'       Exercise Log       [facility]  10'      [facility]ACE[facility]-Zander[facility]        Power Web        Digiflex        Finger Ladder Maria R-BP,PD,Lats,Row        NuStep        W/P-PNF,IR,ER,PU,PS,Throw-Top,Mid,Bot  5#-30x      W/P-OH  10'x2      Rotation Bar Sup/Pro  [address], [place]  57'              Modalities 8/6 8/13      MH&ES        US

## 2020-08-14 ENCOUNTER — OFFICE VISIT (OUTPATIENT)
Dept: PHYSICAL THERAPY | Facility: CLINIC | Age: 70
End: 2020-08-14
Payer: MEDICARE

## 2020-08-14 DIAGNOSIS — M25.512 ACUTE PAIN OF LEFT SHOULDER: ICD-10-CM

## 2020-08-14 DIAGNOSIS — Z48.89 AFTERCARE FOLLOWING SURGERY: Primary | ICD-10-CM

## 2020-08-14 PROCEDURE — 97014 ELECTRIC STIMULATION THERAPY: CPT

## 2020-08-14 PROCEDURE — 97112 NEUROMUSCULAR REEDUCATION: CPT

## 2020-08-14 PROCEDURE — 97140 MANUAL THERAPY 1/> REGIONS: CPT

## 2020-08-14 NOTE — PROGRESS NOTES
Today's date: 2020  Patient name: Melanie Phillip  : 1950  MRN: [id number]  Referring provider: Ericka Justice  Dx:   Encounter Diagnosis     ICD-10-CM    1  Aftercare following surgery  Z48 89    2  Acute pain of left shoulder  M25 512      Subjective:  No new c/o pain today  Objective: See treatment log below  Chloe Hughes continues to be advised to follow his home exercise program as tolerated  When Chloe Hughes is feeling good he follows his rehab exercises in the gym and on other days he follows his home exercise program at home as tolerated  In the future we plan on having Bjorn stay at home and follow his home exercise program for four to six weeks and than assess for either more Rehab treatments or discharge from Rehab care  Assessment: Tolerated treatment well  Patient exhibited good technique with therapeutic exercises and would benefit from continued PT  Bjorn's goals are to continue to improve with his rehab program and improve with functional mobility, speed, repetition and decreased c/o pain with his gym and home exercise program   Bjorn's final goal for his rehab is to be discharged from Rehab care after obtaining his full functional rehab potential     Plan: Continue per plan of care  Progress treatment as tolerated  Precautions:  Left Shoulder Surgery Rotator Cuff Repair    All treatments below will be provided with a focus on strengthening, flexibility, ROM, postural,   endurance and any possible swelling and pain which may be present without ignoring   neural issues involving balance, coordination and proprioception which is also important   and necessary to provide full functional mobility and quality care        Daily Treatment Log  Manual       MT, ROM  15' 20'     HEP 15'       Exercise Log      UBC  10'      FW-Payton        FW-Zander,Tri        Power Web        Digiflex        Finger Ladder        Maria R-BP,PD,Lats,Row NuStep        W/P-PNF,IR,ER,PU,PS,Throw-Top,Mid,Bot  5#-30x 5# 30x     W/P-OH  10'x2 8'     Rotation Bar Sup/Pro  30x      Anneliese Smart  60' 15'             Modalities 8/6 8/13 8/14     &ES   20'     US

## 2020-08-17 ENCOUNTER — OFFICE VISIT (OUTPATIENT)
Dept: PHYSICAL THERAPY | Facility: CLINIC | Age: 70
End: 2020-08-17
Payer: MEDICARE

## 2020-08-17 DIAGNOSIS — Z96.651 AFTERCARE FOLLOWING RIGHT KNEE JOINT REPLACEMENT SURGERY: ICD-10-CM

## 2020-08-17 DIAGNOSIS — Z48.89 AFTERCARE FOLLOWING SURGERY: Primary | ICD-10-CM

## 2020-08-17 DIAGNOSIS — Z47.1 AFTERCARE FOLLOWING RIGHT KNEE JOINT REPLACEMENT SURGERY: ICD-10-CM

## 2020-08-17 DIAGNOSIS — R26.2 DIFFICULTY WALKING: ICD-10-CM

## 2020-08-17 DIAGNOSIS — M25.561 ACUTE PAIN OF RIGHT KNEE: ICD-10-CM

## 2020-08-17 DIAGNOSIS — M25.512 ACUTE PAIN OF LEFT SHOULDER: ICD-10-CM

## 2020-08-17 PROCEDURE — 97140 MANUAL THERAPY 1/> REGIONS: CPT | Performed by: PHYSICAL THERAPIST

## 2020-08-17 PROCEDURE — 97110 THERAPEUTIC EXERCISES: CPT | Performed by: PHYSICAL THERAPIST

## 2020-08-17 PROCEDURE — 97112 NEUROMUSCULAR REEDUCATION: CPT | Performed by: PHYSICAL THERAPIST

## 2020-08-17 NOTE — PROGRESS NOTES
Today's date: 2020  Patient name: Nicho Erickson  : 1950  MRN: [id number]  Referring provider: Lossie Jeans, [place]  Dx:   Encounter Diagnosis     ICD-10-CM    1  Aftercare following surgery  Z48 89    2  Acute pain of left shoulder  M25 512    3  Aftercare following right knee joint replacement surgery  Z47 1     Z96 651    4  Difficulty walking  R26 2    5  Acute pain of right knee  M25 561      Subjective:  Cayetano Reyes states his left arm is feeling better  Objective: See treatment log below  Cayetano Reyes continues to be advised to follow his home exercise program as tolerated  When Cayetano Reyes is feeling good he follows his rehab exercises in the gym and on other days he follows his home exercise program at home as tolerated  In the future we plan on having Bjorn stay at home and follow his home exercise program for four to six weeks and than assess for either more Rehab treatments or discharge from Rehab care  Assessment: Tolerated treatment well  Patient exhibited good technique with therapeutic exercises and would benefit from continued PT  Bjorn's goals are to continue to improve with his rehab program and improve with functional mobility, speed, repetition and decreased c/o pain with his gym and home exercise program   Bjorn's final goal for his rehab is to be discharged from Rehab care after obtaining his full functional rehab potential     Plan: Continue per plan of care  Progress treatment as tolerated  Precautions:  Left Shoulder Surgery Rotator Cuff Repair    All treatments below will be provided with a focus on strengthening, flexibility, ROM, postural,   endurance and any possible swelling and pain which may be present without ignoring   neural issues involving balance, coordination and proprioception which is also important   and necessary to provide full functional mobility and quality care        Daily Treatment Log  Manual      MT, ROM  15' 20' 23' HEP 15'       Exercise Log 8/6 8/13 8/14 8/17    UBC  10'  10'    FWC-Codmans        FWC-Curls,Tri        Power Web        Digiflex        Finger Ladder        Maria R-BP,PD,Lats,Row        NuStep        W/P-PNF,IR,ER,PU,PS,Throw-Top,Mid,Bot  5#-30x 5# 30x 10#-30x    W/P-OH  10'x2 10' 10'    Rotation Bar Sup/Pro  30x  30x    E  I  anyg Myers        [place], [place]  53' 15' 15'            Modalities 8/6 8/13 8/14 8/17    MH&ES   20' 20'    US

## 2020-08-20 ENCOUNTER — OFFICE VISIT (OUTPATIENT)
Dept: PHYSICAL THERAPY | Facility: CLINIC | Age: 70
End: 2020-08-20
Payer: MEDICARE

## 2020-08-20 DIAGNOSIS — M25.512 ACUTE PAIN OF LEFT SHOULDER: ICD-10-CM

## 2020-08-20 DIAGNOSIS — M25.561 ACUTE PAIN OF RIGHT KNEE: ICD-10-CM

## 2020-08-20 DIAGNOSIS — Z96.651 AFTERCARE FOLLOWING RIGHT KNEE JOINT REPLACEMENT SURGERY: ICD-10-CM

## 2020-08-20 DIAGNOSIS — Z48.89 AFTERCARE FOLLOWING SURGERY: Primary | ICD-10-CM

## 2020-08-20 DIAGNOSIS — R26.2 DIFFICULTY WALKING: ICD-10-CM

## 2020-08-20 DIAGNOSIS — Z47.1 AFTERCARE FOLLOWING RIGHT KNEE JOINT REPLACEMENT SURGERY: ICD-10-CM

## 2020-08-20 PROCEDURE — 97112 NEUROMUSCULAR REEDUCATION: CPT | Performed by: PHYSICAL THERAPIST

## 2020-08-20 PROCEDURE — 97140 MANUAL THERAPY 1/> REGIONS: CPT | Performed by: PHYSICAL THERAPIST

## 2020-08-20 PROCEDURE — 97110 THERAPEUTIC EXERCISES: CPT | Performed by: PHYSICAL THERAPIST

## 2020-08-20 NOTE — PROGRESS NOTES
Today's date: 2020  Patient name: Bertha Parks  : 1950  MRN: [id number]  Referring provider: Ericka Sinclair  Dx:   Encounter Diagnosis     ICD-10-CM    1  Aftercare following surgery  Z48 89    2  Acute pain of left shoulder  M25 512    3  Aftercare following right knee joint replacement surgery  Z47 1     Z96 651    4  Difficulty walking  R26 2    5  Acute pain of right knee  M25 561      Subjective:  Margot Osorio states his left shoulder is slowly feeling better  Objective: See treatment log below  Margot Osorio continues to be advised to follow his home exercise program as tolerated  When Margot Osorio is feeling good he follows his rehab exercises in the gym and on other days he follows his home exercise program at home as tolerated  In the future we plan on having Bjorn stay at home and follow his home exercise program for four to six weeks and than assess for either more Rehab treatments or discharge from Rehab care  Assessment: Tolerated treatment well  Patient exhibited good technique with therapeutic exercises and would benefit from continued PT  Bjorn's goals are to continue to improve with his rehab program and improve with functional mobility, speed, repetition and decreased c/o pain with his gym and home exercise program   Bjorn's final goal for his rehab is to be discharged from Rehab care after obtaining his full functional rehab potential     Plan: Continue per plan of care  Progress treatment as tolerated  Precautions:  Left Shoulder Surgery Rotator Cuff Repair    All treatments below will be provided with a focus on strengthening, flexibility, ROM, postural,   endurance and any possible swelling and pain which may be present without ignoring   neural issues involving balance, coordination and proprioception which is also important   and necessary to provide full functional mobility and quality care        Daily Treatment Log  Manual     MT ROM  15' 20' 23' 23'   HEP 15'       Exercise Log 8/6 8/13 8/14 8/17 8/20   UBC  10'  10' 10'   FWC-Codmans        FWC-Curls,Tri        Power Web        Digiflex        Finger Ladder        Maria R-BP,PD,Lats,Row        NuStep        W/P-PNF,IR,ER,PU,PS,Throw-Top,Mid,Bot  5#-30x 5# 30x 10#-30x 10#-30x   W/P-OH  10'x2 10' 10' 10'   Rotation Bar Sup/Pro  30x  30x 30x   Steven        [place], [place]  53' 15' 15' 15'           Modalities 8/6 8/13 8/14 8/17 8/20   MH&ES   20' 20' 20'   US

## 2020-08-21 ENCOUNTER — OFFICE VISIT (OUTPATIENT)
Dept: PHYSICAL THERAPY | Facility: CLINIC | Age: 70
End: 2020-08-21
Payer: MEDICARE

## 2020-08-21 DIAGNOSIS — M25.561 ACUTE PAIN OF RIGHT KNEE: ICD-10-CM

## 2020-08-21 DIAGNOSIS — R26.2 DIFFICULTY WALKING: ICD-10-CM

## 2020-08-21 DIAGNOSIS — M25.512 ACUTE PAIN OF LEFT SHOULDER: ICD-10-CM

## 2020-08-21 DIAGNOSIS — Z48.89 AFTERCARE FOLLOWING SURGERY: Primary | ICD-10-CM

## 2020-08-21 DIAGNOSIS — Z96.651 AFTERCARE FOLLOWING RIGHT KNEE JOINT REPLACEMENT SURGERY: ICD-10-CM

## 2020-08-21 DIAGNOSIS — Z47.1 AFTERCARE FOLLOWING RIGHT KNEE JOINT REPLACEMENT SURGERY: ICD-10-CM

## 2020-08-21 PROCEDURE — 97140 MANUAL THERAPY 1/> REGIONS: CPT | Performed by: PHYSICAL THERAPIST

## 2020-08-21 PROCEDURE — 97110 THERAPEUTIC EXERCISES: CPT | Performed by: PHYSICAL THERAPIST

## 2020-08-21 PROCEDURE — 97112 NEUROMUSCULAR REEDUCATION: CPT | Performed by: PHYSICAL THERAPIST

## 2020-08-21 NOTE — PROGRESS NOTES
Today's date: 2020  Patient name: Fredy Murillo  : 1950  MRN: [id number]  Referring provider: Ericka Dyer  Dx:   Encounter Diagnosis     ICD-10-CM    1  Aftercare following surgery  Z48 89    2  Acute pain of left shoulder  M25 512    3  Aftercare following right knee joint replacement surgery  Z47 1     Z96 651    4  Difficulty walking  R26 2    5  Acute pain of right knee  M25 561      Subjective:  Flor Rizvi states his left shoulder is feeling better  Objective: See treatment log below  Flor Rizvi continues to be advised to follow his home exercise program as tolerated  When Flor Rizvi is feeling good he follows his rehab exercises in the gym and on other days he follows his home exercise program at home as tolerated  In the future we plan on having Bjorn stay at home and follow his home exercise program for four to six weeks and than assess for either more Rehab treatments or discharge from Rehab care  Assessment: Tolerated treatment well  Patient exhibited good technique with therapeutic exercises and would benefit from continued PT  Bjorn's goals are to continue to improve with his rehab program and improve with functional mobility, speed, repetition and decreased c/o pain with his gym and home exercise program   Bjorn's final goal for his rehab is to be discharged from Rehab care after obtaining his full functional rehab potential     Plan: Continue per plan of care  Progress treatment as tolerated  Precautions:  Left Shoulder Surgery Rotator Cuff Repair    All treatments below will be provided with a focus on strengthening, flexibility, ROM, postural,   endurance and any possible swelling and pain which may be present without ignoring   neural issues involving balance, coordination and proprioception which is also important   and necessary to provide full functional mobility and quality care        Daily Treatment Log  Manual         MT, ROM 23       HEP Exercise Log 8/21       [facility] [employer]        Power Web        Digiflex        Finger Ladder        Maria R-BP,PD,Lats,Row        NuStep        W/P-PNF,IR,ER,PU,PS,Throw-Top,Mid,Bot 10#-30x       W/P-OH [employer] Sup/Pro 30x       E  I  angy Myers        [place], [place] 15'               Modalities 8/21       &ES        US

## 2020-08-24 ENCOUNTER — OFFICE VISIT (OUTPATIENT)
Dept: PHYSICAL THERAPY | Facility: CLINIC | Age: 70
End: 2020-08-24
Payer: MEDICARE

## 2020-08-24 DIAGNOSIS — M25.512 ACUTE PAIN OF LEFT SHOULDER: ICD-10-CM

## 2020-08-24 DIAGNOSIS — Z48.89 AFTERCARE FOLLOWING SURGERY: Primary | ICD-10-CM

## 2020-08-24 PROCEDURE — 97014 ELECTRIC STIMULATION THERAPY: CPT

## 2020-08-24 PROCEDURE — 97140 MANUAL THERAPY 1/> REGIONS: CPT

## 2020-08-24 PROCEDURE — 97112 NEUROMUSCULAR REEDUCATION: CPT

## 2020-08-24 NOTE — PROGRESS NOTES
Today's date: 2020  Patient name: Demi Antunez  : 1950  MRN: [id number]  Referring provider: Francesca Donnelly  Dx:   Encounter Diagnosis     ICD-10-CM    1  Aftercare following surgery  Z48 89    2  Acute pain of left shoulder  M25 512      Subjective:  No new c/o pain today  Objective: See treatment log below  Laurence Geronimo continues to be advised to follow his home exercise program as tolerated  When Laurence Geronimo is feeling good he follows his rehab exercises in the gym and on other days he follows his home exercise program at home as tolerated  In the future we plan on having Bjorn stay at home and follow his home exercise program for four to six weeks and than assess for either more Rehab treatments or discharge from Rehab care  Assessment: Tolerated treatment well  Patient exhibited good technique with therapeutic exercises and would benefit from continued PT to increase L shoulder ROM/strength and endurance to improve mobility  Bjorn's goals are to continue to improve with his rehab program and improve with functional mobility, speed, repetition and decreased c/o pain with his gym and home exercise program   Bjorn's final goal for his rehab is to be discharged from Rehab care after obtaining his full functional rehab potential     Plan: Continue per plan of care  Progress treatment as tolerated  Precautions:  Left Shoulder Surgery Rotator Cuff Repair    All treatments below will be provided with a focus on strengthening, flexibility, ROM, postural,   endurance and any possible swelling and pain which may be present without ignoring   neural issues involving balance, coordination and proprioception which is also important   and necessary to provide full functional mobility and quality care        Daily Treatment Log  Manual        MT, ROM 23 30'      HEP        Exercise Log       [facility]        [employer]        [facility] [employer]        Digiflex Finger Ladder        Maria R-BP,PD,Lats,Row        NuStep  10' L7      W/P-PNF,IR,ER,PU,PS,Throw-Top,Mid,Bot 10#-30x 12 5# Top  5# Mid 30x      W/P-OH 10'x2 2x10'      Rotation Bar Sup/Pro 30x NT      Felisa Smart 32' 15'              Modalities 8/21 8/24      MH&ES        US

## 2020-08-27 ENCOUNTER — OFFICE VISIT (OUTPATIENT)
Dept: PHYSICAL THERAPY | Facility: CLINIC | Age: 70
End: 2020-08-27
Payer: MEDICARE

## 2020-08-27 DIAGNOSIS — Z48.89 AFTERCARE FOLLOWING SURGERY: Primary | ICD-10-CM

## 2020-08-27 DIAGNOSIS — M25.512 ACUTE PAIN OF LEFT SHOULDER: ICD-10-CM

## 2020-08-27 PROCEDURE — 97140 MANUAL THERAPY 1/> REGIONS: CPT | Performed by: PHYSICAL THERAPIST

## 2020-08-27 PROCEDURE — 97110 THERAPEUTIC EXERCISES: CPT | Performed by: PHYSICAL THERAPIST

## 2020-08-27 PROCEDURE — 97112 NEUROMUSCULAR REEDUCATION: CPT | Performed by: PHYSICAL THERAPIST

## 2020-08-27 NOTE — PROGRESS NOTES
Today's date: 2020  Patient name: Mehrdad Junior  : 1950  MRN: [id number]  Referring provider: Ericka Watts  Dx:   Encounter Diagnosis     ICD-10-CM    1  Aftercare following surgery  Z48 89    2  Acute pain of left shoulder  M25 512    3  Aftercare following right knee joint replacement surgery  Z47 1     Z96 651    4  Difficulty walking  R26 2    5  Acute pain of right knee  M25 561      Subjective:  Irineo Crews states his left shoulder is slowly feeling better  Objective: See treatment log below  Irineo Crews continues to be advised to follow his home exercise program as tolerated  When Irineo Crews is feeling good he follows his rehab exercises in the gym and on other days he follows his home exercise program at home as tolerated  In the future we plan on having Bjorn stay at home and follow his home exercise program for four to six weeks and than assess for either more Rehab treatments or discharge from Rehab care  Assessment: Tolerated treatment well  Patient exhibited good technique with therapeutic exercises and would benefit from continued PT  Bjorn's goals are to continue to improve with his rehab program and improve with functional mobility, speed, repetition and decreased c/o pain with his gym and home exercise program   Bjorn's final goal for his rehab is to be discharged from Rehab care after obtaining his full functional rehab potential     Plan: Continue per plan of care  Progress treatment as tolerated  Precautions:  Left Shoulder Surgery Rotator Cuff Repair    All treatments below will be provided with a focus on strengthening, flexibility, ROM, postural,   endurance and any possible swelling and pain which may be present without ignoring   neural issues involving balance, coordination and proprioception which is also important   and necessary to provide full functional mobility and quality care        Daily Treatment Log  Manual       MT, ROM 23 30' 30'     HEP        Exercise Log 8/21 8/24 8/27     [facility]        [employer]        Power Web        Digiflex        Finger Ladder        Maria R-BP,PD,Lats,Row        NuStep  10' L7 10' L7     W/P-PNF,IR,ER,PU,PS,Throw-Top,Mid,Bot 10#-30x 12 5# Top  5# Mid 30x 12 5#-30x     W/P-OH 10'x2 2x10' 10'x2     Rotation Bar Sup/Pro 30x NT 30x     Steven        [place], [place] 18' 15' 15'             Modalities 8/21 8/24 8/27     MH&ES   20'     US

## 2020-08-28 ENCOUNTER — OFFICE VISIT (OUTPATIENT)
Dept: PHYSICAL THERAPY | Facility: CLINIC | Age: 70
End: 2020-08-28
Payer: MEDICARE

## 2020-08-28 DIAGNOSIS — M25.512 ACUTE PAIN OF LEFT SHOULDER: ICD-10-CM

## 2020-08-28 DIAGNOSIS — Z48.89 AFTERCARE FOLLOWING SURGERY: Primary | ICD-10-CM

## 2020-08-28 PROCEDURE — 97140 MANUAL THERAPY 1/> REGIONS: CPT | Performed by: PHYSICAL THERAPIST

## 2020-08-28 PROCEDURE — 97110 THERAPEUTIC EXERCISES: CPT | Performed by: PHYSICAL THERAPIST

## 2020-08-28 PROCEDURE — 97112 NEUROMUSCULAR REEDUCATION: CPT | Performed by: PHYSICAL THERAPIST

## 2020-08-28 NOTE — PROGRESS NOTES
Today's date: 2020  Patient name: Lyric Waller  : 1950  MRN: [id number]  Referring provider: Ericka Zabala  Dx:   Encounter Diagnosis     ICD-10-CM    1  Aftercare following surgery  Z48 89    2  Acute pain of left shoulder  M25 512      Subjective:  Benson Hunter states his left shoulder is slowly feeling better  Objective: See treatment log below  Benson Hunter continues to be advised to follow his home exercise program as tolerated  When Benson Hunter is feeling good he follows his rehab exercises in the gym and on other days he follows his home exercise program at home as tolerated  In the future we plan on having Bjorn stay at home and follow his home exercise program for four to six weeks and than assess for either more Rehab treatments or discharge from Rehab care  Assessment: Tolerated treatment well  Patient exhibited good technique with therapeutic exercises and would benefit from continued PT  Bjorn's goals are to continue to improve with his rehab program and improve with functional mobility, speed, repetition and decreased c/o pain with his gym and home exercise program   Bjorn's final goal for his rehab is to be discharged from Rehab care after obtaining his full functional rehab potential     Plan: Continue per plan of care  Precautions:  Left Shoulder Surgery Rotator Cuff Repair    All treatments below will be provided with a focus on strengthening, flexibility, ROM, postural,   endurance and any possible swelling and pain which may be present without ignoring   neural issues involving balance, coordination and proprioception which is also important   and necessary to provide full functional mobility and quality care        Daily Treatment Log  Manual      MT, ROM 23 30' 30' 30'    HEP        Exercise Log     [facility]        [employer]        Power Web        Digiflex        Finger Ladder Maria R-BP,PD,Lats,Row        NuStep  10' L7 10' L7 10' L7    W/P-PNF,IR,ER,PU,PS,Throw-Top,Mid,Bot 10#-30x 12 5# Top  5# Mid 30x 12 5#-30x 12 5#-30x    W/P-OH 10'x2 2x10' 10'x2 10'x2    Rotation Bar Sup/Pro 30x NT 30x [address] [facility] [address] [place], [place] 53' 15' 15' 15'            Modalities 8/21 8/24 8/27 8/28    MH&ES   20' 20'

## 2020-08-31 ENCOUNTER — OFFICE VISIT (OUTPATIENT)
Dept: PHYSICAL THERAPY | Facility: CLINIC | Age: 70
End: 2020-08-31
Payer: MEDICARE

## 2020-08-31 DIAGNOSIS — M25.512 PAIN IN LEFT SHOULDER: ICD-10-CM

## 2020-08-31 DIAGNOSIS — Z48.89 AFTERCARE FOLLOWING SURGERY: Primary | ICD-10-CM

## 2020-08-31 DIAGNOSIS — M25.512 ACUTE PAIN OF LEFT SHOULDER: ICD-10-CM

## 2020-08-31 DIAGNOSIS — Z48.89 ENCOUNTER FOR OTHER SPECIFIED SURGICAL AFTERCARE: ICD-10-CM

## 2020-08-31 PROCEDURE — 97140 MANUAL THERAPY 1/> REGIONS: CPT

## 2020-08-31 PROCEDURE — 97014 ELECTRIC STIMULATION THERAPY: CPT

## 2020-08-31 PROCEDURE — 97112 NEUROMUSCULAR REEDUCATION: CPT

## 2020-08-31 NOTE — PROGRESS NOTES
Today's date: 2020  Patient name: Corinna Keys  : 1950  MRN: [id number]  Referring provider: Ericka Alvarez  Dx:   Encounter Diagnosis     ICD-10-CM    1  Aftercare following surgery  Z48 89    2  Acute pain of left shoulder  M25 512      Subjective:  No new c/o pain today  "I think my dr is going to really pleased with my L shoulder progress "    Objective: See treatment log below  Rani Musa continues to be advised to follow his home exercise program as tolerated  When Rani Musa is feeling good he follows his rehab exercises in the gym and on other days he follows his home exercise program at home as tolerated  In the future we plan on having Bjorn stay at home and follow his home exercise program for four to six weeks and than assess for either more Rehab treatments or discharge from Rehab care  Assessment: Tolerated treatment well  Patient exhibited good technique with therapeutic exercises and would benefit from continued PT to increase L shoulder ROM/strength and endurance to improve mobility  Bjorn's goals are to continue to improve with his rehab program and improve with functional mobility, speed, repetition and decreased c/o pain with his gym and home exercise program   Bjorn's final goal for his rehab is to be discharged from Rehab care after obtaining his full functional rehab potential     Plan: Continue per plan of care  Progress treament per protocol  Precautions:  Left Shoulder Surgery Rotator Cuff Repair    All treatments below will be provided with a focus on strengthening, flexibility, ROM, postural,   endurance and any possible swelling and pain which may be present without ignoring   neural issues involving balance, coordination and proprioception which is also important   and necessary to provide full functional mobility and quality care        Daily Treatment Log  Manual     MT, ROM 23 30' 30' 30' 30'   HEP        Exercise Log 8/21 8/24 8/27 8/28 8/31   [facility]        [employer]        Power Web        Digiflex        Finger Ladder        Maria R-BP,PD,Lats,Row        NuStep  10' L7 10' L7 10' L7 10' L9   W/P-PNF,IR,ER,PU,PS,Throw-Top,Mid,Bot 10#-30x 12 5# Top  5# Mid 30x 12 5#-30x 12 5#-30x 15#Top  10#Mid  30x   W/P-OH 10'x2 2x10' 10'x2 10'x2 10'   Rotation Bar Sup/Pro 30x NT 30x 30x NT   Randnina        [place], [place] 77' 15' 15' 15' 15'           Modalities 8/21 8/24 8/27 8/28 8/31   MH&ES   20' 20' 15'   US

## 2020-09-01 ENCOUNTER — OFFICE VISIT (OUTPATIENT)
Dept: PHYSICAL THERAPY | Facility: CLINIC | Age: 70
End: 2020-09-01
Payer: MEDICARE

## 2020-09-01 DIAGNOSIS — Z48.89 AFTERCARE FOLLOWING SURGERY: Primary | ICD-10-CM

## 2020-09-01 DIAGNOSIS — M25.512 ACUTE PAIN OF LEFT SHOULDER: ICD-10-CM

## 2020-09-01 PROCEDURE — 97014 ELECTRIC STIMULATION THERAPY: CPT

## 2020-09-01 PROCEDURE — 97112 NEUROMUSCULAR REEDUCATION: CPT

## 2020-09-01 PROCEDURE — 97140 MANUAL THERAPY 1/> REGIONS: CPT

## 2020-09-01 NOTE — PROGRESS NOTES
Today's date: 2020  Patient name: Corinna Keys  : 1950  MRN: [id number]  Referring provider: Ericka Alvarez  Dx:   Encounter Diagnosis     ICD-10-CM    1  Aftercare following surgery  Z48 89    2  Acute pain of left shoulder  M25 512      Subjective:  No new c/o pain today  Objective: See treatment log below  Rani Musa continues to be advised to follow his home exercise program as tolerated  When Rani Musa is feeling good he follows his rehab exercises in the gym and on other days he follows his home exercise program at home as tolerated  In the future we plan on having Bjorn stay at home and follow his home exercise program for four to six weeks and than assess for either more Rehab treatments or discharge from Rehab care  Assessment: Tolerated treatment well  Patient exhibited good technique with therapeutic exercises and would benefit from continued PT to increase L shoulder ROM/strength and endurance to improve mobility  Bjorn's goals are to continue to improve with his rehab program and improve with functional mobility, speed, repetition and decreased c/o pain with his gym and home exercise program   Bjorn's final goal for his rehab is to be discharged from Rehab care after obtaining his full functional rehab potential     Plan: Continue per plan of care  Progress treatment as tolerated  Precautions:  Left Shoulder Surgery Rotator Cuff Repair    All treatments below will be provided with a focus on strengthening, flexibility, ROM, postural,   endurance and any possible swelling and pain which may be present without ignoring   neural issues involving balance, coordination and proprioception which is also important   and necessary to provide full functional mobility and quality care        Daily Treatment Log  Manual     MT, ROM 30'    30'   HEP        Exercise Log    [facility]        [employer]        [employer]        Digiflex Finger Ladder        Maria R-BP,PD,Lats,Row 35#Lats,PD,Row  15#BP 30x       NuStep 10' L9    10' L9   W/P-PNF,IR,ER,PU,PS,Throw-Top,Mid,Bot 15# 30x    15#Top  10#Mid  30x   W/P-OH 10'x2    8'   Rotation Bar Sup/Pro 30x    NT   [facility]        [address] [place], [place] 40'    15'           [facility] 9/1 8/31   MH&ES 25'    15'

## 2020-09-03 ENCOUNTER — APPOINTMENT (OUTPATIENT)
Dept: PHYSICAL THERAPY | Facility: CLINIC | Age: 70
End: 2020-09-03
Payer: MEDICARE

## 2020-09-04 ENCOUNTER — OFFICE VISIT (OUTPATIENT)
Dept: PHYSICAL THERAPY | Facility: CLINIC | Age: 70
End: 2020-09-04
Payer: MEDICARE

## 2020-09-04 DIAGNOSIS — Z48.89 AFTERCARE FOLLOWING SURGERY: Primary | ICD-10-CM

## 2020-09-04 DIAGNOSIS — M25.512 ACUTE PAIN OF LEFT SHOULDER: ICD-10-CM

## 2020-09-04 PROCEDURE — 97014 ELECTRIC STIMULATION THERAPY: CPT

## 2020-09-04 PROCEDURE — 97140 MANUAL THERAPY 1/> REGIONS: CPT

## 2020-09-04 PROCEDURE — 97112 NEUROMUSCULAR REEDUCATION: CPT

## 2020-09-04 NOTE — PROGRESS NOTES
Today's date: 2020  Patient name: Elva Farias  : 1950  MRN: [id number]  Referring provider: Ericka Song  Dx:   Encounter Diagnosis     ICD-10-CM    1  Aftercare following surgery  Z48 89    2  Acute pain of left shoulder  M25 512    3  Aftercare following right knee joint replacement surgery  Z47 1     Z96 651    4  Difficulty walking  R26 2    5  Acute pain of right knee  M25 561      Subjective:  No new c/o pain today  Objective: See treatment log below  Luke Adams continues to be advised to follow his home exercise program as tolerated  When Luke Adams is feeling good he follows his rehab exercises in the gym and on other days he follows his home exercise program at home as tolerated  In the future we plan on having Bjorn stay at home and follow his home exercise program for four to six weeks and than assess for either more Rehab treatments or discharge from Rehab care  Assessment: Tolerated treatment well  Patient exhibited good technique with therapeutic exercises and would benefit from continued PT to increase L shoulder ROM/strength and endurance to improve mobility  Bjorn's goals are to continue to improve with his rehab program and improve with functional mobility, speed, repetition and decreased c/o pain with his gym and home exercise program   Bjorn's final goal for his rehab is to be discharged from Rehab care after obtaining his full functional rehab potential     Plan: Continue per plan of care  Progress treatment as tolerated  Precautions:  Left Shoulder Surgery Rotator Cuff Repair    All treatments below will be provided with a focus on strengthening, flexibility, ROM, postural,   endurance and any possible swelling and pain which may be present without ignoring   neural issues involving balance, coordination and proprioception which is also important   and necessary to provide full functional mobility and quality care        Daily Treatment Log  Manual 9/1 9/4      MT, ROM 30' 30'      HEP        Exercise Log 9/1 9/4      [facility]        [facility]        [employer]        Power Web        Digiflex        Finger Ladder        Maria R-BP,PD,Lats,Row 35#Lats,PD,Row  15#BP 30x 35#Lats,PD,Row  15#BP 30x      NuStep 10' L9 10' L9      W/P-PNF,IR,ER,PU,PS,Throw-Top,Mid,Bot 15# 30x 15# 30x      W/P-OH 10'x2 10'x2      Rotation Bar Sup/Pro 30x 30x      [employer]Y  [address] 2x30 2x30      [place], [place] 15' 15'              Modalities 9/1 9/4      MH&ES 25' 20'      US

## 2020-09-08 ENCOUNTER — OFFICE VISIT (OUTPATIENT)
Dept: PHYSICAL THERAPY | Facility: CLINIC | Age: 70
End: 2020-09-08
Payer: MEDICARE

## 2020-09-08 DIAGNOSIS — M25.512 ACUTE PAIN OF LEFT SHOULDER: ICD-10-CM

## 2020-09-08 DIAGNOSIS — Z48.89 AFTERCARE FOLLOWING SURGERY: Primary | ICD-10-CM

## 2020-09-08 PROCEDURE — 97140 MANUAL THERAPY 1/> REGIONS: CPT

## 2020-09-08 PROCEDURE — 97112 NEUROMUSCULAR REEDUCATION: CPT

## 2020-09-08 NOTE — PROGRESS NOTES
Today's date: 2020  Patient name: Melanie Phillip  : 1950  MRN: [id number]  Referring provider: Ericka Justice  Dx:   Encounter Diagnosis     ICD-10-CM    1  Aftercare following surgery  Z48 89    2  Acute pain of left shoulder  M25 512      Subjective:  No new c/o pain today  Objective: See treatment log below  Chloe Hughes continues to be advised to follow his home exercise program as tolerated  When Chloe Hughes is feeling good he follows his rehab exercises in the gym and on other days he follows his home exercise program at home as tolerated  In the future we plan on having Bjorn stay at home and follow his home exercise program for four to six weeks and than assess for either more Rehab treatments or discharge from Rehab care  Assessment: Tolerated treatment well  Patient exhibited good technique with therapeutic exercises and would benefit from continued PT to increase L shoulder ROM/strength and endurance to improve mobility  Bjorn's goals are to continue to improve with his rehab program and improve with functional mobility, speed, repetition and decreased c/o pain with his gym and home exercise program   Bjorn's final goal for his rehab is to be discharged from Rehab care after obtaining his full functional rehab potential     Plan: Continue per plan of care  Progress treatment as tolerated  Precautions:  Left Shoulder Surgery Rotator Cuff Repair    All treatments below will be provided with a focus on strengthening, flexibility, ROM, postural,   endurance and any possible swelling and pain which may be present without ignoring   neural issues involving balance, coordination and proprioception which is also important   and necessary to provide full functional mobility and quality care        Daily Treatment Log  Manual       MT, ROM 30' 30' 30'     HEP        Exercise Log      [facility]        [facility] [employer]        Finger Ladder        Maria R-BP,PD,Lats,Row 35#Lats,PD,Row  15#BP 30x 35#Lats,PD,Row  15#BP 30x 35#Lats 30x     NuStep 10' L9 10' L9 NT     W/P-PNF,IR,ER,PU,PS,Throw-Top,Mid,Bot 15# 30x 15# 30x 17 5# Top  30x     W/P-OH 10'x2 10'x2 2x30     Rotation Bar Sup/Pro 30x 30x NT     WallSlidesITVY  2x30 [address]  [place]arl [place] 2x30 2x30 2x30     [place], [place] 15' 15' 15'             Modalities 9/1 9/4 9/8     MH&ES 25' 20' [place] [address]

## 2020-09-10 ENCOUNTER — OFFICE VISIT (OUTPATIENT)
Dept: PHYSICAL THERAPY | Facility: CLINIC | Age: 70
End: 2020-09-10
Payer: MEDICARE

## 2020-09-10 DIAGNOSIS — M25.512 ACUTE PAIN OF LEFT SHOULDER: ICD-10-CM

## 2020-09-10 DIAGNOSIS — Z48.89 AFTERCARE FOLLOWING SURGERY: Primary | ICD-10-CM

## 2020-09-10 PROCEDURE — 97140 MANUAL THERAPY 1/> REGIONS: CPT

## 2020-09-10 PROCEDURE — 97112 NEUROMUSCULAR REEDUCATION: CPT

## 2020-09-10 NOTE — PROGRESS NOTES
Today's date: 9/10/2020  Patient name: Lyric Waller  : 1950  MRN: [id number]  Referring provider: Ericka Zabala  Dx:   Encounter Diagnosis     ICD-10-CM    1  Aftercare following surgery  Z48 89    2  Acute pain of left shoulder  M25 512      Subjective:  "I'm really pleased with my progress "    Objective: See treatment log below  Benson Hunter continues to be advised to follow his home exercise program as tolerated  When Benson Hunter is feeling good he follows his rehab exercises in the gym and on other days he follows his home exercise program at home as tolerated  In the future we plan on having Bjorn stay at home and follow his home exercise program for four to six weeks and than assess for either more Rehab treatments or discharge from Rehab care  Assessment: Tolerated treatment well  Patient exhibited good technique with therapeutic exercises and would benefit from continued PT to increase L shoulder ROM/strength and endurance to improve mobility  Bjorn's goals are to continue to improve with his rehab program and improve with functional mobility, speed, repetition and decreased c/o pain with his gym and home exercise program   Bjorn's final goal for his rehab is to be discharged from Rehab care after obtaining his full functional rehab potential     Plan: Continue per plan of care  Progress treatment as tolerated  Precautions:  Left Shoulder Surgery Rotator Cuff Repair    All treatments below will be provided with a focus on strengthening, flexibility, ROM, postural,   endurance and any possible swelling and pain which may be present without ignoring   neural issues involving balance, coordination and proprioception which is also important   and necessary to provide full functional mobility and quality care        Daily Treatment Log  Manual  9/1 9/4 9/8 9/10    MT, ROM 30' 30' 30' 15'    HEP        Exercise Log 9/1 9/4 9/8 9/10    [facility]        [employer]        Digiflex        Finger Ladder        Maria R-BP,PD,Lats,Row 35#Lats,PD,Row  15#BP 30x 35#Lats,PD,Row  15#BP 30x 35#Lats 30x 35# 30x    NuStep 10' L9 10' L9 NT 10' L9    W/P-PNF,IR,ER,PU,PS,Throw-Top,Mid,Bot 15# 30x 15# 30x 17 5# Top  30x 22 5# Top  12 5# Mid  30x    W/P-OH 10'x2 10'x2 2x30 2x30    Rotation Bar Sup/Pro 30x 30x NT 30x    WallSlidesITVY  2x30 2x30 [address] [facility] 2x30 2x30 2x30 2x30    [place], [place] 15' 15' 15' 15'            Modalities 9/1 9/4 9/8 9/10    MH&ES 25' 20' [address] [facility]

## 2020-09-11 ENCOUNTER — OFFICE VISIT (OUTPATIENT)
Dept: PHYSICAL THERAPY | Facility: CLINIC | Age: 70
End: 2020-09-11
Payer: MEDICARE

## 2020-09-11 DIAGNOSIS — Z96.651 AFTERCARE FOLLOWING RIGHT KNEE JOINT REPLACEMENT SURGERY: ICD-10-CM

## 2020-09-11 DIAGNOSIS — M25.561 ACUTE PAIN OF RIGHT KNEE: ICD-10-CM

## 2020-09-11 DIAGNOSIS — M25.512 ACUTE PAIN OF LEFT SHOULDER: ICD-10-CM

## 2020-09-11 DIAGNOSIS — R26.2 DIFFICULTY WALKING: ICD-10-CM

## 2020-09-11 DIAGNOSIS — Z48.89 AFTERCARE FOLLOWING SURGERY: Primary | ICD-10-CM

## 2020-09-11 DIAGNOSIS — Z47.1 AFTERCARE FOLLOWING RIGHT KNEE JOINT REPLACEMENT SURGERY: ICD-10-CM

## 2020-09-11 PROCEDURE — 97112 NEUROMUSCULAR REEDUCATION: CPT | Performed by: PHYSICAL THERAPIST

## 2020-09-11 PROCEDURE — 97164 PT RE-EVAL EST PLAN CARE: CPT | Performed by: PHYSICAL THERAPIST

## 2020-09-11 PROCEDURE — 97140 MANUAL THERAPY 1/> REGIONS: CPT | Performed by: PHYSICAL THERAPIST

## 2020-09-11 PROCEDURE — 97110 THERAPEUTIC EXERCISES: CPT | Performed by: PHYSICAL THERAPIST

## 2020-09-11 NOTE — LETTER
2020  PT Reevaluation 15 [facility] [[email or web address]] CRISTAL Craig  [address]  [address] [facility]  [address]    Patient: Junie Palumbo  YOB: 1950   Date of Visit: 2020     Encounter Diagnosis     ICD-10-CM    1  Aftercare following surgery  Z48 89    2  Acute pain of left shoulder  M25 512    3  Aftercare following right knee joint replacement surgery  Z47 1     Z96 651    4  Difficulty walking  R26 2    5  Acute pain of right knee  M25 561      Dear Dr Jeana Galvez: Thank you for your recent referral of Junie Palumbo    Please review the attached evaluation summary from Bjorn's recent visit  Please verify that you agree with the plan of care by signing the attached order  If you have any questions or concerns, please do not hesitate to call  I sincerely appreciate the opportunity to share in the care of one of your patients and hope to have another opportunity to work with you in the near future  Sincerely,    Sarah Tinoco, PT    Referring Provider:   I certify that I have read the below Plan of Care and certify the need for these services furnished under this plan of treatment while under my care  Jo Pickard PA-C  [address]  [address] [facility]  [address]  VIA Facsimile: [phone]    Please SIGN ABOVE and return THIS PAGE ONLY to Fax # [phone]          Today's date: 2020  Patient name: Junie Palumbo  : 1950  MRN: [id number]  Referring provider: Ericka Anderson  Dx:   Encounter Diagnosis     ICD-10-CM    1  Aftercare following surgery  Z48 89    2  Acute pain of left shoulder  M25 512    3  Aftercare following right knee joint replacement surgery  Z47 1     Z96 651    4  Difficulty walking  R26 2    5  Acute pain of right knee  M25 561      Subjective:  Maricel Marquez states his left shoulder is getting better  He is developing more power  Objective: See treatment log below    Maricel Marquez continues to be advised to follow his home exercise program as tolerated  When Maru Knight is feeling good he follows his rehab exercises in the gym and on other days he follows his home exercise program at home as tolerated  In the future we plan on having Bjorn stay at home and follow his home exercise program for four to six weeks and than assess for either more Rehab treatments or discharge from Rehab care  Assessment: Tolerated treatment well  Patient exhibited good technique with therapeutic exercises and would benefit from continued PT  Bjorn's goals are to continue to improve with his rehab program and improve with functional mobility, speed, repetition and decreased c/o pain with his gym and home exercise program   Bjorn's final goal for his rehab is to be discharged from Rehab care after obtaining his full functional rehab potential     Plan: Continue per plan of care  Progress treatment as tolerated  Precautions:  Left Shoulder Surgery Rotator Cuff Repair    All treatments below will be provided with a focus on strengthening, flexibility, ROM, postural,   endurance and any possible swelling and pain which may be present without ignoring   neural issues involving balance, coordination and proprioception which is also important   and necessary to provide full functional mobility and quality care        Daily Treatment Log  Manual  9/1 9/4 9/8 9/10 9/11   MT, ROM 30' 30' 30' 15' 15'   HEP        Exercise Log 9/1 9/4 9/8 9/10 9/11   [facility]        Power Web        Digiflex        Finger Ladder        Maria R-BP,PD,Lats,Row 35#Lats,PD,Row  15#BP 30x 35#Lats,PD,Row  15#BP 30x 35#Lats 30x 35# 30x 35# 30x   NuStep 10' L9 10' L9 NT 10' L9 10' L9   W/P-PNF,IR,ER,PU,PS,Throw-Top,Mid,Bot 15# 30x 15# 30x 17 5# Top  30x 22 5# Top  12 5# Mid  30x 22 5# Top  12 5# Mid  30x     W/P-OH 10'x2 10'x2 2x30 2x30 2x30   Rotation Bar Sup/Pro 30x 30x NT 30x 30x   WallSlidesITVY  2x30 2x30 2x30 2x30   Ball Wall Rolls 2x30 2x30 2x30 2x30 2x30   ME, PE 15' 15' 15' 15' 15'           Modalities 9/1 9/4 9/8 9/10 9/11   MH&ES 25' 20' 15'MH 15'MH 15'               PT Re-Evaluation   Today's date: 2020    Patient name: Demi Antunez  : 1950  MRN: [id number]  Referring provider: Ericka Donnelly  Dx:   Encounter Diagnosis     ICD-10-CM    1  Aftercare following surgery  Z48 89    2  Acute pain of left shoulder  M25 512    3  Aftercare following right knee joint replacement surgery  Z47 1     Z96 651    4  Difficulty walking  R26 2    5  Acute pain of right knee  M25 561      Assessment  Assessment details: Patient is doing well  He is getting stronger and has more movement  He can perform more chores with fewer rests  Impairments: abnormal or restricted ROM, abnormal movement, activity intolerance, impaired physical strength, pain with function, safety issue and scapular dyskinesis  Understanding of Dx/Px/POC: excellent   Prognosis: good    Goals  STG 2-4 weeks:   Increase UE strength by 3-6 lbs  Decrease pain by 1-2 levels on 1-10 pain scale  Increase UE PROM by 10-15 Degrees in all planes  Reduce C/O pain with lying on either side  Initiate HEP  LTG 6-8 weeks:   Increase UE strength 10-20 lbs  Demonstrate UE AROM WFL in all planes  Decrease pain to <2  Improve strength by 10-20 lbs  Return to lying on their right or left side with minimal difficulty  Improve sleep status limitations to none  D/C with HEP  Plan  Plan details: All planned modality interventions and planned therapy interventions are provided PRN    Patient would benefit from: PT eval and skilled physical therapy  Planned modality interventions: ultrasound, unattended electrical stimulation and TENS  Planned therapy interventions: joint mobilization, manual therapy, neuromuscular re-education, patient education, postural training, self care, strengthening, stretching, therapeutic activities, therapeutic exercise, therapeutic training, home exercise program, graded exercise, flexibility and coordination  Frequency: 3x week  Duration in weeks: 12  Treatment plan discussed with: patient      Subjective Evaluation    Pain  Quality: discomfort, knife-like, pulling, squeezing, sharp and tight  Relieving factors: support, rest, relaxation and change in position  Aggravating factors: lifting and overhead activity  Progression: improved    Treatments  Previous treatment: physical therapy  Current treatment: physical therapy  Patient Goals  Patient goals for therapy: decreased pain, increased motion, return to work, return to [employer] activities, independence with ADLs/IADLs and increased strength      Date of onset:  2/15/2020    Date of Surgery:  7/7/2020    History of Present Episode: 8/7/2020 2000 [facility] states he has had left shoulder issues for many years  He just received surgery around 7/7/2020  Past Medical History:    8/7/2020 2000 [facility] reports R TKR  Left shoulder issues  Neck Issues  Neck surgery C3-5 fused  Previous Level of Functional Ability:  8/7/2020 2000 [facility] states his left shoulder was getting worse and he finally went for surgery  Inspection / Palpation:  Shoulder:  8/7/2020  Mesomorphic / Endomorphic body type  No signs of infection  No signs of wounds  No signs of drainage  No signs of ecchymotic regions  No signs of erythremic regions  Mild to moderate signs of muscle spasm  Mild to moderate signs of muscle guarding  Mild to moderate signs of tenderness reported to palpation  No signs of swelling  Positive signs of a surgery site  Current conditions appears consistent with recent acute recovery from surgery to his left shoulder  Chief Complaints:  8/7/2020  Bjorn reports moderate difficulty with bending his left shoulder  2000 [facility] reports moderate difficulty with movement of his left shoulder  [facility] reports moderate to severe difficulty with use of his left arm    [facility] reports moderate to severe difficulty with lifting / elevating his left arm  Ashley Gross reports moderate to severe difficulty with sleeping  Ashley Gross reports moderate to severe difficulty with his strength and endurance  Ashley Gross reports moderate limitations with his left shoulder range of motion  Ashley Gross reports severe difficulty lying on his left shoulder region      SHOULDER PAIN Resting Palpation Moving Lifting Elevating   8/7/2020 Rt 0 0 0 0 0   8/7/2020 Lt  2-6 2-6 2-6 5-8 2-6   8/31/2020 Lt 1-5 1-5 1-5 4-7 2-5     SHOULDER PAIN Sleeping Throwing Pushing Pulling Twisting   8/7/2020 Rt 0 0 0 0 0   8/7/2020 Lt  2-6 NA 2-6 2-6 5-8   8/31/2020 Lt 2-5 NA 2-5 2-5 4-7     SHOULDER AROM Flexion Extension Abduction   8/7/2020 Rt 180° 65° 180°   8/7/2020 Lt 165° 51° 165°   8/31/2020 Lt 174° 58° 174°     SHOULDER AROM Hor Add Ext Rotation Int Rotation   8/7/2020 Rt 45° 98° 95°   8/7/2020 Lt 5° 78° 82°   8/31/2020 Lt 15° 84° 86°     SHLD MMT / PAIN Flexion Extension Abduction   8/7/2020 Rt 0/10  52 lbs 0/10  58 lbs 0/10  51 lbs   8/7/2020 Lt 2/10  32 lbs 2/10  33 lbs 5/10  15 lbs   8/31/2020 Lt 1/10  36 lbs 1/10  36 lbs 4/10  19 lbs     SHLD MMT / PAIN Hor Add Ext Rotation Int Rotation   8/7/2020 Rt 0/10  54 lbs 0/10  49 lbs 0/10  56 lbs   8/7/2020 Lt 2/10  21 lbs 5/10  15 lbs 4/10  16 lbs   8/31/2020 Lt 1/10  25 lbs 4/10  19 lbs 3/10  19 lbs     Shoulder Screen Rotator Cuff Apprehension Anterior  Stability Posterior  Stability   8/7/2020 Rt Negative Negative Negative Negative   8/7/2020 Lt Negative Negative Negative Negative     Shoulder Screen AC Joint SC Joint Drop Arm Adhesive Capsulitis   8/7/2020 Rt Negative Negative Negative Negative   8/7/2020 Lt Negative Negative Negative Negative     Precautions:  Left Shoulder Surgery Rotator Cuff Repair

## 2020-09-11 NOTE — PROGRESS NOTES
Today's date: 2020  Patient name: Matthew Hatchet  : 1950  MRN: [id number]  Referring provider: Lizzy Boyd  Dx:   Encounter Diagnosis     ICD-10-CM    1  Aftercare following surgery  Z48 89    2  Acute pain of left shoulder  M25 512    3  Aftercare following right knee joint replacement surgery  Z47 1     Z96 651    4  Difficulty walking  R26 2    5  Acute pain of right knee  M25 561      Subjective:  Azalea Sosa states his left shoulder is getting better  He is developing more power  Objective: See treatment log below  Azalea Sosa continues to be advised to follow his home exercise program as tolerated  When Azalea Sosa is feeling good he follows his rehab exercises in the gym and on other days he follows his home exercise program at home as tolerated  In the future we plan on having Bjorn stay at home and follow his home exercise program for four to six weeks and than assess for either more Rehab treatments or discharge from Rehab care  Assessment: Tolerated treatment well  Patient exhibited good technique with therapeutic exercises and would benefit from continued PT  Bjorn's goals are to continue to improve with his rehab program and improve with functional mobility, speed, repetition and decreased c/o pain with his gym and home exercise program   Bjorn's final goal for his rehab is to be discharged from Rehab care after obtaining his full functional rehab potential     Plan: Continue per plan of care  Progress treatment as tolerated  Precautions:  Left Shoulder Surgery Rotator Cuff Repair    All treatments below will be provided with a focus on strengthening, flexibility, ROM, postural,   endurance and any possible swelling and pain which may be present without ignoring   neural issues involving balance, coordination and proprioception which is also important   and necessary to provide full functional mobility and quality care        Daily Treatment Log  Manual   9/8 9/10 9/11   MT, ROM 30' 30' 30' 15' 15'   HEP        Exercise Log 9/1 9/4 9/8 9/10 9/11   [facility]        [facility]        Power Web        Digiflex        Finger Ladder        Maria R-BP,PD,Lats,Row 35#Lats,PD,Row  15#BP 30x 35#Lats,PD,Row  15#BP 30x 35#Lats 30x 35# 30x 35# 30x   NuStep 10' L9 10' L9 NT 10' L9 10' L9   W/P-PNF,IR,ER,PU,PS,Throw-Top,Mid,Bot 15# 30x 15# 30x 17 5# Top  30x 22 5# Top  12 5# Mid  30x 22 5# Top  12 5# Mid  30x     W/P-OH 10'x2 10'x2 2x30 2x30 2x30   Rotation Bar Sup/Pro 30x 30x NT 30x 30x   WallSlidesITVY  2x30 2x30 2x30 2x30   Ball Wall Rolls 2x30 2x30 2x30 2x30 2x30   ME, PE 15' 15' 15' 15' 15'           Modalities 9/1 9/4 9/8 9/10 9/11   MH&ES 25' 20' 15'MH 15'MH 15'   US

## 2020-09-15 ENCOUNTER — OFFICE VISIT (OUTPATIENT)
Dept: PHYSICAL THERAPY | Facility: CLINIC | Age: 70
End: 2020-09-15
Payer: MEDICARE

## 2020-09-15 DIAGNOSIS — M25.512 ACUTE PAIN OF LEFT SHOULDER: ICD-10-CM

## 2020-09-15 DIAGNOSIS — Z48.89 AFTERCARE FOLLOWING SURGERY: Primary | ICD-10-CM

## 2020-09-15 PROCEDURE — 97140 MANUAL THERAPY 1/> REGIONS: CPT

## 2020-09-15 PROCEDURE — 97112 NEUROMUSCULAR REEDUCATION: CPT

## 2020-09-15 NOTE — PROGRESS NOTES
Today's date: 9/15/2020  Patient name: Melanie Phillip  : 1950  MRN: [id number]  Referring provider: Ericka Justice  Dx:   Encounter Diagnosis     ICD-10-CM    1  Aftercare following surgery  Z48 89    2  Acute pain of left shoulder  M25 512      Subjective:  No new c/o pain today  Objective: See treatment log below  Chloe Hughes continues to be advised to follow his home exercise program as tolerated  When Chloe Hughes is feeling good he follows his rehab exercises in the gym and on other days he follows his home exercise program at home as tolerated  In the future we plan on having Bjorn stay at home and follow his home exercise program for four to six weeks and than assess for either more Rehab treatments or discharge from Rehab care  Assessment: Tolerated treatment well  Patient exhibited good technique with therapeutic exercises and would benefit from continued PT  Bjorn's goals are to continue to improve with his rehab program and improve with functional mobility, speed, repetition and decreased c/o pain with his gym and home exercise program   Bjorn's final goal for his rehab is to be discharged from Rehab care after obtaining his full functional rehab potential     Plan: Continue per plan of care  Progress treatment as tolerated  Precautions:  Left Shoulder Surgery Rotator Cuff Repair    All treatments below will be provided with a focus on strengthening, flexibility, ROM, postural,   endurance and any possible swelling and pain which may be present without ignoring   neural issues involving balance, coordination and proprioception which is also important   and necessary to provide full functional mobility and quality care        Daily Treatment Log  Manual  9/15    9/11   MT, ROM 30'    15'   HEP        Exercise Log 9/15    9/11   [facility]        Power Web        Digiflex        Finger Ladder        Maria R-BP,PD,Lats,Row 65#Lats,PD,Row,BP 30x    35# 30x   NuStep 10' L9    10' L9   W/P-PNF,IR,ER,PU,PS,Throw-Top,Mid,Bot 25#Top  10#Mid 30x    22 5# Top  12 5# Mid  30x     W/P-OH 2x30    2x30   Rotation Bar Sup/Pro     30x   WallSlidesITVY 2x30    [address] [facility] 2x30    2x30   Felisa Wilkinson 15'    15'           [facility] 9/15    9/11   &ES 20'MH    15'

## 2020-09-17 ENCOUNTER — TRANSCRIBE ORDERS (OUTPATIENT)
Dept: PHYSICAL THERAPY | Facility: CLINIC | Age: 70
End: 2020-09-17

## 2020-09-17 ENCOUNTER — OFFICE VISIT (OUTPATIENT)
Dept: PHYSICAL THERAPY | Facility: CLINIC | Age: 70
End: 2020-09-17
Payer: MEDICARE

## 2020-09-17 DIAGNOSIS — Z47.1 AFTERCARE FOLLOWING RIGHT KNEE JOINT REPLACEMENT SURGERY: ICD-10-CM

## 2020-09-17 DIAGNOSIS — M25.512 ACUTE PAIN OF LEFT SHOULDER: ICD-10-CM

## 2020-09-17 DIAGNOSIS — Z96.651 AFTERCARE FOLLOWING RIGHT KNEE JOINT REPLACEMENT SURGERY: ICD-10-CM

## 2020-09-17 DIAGNOSIS — Z48.89 AFTERCARE FOLLOWING SURGERY: Primary | ICD-10-CM

## 2020-09-17 PROCEDURE — 97110 THERAPEUTIC EXERCISES: CPT | Performed by: PHYSICAL THERAPIST

## 2020-09-17 PROCEDURE — 97112 NEUROMUSCULAR REEDUCATION: CPT | Performed by: PHYSICAL THERAPIST

## 2020-09-17 PROCEDURE — 97140 MANUAL THERAPY 1/> REGIONS: CPT | Performed by: PHYSICAL THERAPIST

## 2020-09-17 NOTE — PROGRESS NOTES
PT Re-Evaluation   Today's date: 2020     Patient name: Freddy Puentes  : 1950  MRN: [id number]  Referring provider: Ericka Pena  Dx:   Encounter Diagnosis     ICD-10-CM    1  Aftercare following surgery  Z48 89    2  Acute pain of left shoulder  M25 512      Assessment  Assessment details: Patient is making progress with range of motion and strength  Impairments: abnormal or restricted ROM, abnormal movement, activity intolerance, impaired physical strength, pain with function, safety issue and scapular dyskinesis  Understanding of Dx/Px/POC: excellent   Prognosis: good    Goals  All planned modality interventions and planned therapy interventions are provided PRN  Plan  Plan details: All planned modality interventions and planned therapy interventions are provided PRN    Patient would benefit from: PT eval and skilled physical therapy  Planned modality interventions: unattended electrical stimulation  Planned therapy interventions: joint mobilization, manual therapy, neuromuscular re-education, patient education, postural training, self care, strengthening, stretching, therapeutic activities, therapeutic exercise, therapeutic training, home exercise program, graded exercise, gait training, flexibility and coordination  Frequency: 3x week  Duration in weeks: 12  Treatment plan discussed with: patient      Subjective Evaluation    Pain  Quality: discomfort, knife-like, pulling, squeezing, sharp and tight  Relieving factors: support, rest, relaxation and change in position  Aggravating factors: lifting and overhead activity  Progression: improved    Treatments  Previous treatment: physical therapy  Current treatment: physical therapy  Patient Goals  Patient goals for therapy: decreased pain, increased motion, return to work, return to [employer] activities, independence with ADLs/IADLs and increased strength      Date of onset:  2/15/2020    Date of Surgery:  2020    History of Present Episode: 8/7/2020  Channing Case states he has had left shoulder issues for many years  He just received surgery around 7/7/2020  Past Medical History:    8/7/2020  Channing Case reports R TKR  Left shoulder issues  Neck Issues  Neck surgery C3-5 fused  Previous Level of Functional Ability:  8/7/2020  Channing Case states his left shoulder was getting worse and he finally went for surgery  Inspection / Palpation:  Shoulder:  8/7/2020  Mesomorphic / Endomorphic body type  No signs of infection  No signs of wounds  No signs of drainage  No signs of ecchymotic regions  No signs of erythremic regions  Mild to moderate signs of muscle spasm  Mild to moderate signs of muscle guarding  Mild to moderate signs of tenderness reported to palpation  No signs of swelling  Positive signs of a surgery site  Current conditions appears consistent with recent acute recovery from surgery to his left shoulder  Chief Complaints:  8/7/2020  Bjorn reports moderate difficulty with bending his left shoulder  Channing Case reports moderate difficulty with movement of his left shoulder  Channing Edwards reports moderate to severe difficulty with use of his left arm  Lawton Edwards reports moderate to severe difficulty with lifting / elevating his left arm  Channing Case reports moderate to severe difficulty with sleeping  Channing Case reports moderate to severe difficulty with his strength and endurance  Channing Case reports moderate limitations with his left shoulder range of motion  Channing Case reports severe difficulty lying on his left shoulder region      SHOULDER PAIN Resting Palpation Moving Lifting Elevating   8/7/2020 Rt 0 0 0 0 0   8/7/2020 Lt  2-6 2-6 2-6 5-8 2-6   8/31/2020 Lt 1-5 1-5 1-5 4-7 2-5     SHOULDER PAIN Sleeping Throwing Pushing Pulling Twisting   8/7/2020 Rt 0 0 0 0 0   8/7/2020 Lt  2-6 NA 2-6 2-6 5-8   8/31/2020 Lt 2-5 NA 2-5 2-5 4-7     SHOULDER AROM Flexion Extension Abduction   8/7/2020 Rt 180° 65° 180°   8/7/2020 Lt 165° 51° 165° 8/31/2020 Lt 174° 58° 174°     SHOULDER AROM Hor Add Ext Rotation Int Rotation   8/7/2020 Rt 45° 98° 95°   8/7/2020 Lt 5° 78° 82°   8/31/2020 Lt 15° 84° 86°     SHLD MMT / PAIN Flexion Extension Abduction   8/7/2020 Rt 0/10  52 lbs 0/10  58 lbs 0/10  51 lbs   8/7/2020 Lt 2/10  32 lbs 2/10  33 lbs 5/10  15 lbs   8/31/2020 Lt 1/10  36 lbs 1/10  36 lbs 4/10  19 lbs     SHLD MMT / PAIN Hor Add Ext Rotation Int Rotation   8/7/2020 Rt 0/10  54 lbs 0/10  49 lbs 0/10  56 lbs   8/7/2020 Lt 2/10  21 lbs 5/10  15 lbs 4/10  16 lbs   8/31/2020 Lt 1/10  25 lbs 4/10  19 lbs 3/10  19 lbs     Shoulder Screen Rotator Cuff Apprehension Anterior  Stability Posterior  Stability   8/7/2020 Rt Negative Negative Negative Negative   8/7/2020 Lt Negative Negative Negative Negative     Shoulder Screen AC Joint SC Joint Drop Arm Adhesive Capsulitis   8/7/2020 Rt Negative Negative Negative Negative   8/7/2020 Lt Negative Negative Negative Negative     Precautions:  Left Shoulder Surgery Rotator Cuff Repair

## 2020-09-17 NOTE — PROGRESS NOTES
Today's date: 2020  Patient name: Fredy Murillo  : 1950  MRN: [id number]  Referring provider: Ericka Dyer  Dx:   Encounter Diagnosis     ICD-10-CM    1  Aftercare following surgery  Z48 89    2  Acute pain of left shoulder  M25 512    3  Aftercare following right knee joint replacement surgery  Z47 1     Z96 651      Subjective:  Flor Rizvi states his left shoulder is feeling better  Objective: See treatment log below  Flor Rizvi continues to be advised to follow his home exercise program as tolerated  When Flor Rizvi is feeling good he follows his rehab exercises in the gym and on other days he follows his home exercise program at home as tolerated  In the future we plan on having Bjorn stay at home and follow his home exercise program for four to six weeks and than assess for either more Rehab treatments or discharge from Rehab care  Assessment: Tolerated treatment well  Patient exhibited good technique with therapeutic exercises and would benefit from continued PT  Bjorn's goals are to continue to improve with his rehab program and improve with functional mobility, speed, repetition and decreased c/o pain with his gym and home exercise program   Bjorn's final goal for his rehab is to be discharged from Rehab care after obtaining his full functional rehab potential     Plan: Continue per plan of care  Progress treatment as tolerated  Precautions:  Left Shoulder Surgery Rotator Cuff Repair    All treatments below will be provided with a focus on strengthening, flexibility, ROM, postural,   endurance and any possible swelling and pain which may be present without ignoring   neural issues involving balance, coordination and proprioception which is also important   and necessary to provide full functional mobility and quality care        Daily Treatment Log  Manual  9/15 9/17   9/11   MT, ROM 30' 30'   15'   HEP        Exercise Log 9/15 9/17   9/11   [facility]        Power Web        Digiflex        Finger Ladder        Maria R-BP,PD,Lats,Row 65#Lats,PD,Row,BP 30x 55#-Lats,PD  Row, BP 30x   35# 30x   NuStep 10' L9 10' L9   10' L9   W/P-PNF,IR,ER,PU,PS,Throw-Top,Mid,Bot 25#Top  10#Mid 30x 25#-Top  10#Mid 30x   22 5# Top  12 5# Mid  30x     W/P-OH 2x30 2x30   2x30   Rotation Bar Sup/Pro     30x   WallSlidesITVY 2x30 2x30   [address] 2x30 2x30   2x30   [place], PE 15' 15'   15'           Modalities 9/15 9/17   9/11   MH&ES 20'MH 20'   15'

## 2020-09-17 NOTE — PROGRESS NOTES
PT Re-Evaluation   Today's date: 2020    Patient name: Bertha Parks  : 1950  MRN: [id number]  Referring provider: Ericka Sinclair  Dx:   Encounter Diagnosis     ICD-10-CM    1  Aftercare following surgery  Z48 89    2  Acute pain of left shoulder  M25 512    3  Aftercare following right knee joint replacement surgery  Z47 1     Z96 651    4  Difficulty walking  R26 2    5  Acute pain of right knee  M25 561      Assessment  Assessment details: Patient is doing well  He is getting stronger and has more movement  He can perform more chores with fewer rests  Impairments: abnormal or restricted ROM, abnormal movement, activity intolerance, impaired physical strength, pain with function, safety issue and scapular dyskinesis  Understanding of Dx/Px/POC: excellent   Prognosis: good    Goals  STG 2-4 weeks:   Increase UE strength by 3-6 lbs  Decrease pain by 1-2 levels on 1-10 pain scale  Increase UE PROM by 10-15 Degrees in all planes  Reduce C/O pain with lying on either side  Initiate HEP  LTG 6-8 weeks:   Increase UE strength 10-20 lbs  Demonstrate UE AROM WFL in all planes  Decrease pain to <2  Improve strength by 10-20 lbs  Return to lying on their right or left side with minimal difficulty  Improve sleep status limitations to none  D/C with HEP  Plan  Plan details: All planned modality interventions and planned therapy interventions are provided PRN    Patient would benefit from: PT eval and skilled physical therapy  Planned modality interventions: ultrasound, unattended electrical stimulation and TENS  Planned therapy interventions: joint mobilization, manual therapy, neuromuscular re-education, patient education, postural training, self care, strengthening, stretching, therapeutic activities, therapeutic exercise, therapeutic training, home exercise program, graded exercise, flexibility and coordination  Frequency: 3x week  Duration in weeks: 12  Treatment plan discussed with: patient      Subjective Evaluation    Pain  Quality: discomfort, knife-like, pulling, squeezing, sharp and tight  Relieving factors: support, rest, relaxation and change in position  Aggravating factors: lifting and overhead activity  Progression: improved    Treatments  Previous treatment: physical therapy  Current treatment: physical therapy  Patient Goals  Patient goals for therapy: decreased pain, increased motion, return to work, return to [employer] activities, independence with ADLs/IADLs and increased strength      Date of onset:  2/15/2020    Date of Surgery:  7/7/2020    History of Present Episode: 8/7/2020  Viktoriya Gardner states he has had left shoulder issues for many years  He just received surgery around 7/7/2020  Past Medical History:    8/7/2020  Viktoriya Gardner reports R TKR  Left shoulder issues  Neck Issues  Neck surgery C3-5 fused  Previous Level of Functional Ability:  8/7/2020  Viktoriya Gardner states his left shoulder was getting worse and he finally went for surgery  Inspection / Palpation:  Shoulder:  8/7/2020  Mesomorphic / Endomorphic body type  No signs of infection  No signs of wounds  No signs of drainage  No signs of ecchymotic regions  No signs of erythremic regions  Mild to moderate signs of muscle spasm  Mild to moderate signs of muscle guarding  Mild to moderate signs of tenderness reported to palpation  No signs of swelling  Positive signs of a surgery site  Current conditions appears consistent with recent acute recovery from surgery to his left shoulder  Chief Complaints:  8/7/2020  Bjorn reports moderate difficulty with bending his left shoulder  Viktoriya Gardner reports moderate difficulty with movement of his left shoulder  Viktoriya Gardner reports moderate to severe difficulty with use of his left arm  Viktoriya Gardner reports moderate to severe difficulty with lifting / elevating his left arm  Viktoriya Gardner reports moderate to severe difficulty with sleeping    Viktoriya Gardner reports moderate to severe difficulty with his strength and endurance  Kobe Fabian reports moderate limitations with his left shoulder range of motion  Kobe Fabian reports severe difficulty lying on his left shoulder region      SHOULDER PAIN Resting Palpation Moving Lifting Elevating   8/7/2020 Rt 0 0 0 0 0   8/7/2020 Lt  2-6 2-6 2-6 5-8 2-6   8/31/2020 Lt 1-5 1-5 1-5 4-7 2-5     SHOULDER PAIN Sleeping Throwing Pushing Pulling Twisting   8/7/2020 Rt 0 0 0 0 0   8/7/2020 Lt  2-6 NA 2-6 2-6 5-8   8/31/2020 Lt 2-5 NA 2-5 2-5 4-7     SHOULDER AROM Flexion Extension Abduction   8/7/2020 Rt 180° 65° 180°   8/7/2020 Lt 165° 51° 165°   8/31/2020 Lt 174° 58° 174°     SHOULDER AROM Hor Add Ext Rotation Int Rotation   8/7/2020 Rt 45° 98° 95°   8/7/2020 Lt 5° 78° 82°   8/31/2020 Lt 15° 84° 86°     SHLD MMT / PAIN Flexion Extension Abduction   8/7/2020 Rt 0/10  52 lbs 0/10  58 lbs 0/10  51 lbs   8/7/2020 Lt 2/10  32 lbs 2/10  33 lbs 5/10  15 lbs   8/31/2020 Lt 1/10  36 lbs 1/10  36 lbs 4/10  19 lbs     SHLD MMT / PAIN Hor Add Ext Rotation Int Rotation   8/7/2020 Rt 0/10  54 lbs 0/10  49 lbs 0/10  56 lbs   8/7/2020 Lt 2/10  21 lbs 5/10  15 lbs 4/10  16 lbs   8/31/2020 Lt 1/10  25 lbs 4/10  19 lbs 3/10  19 lbs     Shoulder Screen Rotator Cuff Apprehension Anterior  Stability Posterior  Stability   8/7/2020 Rt Negative Negative Negative Negative   8/7/2020 Lt Negative Negative Negative Negative     Shoulder Screen AC Joint SC Joint Drop Arm Adhesive Capsulitis   8/7/2020 Rt Negative Negative Negative Negative   8/7/2020 Lt Negative Negative Negative Negative     Precautions:  Left Shoulder Surgery Rotator Cuff Repair

## 2020-09-22 ENCOUNTER — OFFICE VISIT (OUTPATIENT)
Dept: PHYSICAL THERAPY | Facility: CLINIC | Age: 70
End: 2020-09-22
Payer: MEDICARE

## 2020-09-22 DIAGNOSIS — M25.512 ACUTE PAIN OF LEFT SHOULDER: ICD-10-CM

## 2020-09-22 DIAGNOSIS — Z48.89 AFTERCARE FOLLOWING SURGERY: Primary | ICD-10-CM

## 2020-09-22 PROCEDURE — 97014 ELECTRIC STIMULATION THERAPY: CPT

## 2020-09-22 PROCEDURE — 97112 NEUROMUSCULAR REEDUCATION: CPT

## 2020-09-22 PROCEDURE — 97140 MANUAL THERAPY 1/> REGIONS: CPT

## 2020-09-22 NOTE — PROGRESS NOTES
Today's date: 2020  Patient name: Karmen Kurtz  : 1950  MRN: [id number]  Referring provider: Glenna Garcia  Dx:   Encounter Diagnosis     ICD-10-CM    1  Aftercare following surgery  Z48 89    2  Acute pain of left shoulder  M25 512      Subjective:  No new c/o pain today  Objective: See treatment log below  Hawk Vizcaino continues to be advised to follow his home exercise program as tolerated  When Hawk Vizcaino is feeling good he follows his rehab exercises in the gym and on other days he follows his home exercise program at home as tolerated  In the future we plan on having Bjorn stay at home and follow his home exercise program for four to six weeks and than assess for either more Rehab treatments or discharge from Rehab care  Assessment: Tolerated treatment well  Patient exhibited good technique with therapeutic exercises and would benefit from continued PT to increase ROM/strength and endurance to improve mobility  Bjorn's goals are to continue to improve with his rehab program and improve with functional mobility, speed, repetition and decreased c/o pain with his gym and home exercise program   Bjorn's final goal for his rehab is to be discharged from Rehab care after obtaining his full functional rehab potential     Plan: Continue per plan of care  Progress treatment as tolerated  Precautions:  Left Shoulder Surgery Rotator Cuff Repair    All treatments below will be provided with a focus on strengthening, flexibility, ROM, postural,   endurance and any possible swelling and pain which may be present without ignoring   neural issues involving balance, coordination and proprioception which is also important   and necessary to provide full functional mobility and quality care        Daily Treatment Log  Manual  9/15 9/17 9/22     MT, ROM 30' 30' 20'     HEP        Exercise Log 9/15 9/17 9/22     [facility]        [employer]        [facility] [employer]        Digiflex Finger Ladder        Maria R-BP,PD,Lats,Row 65#Lats,PD,Row,BP 30x 55#-Lats,PD  Row, BP 30x 65# 30x     NuStep 10' L9 10' L9 NT     W/P-PNF,IR,ER,PU,PS,Throw-Top,Mid,Bot 25#Top  10#Mid 30x 25#-Top  10#Mid 30x 25# Top  10# Mid  30x     W/P-OH 2x30 2x30 2x30     Rotation Bar Sup/Pro        WallSlidesITVY 2x30 2x30 [address] [place] 2x30 2x30 2x30     [place], PE 15' 15' 15'             Modalities 9/15 9/17 9/22     &ES 20'MH 20' 20'

## 2020-09-24 ENCOUNTER — OFFICE VISIT (OUTPATIENT)
Dept: PHYSICAL THERAPY | Facility: CLINIC | Age: 70
End: 2020-09-24
Payer: MEDICARE

## 2020-09-24 DIAGNOSIS — M25.561 ACUTE PAIN OF RIGHT KNEE: ICD-10-CM

## 2020-09-24 DIAGNOSIS — Z96.651 AFTERCARE FOLLOWING RIGHT KNEE JOINT REPLACEMENT SURGERY: ICD-10-CM

## 2020-09-24 DIAGNOSIS — R26.2 DIFFICULTY WALKING: ICD-10-CM

## 2020-09-24 DIAGNOSIS — Z47.1 AFTERCARE FOLLOWING RIGHT KNEE JOINT REPLACEMENT SURGERY: ICD-10-CM

## 2020-09-24 DIAGNOSIS — M25.512 ACUTE PAIN OF LEFT SHOULDER: ICD-10-CM

## 2020-09-24 DIAGNOSIS — Z48.89 AFTERCARE FOLLOWING SURGERY: Primary | ICD-10-CM

## 2020-09-24 PROCEDURE — 97112 NEUROMUSCULAR REEDUCATION: CPT

## 2020-09-24 PROCEDURE — 97140 MANUAL THERAPY 1/> REGIONS: CPT

## 2020-09-24 NOTE — PROGRESS NOTES
Today's date: 2020  Patient name: Freddy Puentes  : 1950  MRN: [id number]  Referring provider: Elza Pena  Dx:   Encounter Diagnosis     ICD-10-CM    1  Aftercare following surgery  Z48 89    2  Acute pain of left shoulder  M25 512    3  Aftercare following right knee joint replacement surgery  Z47 1     Z96 651    4  Difficulty walking  R26 2    5  Acute pain of right knee  M25 561      Subjective:  No new c/o pain today  "I am pleased with my progress  I see my dr on Monday "    Objective: See treatment log below  Mariana Baldwin continues to be advised to follow his home exercise program as tolerated  When Mariana Baldwin is feeling good he follows his rehab exercises in the gym and on other days he follows his home exercise program at home as tolerated  In the future we plan on having Bjorn stay at home and follow his home exercise program for four to six weeks and than assess for either more Rehab treatments or discharge from Rehab care  Assessment: Tolerated treatment well  Patient exhibited good technique with therapeutic exercises and would benefit from continued PT  Bjorn's goals are to continue to improve with his rehab program and improve with functional mobility, speed, repetition and decreased c/o pain with his gym and home exercise program   Bjorn's final goal for his rehab is to be discharged from Rehab care after obtaining his full functional rehab potential     Plan: Continue per plan of care  Progress treatment as tolerated  Precautions:  Left Shoulder Surgery Rotator Cuff Repair    All treatments below will be provided with a focus on strengthening, flexibility, ROM, postural,   endurance and any possible swelling and pain which may be present without ignoring   neural issues involving balance, coordination and proprioception which is also important   and necessary to provide full functional mobility and quality care        Daily Treatment Log  Manual  9/15 9/17 9/22 9/24    MT, ROM 30' 30' 20' 30'    HEP        Exercise Log 9/15 9/17 9/22 9/24    [facility]        FW-Codmans        FWC-Curls,Tri        Power Web        Digiflex        Finger Ladder        Maria R-BP,PD,Lats,Row 65#Lats,PD,Row,BP 30x 55#-Lats,PD  Row, BP 30x 65# 30x 65# 30x    NuStep 10' L9 10' L9 NT     W/P-PNF,IR,ER,PU,PS,Throw-Top,Mid,Bot 25#Top  10#Mid 30x 25#-Top  10#Mid 30x 25# Top  10# Mid  30x 25#Top   10#Mid  30x    W/P-OH 2x30 2x30 2x30 2x30    Rotation Bar Sup/Pro        WallSlidesITVY 2x30 2x30 2x30 [address] [facility] 2x30 2x30 2x30 2x30    ME, PE 15' 15' 15' 15'            Modalities 9/15 9/17 9/22 9/24    MH&ES 20'MH 20' 20'  [address]

## 2020-09-29 ENCOUNTER — OFFICE VISIT (OUTPATIENT)
Dept: PHYSICAL THERAPY | Facility: CLINIC | Age: 70
End: 2020-09-29
Payer: MEDICARE

## 2020-09-29 DIAGNOSIS — M25.561 ACUTE PAIN OF RIGHT KNEE: ICD-10-CM

## 2020-09-29 DIAGNOSIS — Z47.1 AFTERCARE FOLLOWING RIGHT KNEE JOINT REPLACEMENT SURGERY: ICD-10-CM

## 2020-09-29 DIAGNOSIS — R26.2 DIFFICULTY WALKING: ICD-10-CM

## 2020-09-29 DIAGNOSIS — Z96.651 AFTERCARE FOLLOWING RIGHT KNEE JOINT REPLACEMENT SURGERY: ICD-10-CM

## 2020-09-29 DIAGNOSIS — M25.512 ACUTE PAIN OF LEFT SHOULDER: ICD-10-CM

## 2020-09-29 DIAGNOSIS — Z48.89 AFTERCARE FOLLOWING SURGERY: Primary | ICD-10-CM

## 2020-09-29 PROCEDURE — 97140 MANUAL THERAPY 1/> REGIONS: CPT | Performed by: PHYSICAL THERAPIST

## 2020-09-29 PROCEDURE — 97112 NEUROMUSCULAR REEDUCATION: CPT | Performed by: PHYSICAL THERAPIST

## 2020-09-29 PROCEDURE — 97110 THERAPEUTIC EXERCISES: CPT | Performed by: PHYSICAL THERAPIST

## 2020-09-29 NOTE — PROGRESS NOTES
/Daily Note   /  Today's date: 2020  Patient name: Hamzah Birmingham  : 1950  MRN: [id number]  Referring provider: Ericka Weiss  Dx:   Encounter Diagnosis     ICD-10-CM    1  Aftercare following surgery  Z48 89    2  Acute pain of left shoulder  M25 512    3  Aftercare following right knee joint replacement surgery  Z47 1     Z96 651    4  Difficulty walking  R26 2    5  Acute pain of right knee  M25 561                   Subjective:  Patient reports f/u with MD yesterday who was happy with progress at PT  Patient reports the pain levels are lower with increase movement but continuation of weakness in the UE with limitations with lifting, carrying, and OH activities  Objective: See treatment diary below      Assessment: Tolerated treatment well  PT notes continuation of progression of TE program with focus on scapular stabilization and manual therapy to decrease pain levels and improve functional limitations to meet therapy goals  PT notes the patient with continuation of decrease ROM and strength t/o the right shoulder and scapula with need for continuation of skilled therapy  Plan: Continue per plan of care  Daily Treatment Log  Manual  9/15 9/17 9/22 9/24 9/29   MT, ROM 30' 30' 20' 30' 15 min    HEP        Exercise Log 9/15 9/17 9/22 9/24 9/29   [facility]        FWC-Codmans        FWC-Curls,Tri        Power Web        Digiflex        Finger Ladder        Maria R-BP,PD,Lats,Row 65#Lats,PD,Row,BP 30x 55#-Lats,PD  Row, BP 30x 65# 30x 65# 30x 65#   30X   NuStep 10' L9 10' L9 NT     W/P-PNF,IR,ER,PU,PS,Throw-Top,Mid,Bot 25#Top  10#Mid 30x 25#-Top  10#Mid 30x 25# Top  10# Mid  30x 25#Top   10#Mid  30x 25# Top  10# Mid  30X    W/P-OH 2x30 2x30 2x30 2x30 2x30 Each    Rotation Bar Sup/Pro        WallSlidesITVY 2x30 2x30 2x30 2x30 2x30   Ball Wall Rolls 2x30 2x30 2x30 2x30 2x30    ME, PE 15' 15' 15' 15'            Modalities 9/15 9/17 9/22 9/24 9/29   MH&ES 20'MH 20' 20'  [place] 15 min CELE   US

## 2020-10-01 ENCOUNTER — OFFICE VISIT (OUTPATIENT)
Dept: PHYSICAL THERAPY | Facility: CLINIC | Age: 70
End: 2020-10-01
Payer: MEDICARE

## 2020-10-01 DIAGNOSIS — Z48.89 AFTERCARE FOLLOWING SURGERY: Primary | ICD-10-CM

## 2020-10-01 DIAGNOSIS — M25.512 ACUTE PAIN OF LEFT SHOULDER: ICD-10-CM

## 2020-10-01 PROCEDURE — 97014 ELECTRIC STIMULATION THERAPY: CPT

## 2020-10-01 PROCEDURE — 97112 NEUROMUSCULAR REEDUCATION: CPT

## 2020-10-01 PROCEDURE — 97140 MANUAL THERAPY 1/> REGIONS: CPT

## 2020-10-06 ENCOUNTER — OFFICE VISIT (OUTPATIENT)
Dept: PHYSICAL THERAPY | Facility: CLINIC | Age: 70
End: 2020-10-06
Payer: MEDICARE

## 2020-10-06 DIAGNOSIS — M25.512 ACUTE PAIN OF LEFT SHOULDER: ICD-10-CM

## 2020-10-06 DIAGNOSIS — Z48.89 AFTERCARE FOLLOWING SURGERY: Primary | ICD-10-CM

## 2020-10-06 PROCEDURE — 97112 NEUROMUSCULAR REEDUCATION: CPT

## 2020-10-06 PROCEDURE — 97014 ELECTRIC STIMULATION THERAPY: CPT

## 2020-10-06 PROCEDURE — 97140 MANUAL THERAPY 1/> REGIONS: CPT

## 2020-10-08 ENCOUNTER — OFFICE VISIT (OUTPATIENT)
Dept: PHYSICAL THERAPY | Facility: CLINIC | Age: 70
End: 2020-10-08
Payer: MEDICARE

## 2020-10-08 DIAGNOSIS — M25.512 ACUTE PAIN OF LEFT SHOULDER: ICD-10-CM

## 2020-10-08 DIAGNOSIS — Z48.89 AFTERCARE FOLLOWING SURGERY: Primary | ICD-10-CM

## 2020-10-08 PROCEDURE — 97014 ELECTRIC STIMULATION THERAPY: CPT

## 2020-10-08 PROCEDURE — 97112 NEUROMUSCULAR REEDUCATION: CPT

## 2020-10-08 PROCEDURE — 97140 MANUAL THERAPY 1/> REGIONS: CPT

## 2020-10-13 ENCOUNTER — OFFICE VISIT (OUTPATIENT)
Dept: PHYSICAL THERAPY | Facility: CLINIC | Age: 70
End: 2020-10-13
Payer: MEDICARE

## 2020-10-13 DIAGNOSIS — M25.512 ACUTE PAIN OF LEFT SHOULDER: ICD-10-CM

## 2020-10-13 DIAGNOSIS — Z48.89 AFTERCARE FOLLOWING SURGERY: Primary | ICD-10-CM

## 2020-10-13 PROCEDURE — 97014 ELECTRIC STIMULATION THERAPY: CPT

## 2020-10-13 PROCEDURE — 97140 MANUAL THERAPY 1/> REGIONS: CPT

## 2020-10-13 PROCEDURE — 97112 NEUROMUSCULAR REEDUCATION: CPT

## 2020-10-13 PROCEDURE — 97164 PT RE-EVAL EST PLAN CARE: CPT | Performed by: PHYSICAL THERAPIST

## 2020-10-15 ENCOUNTER — OFFICE VISIT (OUTPATIENT)
Dept: PHYSICAL THERAPY | Facility: CLINIC | Age: 70
End: 2020-10-15
Payer: MEDICARE

## 2020-10-15 DIAGNOSIS — Z48.89 AFTERCARE FOLLOWING SURGERY: Primary | ICD-10-CM

## 2020-10-15 DIAGNOSIS — M25.512 ACUTE PAIN OF LEFT SHOULDER: ICD-10-CM

## 2020-10-15 PROCEDURE — 97140 MANUAL THERAPY 1/> REGIONS: CPT

## 2020-10-15 PROCEDURE — 97014 ELECTRIC STIMULATION THERAPY: CPT

## 2020-10-15 PROCEDURE — 97112 NEUROMUSCULAR REEDUCATION: CPT

## 2020-10-20 ENCOUNTER — OFFICE VISIT (OUTPATIENT)
Dept: PHYSICAL THERAPY | Facility: CLINIC | Age: 70
End: 2020-10-20
Payer: MEDICARE

## 2020-10-20 DIAGNOSIS — Z48.89 AFTERCARE FOLLOWING SURGERY: Primary | ICD-10-CM

## 2020-10-20 DIAGNOSIS — M25.512 ACUTE PAIN OF LEFT SHOULDER: ICD-10-CM

## 2020-10-20 PROCEDURE — 97112 NEUROMUSCULAR REEDUCATION: CPT

## 2020-10-20 PROCEDURE — 97014 ELECTRIC STIMULATION THERAPY: CPT

## 2020-10-20 PROCEDURE — 97140 MANUAL THERAPY 1/> REGIONS: CPT

## 2020-10-21 ENCOUNTER — TRANSCRIBE ORDERS (OUTPATIENT)
Dept: PHYSICAL THERAPY | Facility: CLINIC | Age: 70
End: 2020-10-21

## 2020-10-21 DIAGNOSIS — Z48.89 AFTERCARE FOLLOWING SURGERY: Primary | ICD-10-CM

## 2020-10-21 DIAGNOSIS — M25.512 ACUTE PAIN OF LEFT SHOULDER: ICD-10-CM

## 2020-10-22 ENCOUNTER — OFFICE VISIT (OUTPATIENT)
Dept: PHYSICAL THERAPY | Facility: CLINIC | Age: 70
End: 2020-10-22
Payer: MEDICARE

## 2020-10-22 DIAGNOSIS — M25.512 ACUTE PAIN OF LEFT SHOULDER: ICD-10-CM

## 2020-10-22 DIAGNOSIS — Z48.89 AFTERCARE FOLLOWING SURGERY: Primary | ICD-10-CM

## 2020-10-22 PROCEDURE — 97014 ELECTRIC STIMULATION THERAPY: CPT

## 2020-10-22 PROCEDURE — 97112 NEUROMUSCULAR REEDUCATION: CPT

## 2020-10-22 PROCEDURE — 97140 MANUAL THERAPY 1/> REGIONS: CPT

## 2020-10-27 ENCOUNTER — OFFICE VISIT (OUTPATIENT)
Dept: PHYSICAL THERAPY | Facility: CLINIC | Age: 70
End: 2020-10-27
Payer: MEDICARE

## 2020-10-27 DIAGNOSIS — M25.512 ACUTE PAIN OF LEFT SHOULDER: ICD-10-CM

## 2020-10-27 DIAGNOSIS — Z48.89 AFTERCARE FOLLOWING SURGERY: Primary | ICD-10-CM

## 2020-10-27 PROCEDURE — 97112 NEUROMUSCULAR REEDUCATION: CPT

## 2020-10-27 PROCEDURE — 97140 MANUAL THERAPY 1/> REGIONS: CPT

## 2020-10-27 PROCEDURE — 97014 ELECTRIC STIMULATION THERAPY: CPT

## 2020-10-29 ENCOUNTER — TRANSCRIBE ORDERS (OUTPATIENT)
Dept: PHYSICAL THERAPY | Facility: CLINIC | Age: 70
End: 2020-10-29

## 2020-10-29 ENCOUNTER — OFFICE VISIT (OUTPATIENT)
Dept: PHYSICAL THERAPY | Facility: CLINIC | Age: 70
End: 2020-10-29
Payer: MEDICARE

## 2020-10-29 DIAGNOSIS — M54.2 NECK PAIN: ICD-10-CM

## 2020-10-29 DIAGNOSIS — M54.16 LUMBAR RADICULOPATHY: Primary | ICD-10-CM

## 2020-10-29 DIAGNOSIS — R26.2 DIFFICULTY WALKING: ICD-10-CM

## 2020-10-29 PROCEDURE — 97535 SELF CARE MNGMENT TRAINING: CPT | Performed by: PHYSICAL THERAPIST

## 2020-10-29 PROCEDURE — 97140 MANUAL THERAPY 1/> REGIONS: CPT | Performed by: PHYSICAL THERAPIST

## 2020-10-29 PROCEDURE — 97162 PT EVAL MOD COMPLEX 30 MIN: CPT | Performed by: PHYSICAL THERAPIST

## 2020-11-03 ENCOUNTER — OFFICE VISIT (OUTPATIENT)
Dept: PHYSICAL THERAPY | Facility: CLINIC | Age: 70
End: 2020-11-03
Payer: MEDICARE

## 2020-11-03 DIAGNOSIS — R26.2 DIFFICULTY WALKING: ICD-10-CM

## 2020-11-03 DIAGNOSIS — M54.2 NECK PAIN: ICD-10-CM

## 2020-11-03 DIAGNOSIS — M54.16 LUMBAR RADICULOPATHY: Primary | ICD-10-CM

## 2020-11-03 PROCEDURE — 97014 ELECTRIC STIMULATION THERAPY: CPT

## 2020-11-03 PROCEDURE — 97112 NEUROMUSCULAR REEDUCATION: CPT

## 2020-11-03 PROCEDURE — 97140 MANUAL THERAPY 1/> REGIONS: CPT

## 2020-11-05 ENCOUNTER — OFFICE VISIT (OUTPATIENT)
Dept: PHYSICAL THERAPY | Facility: CLINIC | Age: 70
End: 2020-11-05
Payer: MEDICARE

## 2020-11-05 DIAGNOSIS — M54.2 NECK PAIN: ICD-10-CM

## 2020-11-05 DIAGNOSIS — R26.2 DIFFICULTY WALKING: ICD-10-CM

## 2020-11-05 DIAGNOSIS — M54.16 LUMBAR RADICULOPATHY: Primary | ICD-10-CM

## 2020-11-05 PROCEDURE — 97140 MANUAL THERAPY 1/> REGIONS: CPT

## 2020-11-05 PROCEDURE — 97112 NEUROMUSCULAR REEDUCATION: CPT

## 2020-11-05 PROCEDURE — 97014 ELECTRIC STIMULATION THERAPY: CPT

## 2020-11-10 ENCOUNTER — OFFICE VISIT (OUTPATIENT)
Dept: PHYSICAL THERAPY | Facility: CLINIC | Age: 70
End: 2020-11-10
Payer: MEDICARE

## 2020-11-10 DIAGNOSIS — M54.2 NECK PAIN: ICD-10-CM

## 2020-11-10 DIAGNOSIS — R26.2 DIFFICULTY WALKING: ICD-10-CM

## 2020-11-10 DIAGNOSIS — M54.16 LUMBAR RADICULOPATHY: Primary | ICD-10-CM

## 2020-11-10 PROCEDURE — 97014 ELECTRIC STIMULATION THERAPY: CPT

## 2020-11-10 PROCEDURE — 97112 NEUROMUSCULAR REEDUCATION: CPT | Performed by: PHYSICAL THERAPIST

## 2020-11-10 PROCEDURE — 97140 MANUAL THERAPY 1/> REGIONS: CPT

## 2020-11-12 ENCOUNTER — OFFICE VISIT (OUTPATIENT)
Dept: PHYSICAL THERAPY | Facility: CLINIC | Age: 70
End: 2020-11-12
Payer: MEDICARE

## 2020-11-12 DIAGNOSIS — R26.2 DIFFICULTY WALKING: ICD-10-CM

## 2020-11-12 DIAGNOSIS — M54.2 NECK PAIN: ICD-10-CM

## 2020-11-12 DIAGNOSIS — M54.16 LUMBAR RADICULOPATHY: Primary | ICD-10-CM

## 2020-11-12 PROCEDURE — 97112 NEUROMUSCULAR REEDUCATION: CPT

## 2020-11-12 PROCEDURE — 97140 MANUAL THERAPY 1/> REGIONS: CPT

## 2020-11-12 PROCEDURE — 97014 ELECTRIC STIMULATION THERAPY: CPT

## 2020-11-19 ENCOUNTER — OFFICE VISIT (OUTPATIENT)
Dept: PHYSICAL THERAPY | Facility: CLINIC | Age: 70
End: 2020-11-19
Payer: MEDICARE

## 2020-11-19 DIAGNOSIS — M54.2 NECK PAIN: ICD-10-CM

## 2020-11-19 DIAGNOSIS — M25.512 ACUTE PAIN OF LEFT SHOULDER: ICD-10-CM

## 2020-11-19 DIAGNOSIS — R26.2 DIFFICULTY WALKING: ICD-10-CM

## 2020-11-19 DIAGNOSIS — Z48.89 AFTERCARE FOLLOWING SURGERY: ICD-10-CM

## 2020-11-19 DIAGNOSIS — M54.16 LUMBAR RADICULOPATHY: Primary | ICD-10-CM

## 2020-11-19 PROCEDURE — 97140 MANUAL THERAPY 1/> REGIONS: CPT

## 2020-11-19 PROCEDURE — 97014 ELECTRIC STIMULATION THERAPY: CPT

## 2020-11-19 PROCEDURE — 97112 NEUROMUSCULAR REEDUCATION: CPT

## 2020-11-24 ENCOUNTER — OFFICE VISIT (OUTPATIENT)
Dept: PHYSICAL THERAPY | Facility: CLINIC | Age: 70
End: 2020-11-24
Payer: MEDICARE

## 2020-11-24 DIAGNOSIS — M25.512 ACUTE PAIN OF LEFT SHOULDER: ICD-10-CM

## 2020-11-24 DIAGNOSIS — M54.2 NECK PAIN: ICD-10-CM

## 2020-11-24 DIAGNOSIS — M54.16 LUMBAR RADICULOPATHY: Primary | ICD-10-CM

## 2020-11-24 DIAGNOSIS — Z48.89 AFTERCARE FOLLOWING SURGERY: ICD-10-CM

## 2020-11-24 DIAGNOSIS — R26.2 DIFFICULTY WALKING: ICD-10-CM

## 2020-11-24 PROCEDURE — 97014 ELECTRIC STIMULATION THERAPY: CPT

## 2020-11-24 PROCEDURE — 97140 MANUAL THERAPY 1/> REGIONS: CPT

## 2020-11-26 ENCOUNTER — APPOINTMENT (OUTPATIENT)
Dept: PHYSICAL THERAPY | Facility: CLINIC | Age: 70
End: 2020-11-26
Payer: MEDICARE

## 2020-11-27 ENCOUNTER — APPOINTMENT (OUTPATIENT)
Dept: PHYSICAL THERAPY | Facility: CLINIC | Age: 70
End: 2020-11-27
Payer: MEDICARE

## 2020-12-01 ENCOUNTER — OFFICE VISIT (OUTPATIENT)
Dept: PHYSICAL THERAPY | Facility: CLINIC | Age: 70
End: 2020-12-01
Payer: MEDICARE

## 2020-12-01 DIAGNOSIS — R26.2 DIFFICULTY WALKING: ICD-10-CM

## 2020-12-01 DIAGNOSIS — M54.2 NECK PAIN: ICD-10-CM

## 2020-12-01 DIAGNOSIS — M54.16 LUMBAR RADICULOPATHY: Primary | ICD-10-CM

## 2020-12-01 PROCEDURE — 97014 ELECTRIC STIMULATION THERAPY: CPT

## 2020-12-01 PROCEDURE — 97140 MANUAL THERAPY 1/> REGIONS: CPT

## 2020-12-01 PROCEDURE — 97112 NEUROMUSCULAR REEDUCATION: CPT

## 2020-12-01 NOTE — PROGRESS NOTES
Today's date: 2019  Patient name: Cheo Holland  : 1950  MRN: [id number]  Referring provider: Remy Solorzano MD  Dx:   Encounter Diagnosis     ICD-10-CM    1  Aftercare following right knee joint replacement surgery Z47 1     Z96 651    2  Difficulty walking R26 2    3  Acute pain of right knee M25 561      Subjective:  Ofilia Aas states his knee is feeling better  His range of motion is progressing  Objective: See treatment log below    Assessment: Tolerated treatment well  Patient exhibited good technique with therapeutic exercises and would benefit from continued PT    Plan: Continue per plan of care  Progress treatment as tolerated         Precautions:  R TKR SX - Replacement Repair / Replace    Daily Treatment Log  Manual       MT, ROM 30' 15' 15'     HEP        Exercise Log      Balance Board  30x 30x     Chair Squats        P-Bar-GT-Forward, Backward,Side-Even & Dips  10x 10x     BOSU-Walk  5' 5'     Foam Pad SLR,Hip/KneeFl,Step Ups  (doubled)  30x 30x     Foam Beam  30x 30x     T/G-Squats PF  30x 30x     W/P-Hip-Abd,Add,Flex,Ext    27 5#-30x 27 5#-30x     WP-Squats    27 5#-30x 27 5#-30x     Trimax-TKE        Bfscyre-FK-Hv 2x2' 2x2' 2x2'     NK Table Exer  15#-30x 15#-30x     NK Table ROM  2x10' 2x10'     TM        Stepper 1-1  10' 10'     Bike 10' 10' 10'     ME, PE 15' 15' 15'             Modalities      MH&ES NT       US NT Medical Necessity Clause: This procedure was medically necessary because the lesion that was treated was:

## 2020-12-03 ENCOUNTER — OFFICE VISIT (OUTPATIENT)
Dept: PHYSICAL THERAPY | Facility: CLINIC | Age: 70
End: 2020-12-03
Payer: MEDICARE

## 2020-12-03 DIAGNOSIS — M54.2 NECK PAIN: ICD-10-CM

## 2020-12-03 DIAGNOSIS — M54.16 LUMBAR RADICULOPATHY: Primary | ICD-10-CM

## 2020-12-03 DIAGNOSIS — M25.512 ACUTE PAIN OF LEFT SHOULDER: ICD-10-CM

## 2020-12-03 DIAGNOSIS — R26.2 DIFFICULTY WALKING: ICD-10-CM

## 2020-12-03 DIAGNOSIS — Z48.89 AFTERCARE FOLLOWING SURGERY: ICD-10-CM

## 2020-12-03 PROCEDURE — 97112 NEUROMUSCULAR REEDUCATION: CPT

## 2020-12-03 PROCEDURE — 97014 ELECTRIC STIMULATION THERAPY: CPT

## 2020-12-03 PROCEDURE — 97140 MANUAL THERAPY 1/> REGIONS: CPT

## 2020-12-08 ENCOUNTER — OFFICE VISIT (OUTPATIENT)
Dept: PHYSICAL THERAPY | Facility: CLINIC | Age: 70
End: 2020-12-08
Payer: MEDICARE

## 2020-12-08 DIAGNOSIS — R26.2 DIFFICULTY WALKING: ICD-10-CM

## 2020-12-08 DIAGNOSIS — M54.16 LUMBAR RADICULOPATHY: Primary | ICD-10-CM

## 2020-12-08 DIAGNOSIS — M25.512 ACUTE PAIN OF LEFT SHOULDER: ICD-10-CM

## 2020-12-08 DIAGNOSIS — Z48.89 AFTERCARE FOLLOWING SURGERY: ICD-10-CM

## 2020-12-08 DIAGNOSIS — M54.2 NECK PAIN: ICD-10-CM

## 2020-12-08 PROCEDURE — 97014 ELECTRIC STIMULATION THERAPY: CPT

## 2020-12-08 PROCEDURE — 97164 PT RE-EVAL EST PLAN CARE: CPT | Performed by: PHYSICAL THERAPIST

## 2020-12-08 PROCEDURE — 97140 MANUAL THERAPY 1/> REGIONS: CPT

## 2020-12-08 PROCEDURE — 97112 NEUROMUSCULAR REEDUCATION: CPT

## 2020-12-09 ENCOUNTER — TRANSCRIBE ORDERS (OUTPATIENT)
Dept: PHYSICAL THERAPY | Facility: CLINIC | Age: 70
End: 2020-12-09

## 2020-12-09 DIAGNOSIS — M54.2 NECK PAIN: ICD-10-CM

## 2020-12-09 DIAGNOSIS — R26.2 DIFFICULTY WALKING: ICD-10-CM

## 2020-12-09 DIAGNOSIS — M54.16 LUMBAR RADICULOPATHY: Primary | ICD-10-CM

## 2020-12-10 ENCOUNTER — OFFICE VISIT (OUTPATIENT)
Dept: PHYSICAL THERAPY | Facility: CLINIC | Age: 70
End: 2020-12-10
Payer: MEDICARE

## 2020-12-10 DIAGNOSIS — M54.16 LUMBAR RADICULOPATHY: Primary | ICD-10-CM

## 2020-12-10 DIAGNOSIS — R26.2 DIFFICULTY WALKING: ICD-10-CM

## 2020-12-10 DIAGNOSIS — Z48.89 AFTERCARE FOLLOWING SURGERY: ICD-10-CM

## 2020-12-10 DIAGNOSIS — M25.512 ACUTE PAIN OF LEFT SHOULDER: ICD-10-CM

## 2020-12-10 DIAGNOSIS — M54.2 NECK PAIN: ICD-10-CM

## 2020-12-10 PROCEDURE — 97140 MANUAL THERAPY 1/> REGIONS: CPT

## 2020-12-14 ENCOUNTER — OFFICE VISIT (OUTPATIENT)
Dept: PHYSICAL THERAPY | Facility: CLINIC | Age: 70
End: 2020-12-14
Payer: MEDICARE

## 2020-12-14 DIAGNOSIS — M54.16 LUMBAR RADICULOPATHY: Primary | ICD-10-CM

## 2020-12-14 DIAGNOSIS — M54.2 NECK PAIN: ICD-10-CM

## 2020-12-14 DIAGNOSIS — R26.2 DIFFICULTY WALKING: ICD-10-CM

## 2020-12-14 PROCEDURE — 97140 MANUAL THERAPY 1/> REGIONS: CPT

## 2020-12-15 ENCOUNTER — APPOINTMENT (OUTPATIENT)
Dept: PHYSICAL THERAPY | Facility: CLINIC | Age: 70
End: 2020-12-15
Payer: MEDICARE

## 2020-12-17 ENCOUNTER — APPOINTMENT (OUTPATIENT)
Dept: PHYSICAL THERAPY | Facility: CLINIC | Age: 70
End: 2020-12-17
Payer: MEDICARE

## 2020-12-22 ENCOUNTER — OFFICE VISIT (OUTPATIENT)
Dept: PHYSICAL THERAPY | Facility: CLINIC | Age: 70
End: 2020-12-22
Payer: MEDICARE

## 2020-12-22 DIAGNOSIS — M25.512 ACUTE PAIN OF LEFT SHOULDER: ICD-10-CM

## 2020-12-22 DIAGNOSIS — R26.2 DIFFICULTY WALKING: ICD-10-CM

## 2020-12-22 DIAGNOSIS — M54.2 NECK PAIN: ICD-10-CM

## 2020-12-22 DIAGNOSIS — Z48.89 AFTERCARE FOLLOWING SURGERY: ICD-10-CM

## 2020-12-22 DIAGNOSIS — M54.16 LUMBAR RADICULOPATHY: Primary | ICD-10-CM

## 2020-12-22 PROCEDURE — 97112 NEUROMUSCULAR REEDUCATION: CPT

## 2020-12-22 PROCEDURE — 97140 MANUAL THERAPY 1/> REGIONS: CPT

## 2020-12-24 ENCOUNTER — OFFICE VISIT (OUTPATIENT)
Dept: PHYSICAL THERAPY | Facility: CLINIC | Age: 70
End: 2020-12-24
Payer: MEDICARE

## 2020-12-24 DIAGNOSIS — Z48.89 AFTERCARE FOLLOWING SURGERY: ICD-10-CM

## 2020-12-24 DIAGNOSIS — M25.512 ACUTE PAIN OF LEFT SHOULDER: ICD-10-CM

## 2020-12-24 DIAGNOSIS — M54.16 LUMBAR RADICULOPATHY: Primary | ICD-10-CM

## 2020-12-24 DIAGNOSIS — M54.2 NECK PAIN: ICD-10-CM

## 2020-12-24 DIAGNOSIS — R26.2 DIFFICULTY WALKING: ICD-10-CM

## 2020-12-24 PROCEDURE — 97112 NEUROMUSCULAR REEDUCATION: CPT | Performed by: PHYSICAL THERAPIST

## 2020-12-24 PROCEDURE — 97110 THERAPEUTIC EXERCISES: CPT | Performed by: PHYSICAL THERAPIST

## 2020-12-24 PROCEDURE — 97140 MANUAL THERAPY 1/> REGIONS: CPT | Performed by: PHYSICAL THERAPIST

## 2020-12-29 ENCOUNTER — OFFICE VISIT (OUTPATIENT)
Dept: PHYSICAL THERAPY | Facility: CLINIC | Age: 70
End: 2020-12-29
Payer: MEDICARE

## 2020-12-29 DIAGNOSIS — M54.16 LUMBAR RADICULOPATHY: Primary | ICD-10-CM

## 2020-12-29 DIAGNOSIS — M25.512 ACUTE PAIN OF LEFT SHOULDER: ICD-10-CM

## 2020-12-29 DIAGNOSIS — M54.2 NECK PAIN: ICD-10-CM

## 2020-12-29 DIAGNOSIS — R26.2 DIFFICULTY WALKING: ICD-10-CM

## 2020-12-29 DIAGNOSIS — Z48.89 AFTERCARE FOLLOWING SURGERY: ICD-10-CM

## 2020-12-29 PROCEDURE — 97110 THERAPEUTIC EXERCISES: CPT | Performed by: PHYSICAL THERAPIST

## 2020-12-29 PROCEDURE — 97112 NEUROMUSCULAR REEDUCATION: CPT | Performed by: PHYSICAL THERAPIST

## 2020-12-29 PROCEDURE — 97140 MANUAL THERAPY 1/> REGIONS: CPT | Performed by: PHYSICAL THERAPIST

## 2020-12-31 ENCOUNTER — OFFICE VISIT (OUTPATIENT)
Dept: PHYSICAL THERAPY | Facility: CLINIC | Age: 70
End: 2020-12-31
Payer: MEDICARE

## 2020-12-31 DIAGNOSIS — M54.16 LUMBAR RADICULOPATHY: Primary | ICD-10-CM

## 2020-12-31 DIAGNOSIS — M25.512 ACUTE PAIN OF LEFT SHOULDER: ICD-10-CM

## 2020-12-31 DIAGNOSIS — M54.2 NECK PAIN: ICD-10-CM

## 2020-12-31 DIAGNOSIS — R26.2 DIFFICULTY WALKING: ICD-10-CM

## 2020-12-31 DIAGNOSIS — Z48.89 AFTERCARE FOLLOWING SURGERY: ICD-10-CM

## 2020-12-31 PROCEDURE — 97140 MANUAL THERAPY 1/> REGIONS: CPT

## 2021-01-05 ENCOUNTER — OFFICE VISIT (OUTPATIENT)
Dept: PHYSICAL THERAPY | Facility: CLINIC | Age: 71
End: 2021-01-05
Payer: MEDICARE

## 2021-01-05 DIAGNOSIS — M54.2 NECK PAIN: ICD-10-CM

## 2021-01-05 DIAGNOSIS — M25.512 ACUTE PAIN OF LEFT SHOULDER: ICD-10-CM

## 2021-01-05 DIAGNOSIS — M54.16 LUMBAR RADICULOPATHY: Primary | ICD-10-CM

## 2021-01-05 DIAGNOSIS — Z48.89 AFTERCARE FOLLOWING SURGERY: ICD-10-CM

## 2021-01-05 DIAGNOSIS — R26.2 DIFFICULTY WALKING: ICD-10-CM

## 2021-01-05 PROCEDURE — 97014 ELECTRIC STIMULATION THERAPY: CPT

## 2021-01-05 PROCEDURE — 97140 MANUAL THERAPY 1/> REGIONS: CPT

## 2021-01-05 NOTE — PROGRESS NOTES
Today's date: 2021  Patient name: Prabha Davila  : 1950  MRN: [id number]  Referring provider: Liliane Isaacs MD  Dx:   Encounter Diagnosis     ICD-10-CM    1  Lumbar radiculopathy  M54 16    2  Difficulty walking  R26 2    3  Neck pain  M54 2    4  Aftercare following surgery  Z48 89    5  Acute pain of left shoulder  M25 512      Subjective:  No new c/o pain today  "My stiffness and pain is symmetrical     Objective: See treatment log below  Winter Muniz continues to be advised to follow his home exercise program as tolerated  When Winter Muniz is feeling good he follows his rehab exercises in the gym and on other days he follows his home exercise program at home as tolerated  In the future we plan on having Bjorn stay at home and follow his home exercise program for four to six weeks and than assess for either more Rehab treatments or discharge from Rehab care  Assessment: Tolerated treatment well  Patient exhibited good technique with therapeutic exercises and would benefit from continued PT  Bjorn's goals are to continue to improve with his rehab program and improve with functional mobility, speed, repetition and decreased c/o pain with his gym and home exercise program   Bjorn's final goal for his rehab is to be discharged from Rehab care after obtaining his full functional rehab potential     Plan: Continue per plan of care  Progress treatment as tolerated  Precautions:  Lumbar and Neck Issues    All treatments below will be provided with a focus on strengthening, flexibility, ROM, postural,   endurance and any possible swelling and pain which may be present without ignoring   neural issues involving balance, coordination and proprioception which is also important   and necessary to provide full functional mobility and quality care        Daily Treatment Log  Manual     MT, ROM 30'    35'   HEP        Exercise Log    Balance Board        Chair Squats BOSU-Walk        Foam Pad SLR,Hip/KneeFl,Step Ups        Foam Beam        P-Bar-GT-Forward, Backward,Side-Even & Dips        Monster Steps        Fitter-Slalom        T/G-Squats,PF        W/P-PNF,IR,ER,PU,PS:Top,Mid,Bot        NuStep NT    10' L2   Maria R-BP,Lats,PD,Row     NT   Maria R-TrunkFlex/Ext 45# 30x    NT   Vnznlft-CX-Nk 2x2'    2x2'   NK Table        TM        UBC        Bike        Mat-GreenBallDKTC,HipRot,Bridges        ME, PE 13'    15'           Modalities 1/5 12/31   [address] / PE / ES 21'    [facility]

## 2021-01-07 ENCOUNTER — OFFICE VISIT (OUTPATIENT)
Dept: PHYSICAL THERAPY | Facility: CLINIC | Age: 71
End: 2021-01-07
Payer: MEDICARE

## 2021-01-07 DIAGNOSIS — R26.2 DIFFICULTY WALKING: ICD-10-CM

## 2021-01-07 DIAGNOSIS — M25.512 ACUTE PAIN OF LEFT SHOULDER: ICD-10-CM

## 2021-01-07 DIAGNOSIS — M54.2 NECK PAIN: ICD-10-CM

## 2021-01-07 DIAGNOSIS — M54.16 LUMBAR RADICULOPATHY: Primary | ICD-10-CM

## 2021-01-07 DIAGNOSIS — Z48.89 AFTERCARE FOLLOWING SURGERY: ICD-10-CM

## 2021-01-07 PROCEDURE — 97014 ELECTRIC STIMULATION THERAPY: CPT

## 2021-01-07 PROCEDURE — 97140 MANUAL THERAPY 1/> REGIONS: CPT

## 2021-01-07 NOTE — PROGRESS NOTES
Today's date: 2021  Patient name: Cheo Holland  : 1950  MRN: [id number]  Referring provider: Patti Wilson MD  Dx:   Encounter Diagnosis     ICD-10-CM    1  Lumbar radiculopathy  M54 16    2  Difficulty walking  R26 2    3  Neck pain  M54 2    4  Aftercare following surgery  Z48 89    5  Acute pain of left shoulder  M25 512      Subjective:  No new c/o pain today  Objective: See treatment log below  Harjit Bowles continues to be advised to follow his home exercise program as tolerated  When Harjit Bowles is feeling good he follows his rehab exercises in the gym and on other days he follows his home exercise program at home as tolerated  In the future we plan on having Bjorn stay at home and follow his home exercise program for four to six weeks and than assess for either more Rehab treatments or discharge from Rehab care  Assessment: Tolerated treatment well  Patient exhibited good technique with therapeutic exercises and would benefit from continued PT  Bjorn's goals are to continue to improve with his rehab program and improve with functional mobility, speed, repetition and decreased c/o pain with his gym and home exercise program   Bjorn's final goal for his rehab is to be discharged from Rehab care after obtaining his full functional rehab potential     Plan: Continue per plan of care  Progress treatment as tolerated  Precautions:  Lumbar and Neck Issues    All treatments below will be provided with a focus on strengthening, flexibility, ROM, postural,   endurance and any possible swelling and pain which may be present without ignoring   neural issues involving balance, coordination and proprioception which is also important   and necessary to provide full functional mobility and quality care        Daily Treatment Log  Manual        MT, ROM 30' 30'      HEP        Exercise Log       Balance Board        Chair Squats        BOSU-Walk        Foam Pad SLR,Hip/KneeFl,Step Ups        Foam Beam        P-Bar-GT-Forward, Backward,Side-Even & Dips        Monster Steps        Fitter-Slalom        T/G-Squats,PF        W/P-PNF,IR,ER,PU,PS:Top,Mid,Bot        NuStep NT 10' L1      Maria R-BP,Lats,PD,Row        Maria R-TrunkFlex/Ext 45# 30x 45# 30x      [place]-FO-Es 2x2' 2x2'      NK Table        TM        UBC        Bike        Mat-GreenBallDKTC,HipRot,Bridges        ME, PE 13' 15'              Modalities 1/5 1/7      MH / PE / ES 21' 10'

## 2021-01-12 ENCOUNTER — OFFICE VISIT (OUTPATIENT)
Dept: PHYSICAL THERAPY | Facility: CLINIC | Age: 71
End: 2021-01-12
Payer: MEDICARE

## 2021-01-12 DIAGNOSIS — M54.2 NECK PAIN: ICD-10-CM

## 2021-01-12 DIAGNOSIS — R26.2 DIFFICULTY WALKING: ICD-10-CM

## 2021-01-12 DIAGNOSIS — M54.16 LUMBAR RADICULOPATHY: Primary | ICD-10-CM

## 2021-01-12 PROCEDURE — 97140 MANUAL THERAPY 1/> REGIONS: CPT

## 2021-01-12 PROCEDURE — 97164 PT RE-EVAL EST PLAN CARE: CPT | Performed by: PHYSICAL THERAPIST

## 2021-01-12 PROCEDURE — 97014 ELECTRIC STIMULATION THERAPY: CPT

## 2021-01-12 PROCEDURE — 97112 NEUROMUSCULAR REEDUCATION: CPT

## 2021-01-12 NOTE — LETTER
2021  PT Reevaluation [address] [facility] MD Jacques  [address]  [facility] [address]    Patient: Cyril Carvajal  YOB: 1950   Date of Visit: 2021     Encounter Diagnosis     ICD-10-CM    1  Lumbar radiculopathy  M54 16    2  Difficulty walking  R26 2    3  Neck pain  M54 2      Dear Dr Dre Granados:  Thank you for your recent referral of Cyril Carvajal    Please review the attached evaluation summary from Bjorn's recent visit  Please verify that you agree with the plan of care by signing the attached order  If you have any questions or concerns, please do not hesitate to call  I sincerely appreciate the opportunity to share in the care of one of your patients and hope to have another opportunity to work with you in the near future  Sincerely,    Nahid Sparks, PT    Referring Provider:      I certify that I have read the below Plan of Care and certify the need for these services furnished under this plan of treatment while under my care  Emma Fritz MD  [address]  [facility] [address]  Via Fax: [phone]    Please SIGN ABOVE and return THIS PAGE ONLY to Fax # [phone]          Today's date: 2021  Patient name: Cyril Carvajal  : 1950  MRN: [id number]  Referring provider: Saurabh Squires MD  Dx:   Encounter Diagnosis     ICD-10-CM    1  Lumbar radiculopathy  M54 16    2  Difficulty walking  R26 2    3  Neck pain  M54 2    4  Aftercare following surgery  Z48 89    5  Acute pain of left shoulder  M25 512      Subjective:  Patient voices that he is pleased with his progress  "I spend half of the day off of my feet  I also stretch at home and I go lay on my inversion table twice a week  I actually heard my spine crack twice this week which felt good  When I got up this morning I was able to get right up and go without having to try to move for 15 minutes "    Objective: See treatment log below  Velma Baker continues to be advised to follow his home exercise program as tolerated  When Velma Baker is feeling good he follows his rehab exercises in the gym and on other days he follows his home exercise program at home as tolerated  In the future we plan on having Bjorn stay at home and follow his home exercise program for four to six weeks and than assess for either more Rehab treatments or discharge from Rehab care  Assessment: Tolerated treatment well  Patient exhibited good technique with therapeutic exercises and would benefit from continued PT  Bjorn's goals are to continue to improve with his rehab program and improve with functional mobility, speed, repetition and decreased c/o pain with his gym and home exercise program   Bjorn's final goal for his rehab is to be discharged from Rehab care after obtaining his full functional rehab potential     Plan: Continue per plan of care  Progress treatment as tolerated  Precautions:  Lumbar and Neck Issues    All treatments below will be provided with a focus on strengthening, flexibility, ROM, postural,   endurance and any possible swelling and pain which may be present without ignoring   neural issues involving balance, coordination and proprioception which is also important   and necessary to provide full functional mobility and quality care        Daily Treatment Log  Manual  1/5 1/7 1/12     MT, ROM 30' 30' 35'     HEP        Exercise Log 1/5 1/7 1/12     Balance Board        Chair Squats        BOSU-Walk        Foam Pad SLR,Hip/KneeFl,Step Ups        Foam Beam        P-Bar-GT-Forward, Backward,Side-Even & Dips        Monster Steps        Fitter-Slalom        T/G-Squats,PF        W/P-PNF,IR,ER,PU,PS:Top,Mid,Bot        NuStep NT 10' L1 10' L5     Maria R-BP,Lats,PD,Row        Maria R-TrunkFlex/Ext 45# 30x 45# 30x 60# 30x     Xpvzgsk-OW-Ta 2x2' 2x2' 2x2'     NK Table        TM        UBC        Bike        Mat-GreenBallDKTC,HipRot,Bridges        ME, PE 15' 15' 15'             Modalities      MH / PE / ES 21' 10' 20'         Attestation signed by Yudelka Paulino PT at 2021 12:50 PM:  I supervised the visit  We discussed the case to ensure appropriate continuation and progression of care and I reviewed the documentation  PT Re-Evaluation   Today's date: 2021    Patient name: Prabha Davila  : 1950  MRN: [id number]  Referring provider: Liliane Isaacs MD  Dx:   Encounter Diagnosis     ICD-10-CM    1  Lumbar radiculopathy  M54 16    2  Difficulty walking  R26 2    3  Neck pain  M54 2      Assessment  Assessment details: Patient is feeling better  He is standing longer and walking further  He is sleeping better  He is very careful with his chores and other activities  Impairments: abnormal or restricted ROM, abnormal movement, activity intolerance, impaired physical strength, pain with function and safety issue  Understanding of Dx/Px/POC: excellent   Prognosis: good    Goals  STG  2-4 weeks:   Increase lumbar spine AROM by 2-4 degrees  Increase lower extremity strength by 2-4 lbs in all weak areas  Improve sitting posture and body mechanics  Increase standing/ADL tolerance minutes  Decrease pain by 25-50% with standing, walking, and stairs  Initiate HEP  LTG  6-8 weeks:   Demonstrate normal lumbar rotation  Decrease pain to 1-2/10 with activity  Increase lower extremity strength by 10-20 lbs in all weak areas  Improve spinal stability  Improve gait pattern and balance  Decrease limitations with standing, walking and ADL's  DC with HEP  Plan  Plan details: All planned modality interventions and planned therapy interventions are provided PRN    Patient would benefit from: PT eval and skilled physical therapy  Planned modality interventions: TENS, ultrasound and unattended electrical stimulation  Planned therapy interventions: joint mobilization, manual therapy, neuromuscular re-education, patient education, postural training, self care, strengthening, stretching, therapeutic activities, therapeutic exercise, therapeutic training, balance, balance/weight bearing training, coordination, flexibility, transfer training, gait training and graded exercise  Frequency: 3x week  Duration in weeks: 12  Treatment plan discussed with: patient      Subjective Evaluation    Pain  Quality: discomfort, knife-like, pulling, squeezing, tight, sharp and radiating  Relieving factors: support, rest, relaxation and change in position  Aggravating factors: lifting, running, stair climbing, walking and standing  Progression: improved    Treatments  Previous treatment: physical therapy  Current treatment: physical therapy  Patient Goals  Patient goals for therapy: decreased pain, improved balance, increased motion, return to work, return to [employer] activities, independence with ADLs/IADLs and increased strength      Date of onset:  9/5/2020    Date of Surgery:  None    History of Present Episode: 10/29/2020  Daniel Booth states his low back and neck region have flared up  He reports no recent history or trauma or injury  He did fall off a ladder about 6 months ago and his back has been flared up since that incident  Past Medical History:    10/29/2020  Daniel Booth reports left shoulder surgery  B TKR  Neck fusion C3-5  Muscle stimulator in his back  Previous Level of Functional Ability:  10/29/2020  Daniel Booth states his back and neck had been doing OK  Inspection / Palpation:  Lumbar:  10/29/2020  Mesomorphic / Endomorphic body type  No signs of infection  No signs of wounds  No signs of drainage  No signs of ecchymotic regions  No signs of erythremic regions  Mild to moderate signs of muscle spasm  Mild to moderate signs of muscle guarding  Mild to moderate signs of tenderness reported to palpation  Mild signs of atrophy noted  No signs of swelling  No signs of a surgery site    Current conditions appears consistent with recent acute   episode  Chief Complaints:  10/29/2020  Bjorn reports moderate to severe difficulty with standing  Collette Arnett reports moderate to severe difficulty with walking  Collette Arnett reports moderate difficulty with movement / use of his lumbar region  Collette Arnett reports moderate difficulty with use of stairs  Collette Arnett reports severe difficulty with running  Collette Arnett reports severe difficulty with jumping  Collette Arnett reports moderate difficulty with use of inclines  Collette Arnett reports moderate to severe difficulty with sleeping  Collette Arnett reports mild to moderate difficulty with his strength and endurance  Collette Arnett reports mild to moderate limitations with his range of motion  Collette Arnett reports mild to moderate difficulty lying on his lumbar region  Collette Arnett reports mild to moderate difficulty twisting / turning his lumbar region      LUMBAR PAIN     Resting Palpation Bending Extending Walking Lifting   10/29/2020 3 3-5 5-7 3-5 5-10 3-7   12/8/2020 2 2-4 3-5 2-4 3-8 2-6   1/12/2021 1-2 1-3 2-4 1-3 2-7 2-5     LUMBAR PAIN     Pulling Pushing Sleeping Twisting Standing   10/29/2020 3-8 3-8 5-10 5-10 5-10   12/8/2020 2-7 2-7 4-8 4-8 4-8   1/12/2021 2-6 2-6 3-7 3-7 3-7     LUMBAR AROM Flexion Extension Rotation Right   10/29/2020 45° 6° 32°   12/8/2020 52° 8° 41°   1/12/2021 58° 9° 43°     LUMBAR AROM Rotation Left Side Bending Right Side Bending Left   10/29/2020 31° 24° 25°   12/8/2020 35° 30° 31°   1/12/2021 39° 34° 34°     LUMBAR MMT / PAIN Flexion Extension Rotation Right   10/29/2020 0/10  19 lbs 0/10  20 lbs 0/10  18 lbs   12/8/2020 0/10  24 lbs 0/10  24 lbs 0/10  23 lbs   1/12/2021 0/10  28 lbs 0/10  28 lbs 0/10  26 lbs     LUMBAR MMT / PAIN Rotation Left Side Bend Right Side Bend Left   10/29/2020 0/10  19 lbs 0/10  20 lbs 0/10  21 lbs   12/8/2020 0/10  23 lbs 0/10  24 lbs 0/10  25 lbs   1/12/2021 0/10  26 lbs 0/10  28 lbs 0/10  28 lbs     LE MMT / PAIN Dorsiflexion Plantarflexion Knee flexion Knee extension   10/29/2020 Rt 5/10  24 lbs 5/10  28 lbs 0/10  31 lbs 0/10  32 lbs   10/29/2020 Lt 0/10  31 lbs 0/10  32 lbs 0/10  35 lbs 0/10  36 lbs     LE MMT / PAIN Hip Flexion Hip Abduction Hip Adduction   10/29/2020  Rt 5/10  10 lbs 0/10  15 lbs 0/10  14 lbs   10/29/2020  Lt 4/10  18 lbs 0/10  21 lbs 0/10  22 lbs     LUMBAR SCREEN Referred Pain Sacroiliac Joint test Squish Test Straight Leg  Raise   10/29/2020 Rt Negative Negative Negative Negative   10/29/2020 Lt Negative Negative Negative Negative     LUMBAR SCREEN Valsalva Gapping Bowstring sign Hip Bursitis   10/29/2020 Rt Negative Negative Negative Negative   10/29/2020 Lt Negative Negative Negative Negative     Precautions:  Lumbar and Neck Issues

## 2021-01-12 NOTE — PROGRESS NOTES
Today's date: 2021  Patient name: Dorie Thompson  : 1950  MRN: [id number]  Referring provider: Marisa Savage MD  Dx:   Encounter Diagnosis     ICD-10-CM    1  Lumbar radiculopathy  M54 16    2  Difficulty walking  R26 2    3  Neck pain  M54 2    4  Aftercare following surgery  Z48 89    5  Acute pain of left shoulder  M25 512      Subjective:  Patient voices that he is pleased with his progress  "I spend half of the day off of my feet  I also stretch at home and I go lay on my inversion table twice a week  I actually heard my spine crack twice this week which felt good  When I got up this morning I was able to get right up and go without having to try to move for 15 minutes "    Objective: See treatment log below  Bee Monahan continues to be advised to follow his home exercise program as tolerated  When Bee Monahan is feeling good he follows his rehab exercises in the gym and on other days he follows his home exercise program at home as tolerated  In the future we plan on having Bjorn stay at home and follow his home exercise program for four to six weeks and than assess for either more Rehab treatments or discharge from Rehab care  Assessment: Tolerated treatment well  Patient exhibited good technique with therapeutic exercises and would benefit from continued PT  Bjorn's goals are to continue to improve with his rehab program and improve with functional mobility, speed, repetition and decreased c/o pain with his gym and home exercise program   Bjorn's final goal for his rehab is to be discharged from Rehab care after obtaining his full functional rehab potential     Plan: Continue per plan of care  Progress treatment as tolerated         Precautions:  Lumbar and Neck Issues    All treatments below will be provided with a focus on strengthening, flexibility, ROM, postural,   endurance and any possible swelling and pain which may be present without ignoring   neural issues involving balance, coordination and proprioception which is also important   and necessary to provide full functional mobility and quality care        Daily Treatment Log  Manual  1/5 1/7 1/12     MT, ROM 30' 30' 35'     HEP        Exercise Log 1/5 1/7 1/12     Balance Board        Chair Squats        BOSU-Walk        Foam Pad SLR,Hip/KneeFl,Step Ups        Foam Beam        P-Bar-GT-Forward, Backward,Side-Even & Dips        Monster Steps        Fitter-Slalom        T/G-Squats,PF        W/P-PNF,IR,ER,PU,PS:Top,Mid,Bot        NuStep NT 10' L1 10' L5     Maria R-BP,Lats,PD,Row        Maria R-TrunkFlex/Ext 45# 30x 45# 30x 60# 30x     Quwrpvb-BS-Zg 2x2' 2x2' 2x2'     NK Table        TM        [employer]        Bike        Mat-GreenBallDKTC,HipRot,Bridges        ME, PE 13' 15' 15'             Modalities 1/5 1/7 1/12     MH / PE / ES 20' 10' 20'

## 2021-01-13 ENCOUNTER — TRANSCRIBE ORDERS (OUTPATIENT)
Dept: PHYSICAL THERAPY | Facility: CLINIC | Age: 71
End: 2021-01-13

## 2021-01-13 DIAGNOSIS — M54.2 NECK PAIN: ICD-10-CM

## 2021-01-13 DIAGNOSIS — R26.2 DIFFICULTY WALKING: ICD-10-CM

## 2021-01-13 DIAGNOSIS — M54.16 LUMBAR RADICULOPATHY: Primary | ICD-10-CM

## 2021-01-13 NOTE — PROGRESS NOTES
PT Re-Evaluation   Today's date: 2021    Patient name: Dorie Thompson  : 1950  MRN: [id number]  Referring provider: Marisa Savage MD  Dx:   Encounter Diagnosis     ICD-10-CM    1  Lumbar radiculopathy  M54 16    2  Difficulty walking  R26 2    3  Neck pain  M54 2      Assessment  Assessment details: Patient is feeling better  He is standing longer and walking further  He is sleeping better  He is very careful with his chores and other activities  Impairments: abnormal or restricted ROM, abnormal movement, activity intolerance, impaired physical strength, pain with function and safety issue  Understanding of Dx/Px/POC: excellent   Prognosis: good    Goals  STG  2-4 weeks:   Increase lumbar spine AROM by 2-4 degrees  Increase lower extremity strength by 2-4 lbs in all weak areas  Improve sitting posture and body mechanics  Increase standing/ADL tolerance minutes  Decrease pain by 25-50% with standing, walking, and stairs  Initiate HEP  LTG  6-8 weeks:   Demonstrate normal lumbar rotation  Decrease pain to 1-2/10 with activity  Increase lower extremity strength by 10-20 lbs in all weak areas  Improve spinal stability  Improve gait pattern and balance  Decrease limitations with standing, walking and ADL's  DC with HEP  Plan  Plan details: All planned modality interventions and planned therapy interventions are provided PRN    Patient would benefit from: PT eval and skilled physical therapy  Planned modality interventions: TENS, ultrasound and unattended electrical stimulation  Planned therapy interventions: joint mobilization, manual therapy, neuromuscular re-education, patient education, postural training, self care, strengthening, stretching, therapeutic activities, therapeutic exercise, therapeutic training, balance, balance/weight bearing training, coordination, flexibility, transfer training, gait training and graded exercise  Frequency: 3x week  Duration in weeks: 12  Treatment plan discussed with: patient      Subjective Evaluation    Pain  Quality: discomfort, knife-like, pulling, squeezing, tight, sharp and radiating  Relieving factors: support, rest, relaxation and change in position  Aggravating factors: lifting, running, stair climbing, walking and standing  Progression: improved    Treatments  Previous treatment: physical therapy  Current treatment: physical therapy  Patient Goals  Patient goals for therapy: decreased pain, improved balance, increased motion, return to work, return to [employer] activities, independence with ADLs/IADLs and increased strength      Date of onset:  9/5/2020    Date of Surgery:  None    History of Present Episode: 10/29/2020  Keke Pollock states his low back and neck region have flared up  He reports no recent history or trauma or injury  He did fall off a ladder about 6 months ago and his back has been flared up since that incident  Past Medical History:    10/29/2020  Keke Pollock reports left shoulder surgery  B TKR  Neck fusion C3-5  Muscle stimulator in his back  Previous Level of Functional Ability:  10/29/2020  Keke Pollock states his back and neck had been doing OK  Inspection / Palpation:  Lumbar:  10/29/2020  Mesomorphic / Endomorphic body type  No signs of infection  No signs of wounds  No signs of drainage  No signs of ecchymotic regions  No signs of erythremic regions  Mild to moderate signs of muscle spasm  Mild to moderate signs of muscle guarding  Mild to moderate signs of tenderness reported to palpation  Mild signs of atrophy noted  No signs of swelling  No signs of a surgery site  Current conditions appears consistent with recent acute   episode  Chief Complaints:  10/29/2020  Bjorn reports moderate to severe difficulty with standing  Keke Pollock reports moderate to severe difficulty with walking  Keke Pollock reports moderate difficulty with movement / use of his lumbar region    Keke Pollock reports moderate difficulty with use of stairs  Sher Grandchild reports severe difficulty with running  Sher Grandchild reports severe difficulty with jumping  Ladoris Grandchild reports moderate difficulty with use of inclines  Ladoris Grandchild reports moderate to severe difficulty with sleeping  Sher Grandchild reports mild to moderate difficulty with his strength and endurance  Sher Grandchild reports mild to moderate limitations with his range of motion  Sher Grandchild reports mild to moderate difficulty lying on his lumbar region  Sher Grandchild reports mild to moderate difficulty twisting / turning his lumbar region      LUMBAR PAIN     Resting Palpation Bending Extending Walking Lifting   10/29/2020 3 3-5 5-7 3-5 5-10 3-7   12/8/2020 2 2-4 3-5 2-4 3-8 2-6   1/12/2021 1-2 1-3 2-4 1-3 2-7 2-5     LUMBAR PAIN     Pulling Pushing Sleeping Twisting Standing   10/29/2020 3-8 3-8 5-10 5-10 5-10   12/8/2020 2-7 2-7 4-8 4-8 4-8   1/12/2021 2-6 2-6 3-7 3-7 3-7     LUMBAR AROM Flexion Extension Rotation Right   10/29/2020 45° 6° 32°   12/8/2020 52° 8° 41°   1/12/2021 58° 9° 43°     LUMBAR AROM Rotation Left Side Bending Right Side Bending Left   10/29/2020 31° 24° 25°   12/8/2020 35° 30° 31°   1/12/2021 39° 34° 34°     LUMBAR MMT / PAIN Flexion Extension Rotation Right   10/29/2020 0/10  19 lbs 0/10  20 lbs 0/10  18 lbs   12/8/2020 0/10  24 lbs 0/10  24 lbs 0/10  23 lbs   1/12/2021 0/10  28 lbs 0/10  28 lbs 0/10  26 lbs     LUMBAR MMT / PAIN Rotation Left Side Bend Right Side Bend Left   10/29/2020 0/10  19 lbs 0/10  20 lbs 0/10  21 lbs   12/8/2020 0/10  23 lbs 0/10  24 lbs 0/10  25 lbs   1/12/2021 0/10  26 lbs 0/10  28 lbs 0/10  28 lbs     LE MMT / PAIN Dorsiflexion Plantarflexion Knee flexion Knee extension   10/29/2020 Rt 5/10  24 lbs 5/10  28 lbs 0/10  31 lbs 0/10  32 lbs   10/29/2020 Lt 0/10  31 lbs 0/10  32 lbs 0/10  35 lbs 0/10  36 lbs     LE MMT / PAIN Hip Flexion Hip Abduction Hip Adduction   10/29/2020  Rt 5/10  10 lbs 0/10  15 lbs 0/10  14 lbs   10/29/2020  Lt 4/10  18 lbs 0/10  21 lbs 0/10  22 lbs     LUMBAR SCREEN Referred Pain Sacroiliac Joint test Squish Test Straight Leg  Raise   10/29/2020 Rt Negative Negative Negative Negative   10/29/2020 Lt Negative Negative Negative Negative     LUMBAR SCREEN Valsalva Gapping Bowstring sign Hip Bursitis   10/29/2020 Rt Negative Negative Negative Negative   10/29/2020 Lt Negative Negative Negative Negative     Precautions:  Lumbar and Neck Issues

## 2021-01-14 ENCOUNTER — OFFICE VISIT (OUTPATIENT)
Dept: PHYSICAL THERAPY | Facility: CLINIC | Age: 71
End: 2021-01-14
Payer: MEDICARE

## 2021-01-14 DIAGNOSIS — R26.2 DIFFICULTY WALKING: ICD-10-CM

## 2021-01-14 DIAGNOSIS — M25.512 ACUTE PAIN OF LEFT SHOULDER: ICD-10-CM

## 2021-01-14 DIAGNOSIS — Z48.89 AFTERCARE FOLLOWING SURGERY: ICD-10-CM

## 2021-01-14 DIAGNOSIS — M54.16 LUMBAR RADICULOPATHY: Primary | ICD-10-CM

## 2021-01-14 DIAGNOSIS — M54.2 NECK PAIN: ICD-10-CM

## 2021-01-14 PROCEDURE — 97112 NEUROMUSCULAR REEDUCATION: CPT

## 2021-01-14 PROCEDURE — 97014 ELECTRIC STIMULATION THERAPY: CPT

## 2021-01-14 PROCEDURE — 97140 MANUAL THERAPY 1/> REGIONS: CPT

## 2021-01-14 NOTE — PROGRESS NOTES
Today's date: 2021  Patient name: Morales Plata  : 1950  MRN: [id number]  Referring provider: Khai Peck MD  Dx:   Encounter Diagnosis     ICD-10-CM    1  Lumbar radiculopathy  M54 16    2  Difficulty walking  R26 2    3  Neck pain  M54 2    4  Aftercare following surgery  Z48 89    5  Acute pain of left shoulder  M25 512      Subjective:  No new c/o pain today  Objective: See treatment log below  Radha Sadler continues to be advised to follow his home exercise program as tolerated  When Radha Sadler is feeling good he follows his rehab exercises in the gym and on other days he follows his home exercise program at home as tolerated  In the future we plan on having Bjorn stay at home and follow his home exercise program for four to six weeks and than assess for either more Rehab treatments or discharge from Rehab care  Assessment: Tolerated treatment well  Patient exhibited good technique with therapeutic exercises and would benefit from continued PT  Bjorn's goals are to continue to improve with his rehab program and improve with functional mobility, speed, repetition and decreased c/o pain with his gym and home exercise program   Bjorn's final goal for his rehab is to be discharged from Rehab care after obtaining his full functional rehab potential     Plan: Continue per plan of care  Progress treatment as tolerated  Precautions:  Lumbar and Neck Issues    All treatments below will be provided with a focus on strengthening, flexibility, ROM, postural,   endurance and any possible swelling and pain which may be present without ignoring   neural issues involving balance, coordination and proprioception which is also important   and necessary to provide full functional mobility and quality care        Daily Treatment Log  Manual      MT, ROM 30' 30' 35' 35'    HEP        Exercise Log     Balance Board        Chair Squats        BOSU-Walk Foam Pad SLR,Hip/KneeFl,Step Ups        Foam Beam        P-Bar-GT-Forward, Backward,Side-Even & Dips        Monster Steps        Fitter-Slalom        T/G-Squats,PF        W/P-PNF,IR,ER,PU,PS:Top,Mid,Bot        NuStep NT 10' L1 10' L5 NT    Maria R-BP,Lats,PD,Row        Maria R-TrunkFlex/Ext 45# 30x 45# 30x 60# 30x 65# 30x    Bkbyizv-MB-Lp 2x2' 2x2' 2x2' 2x2'    NK Table        TM        UBC        Bike        Mat-GreenBallDKTC,HipRot,Bridges        ME, PE 15' 15' 15' 15'            Modalities 1/5 1/7 1/12 1/14    MH / PE / ES 20' 10' 20' 20'

## 2021-01-19 ENCOUNTER — OFFICE VISIT (OUTPATIENT)
Dept: PHYSICAL THERAPY | Facility: CLINIC | Age: 71
End: 2021-01-19
Payer: MEDICARE

## 2021-01-19 DIAGNOSIS — M54.16 LUMBAR RADICULOPATHY: Primary | ICD-10-CM

## 2021-01-19 DIAGNOSIS — Z48.89 AFTERCARE FOLLOWING SURGERY: ICD-10-CM

## 2021-01-19 DIAGNOSIS — R26.2 DIFFICULTY WALKING: ICD-10-CM

## 2021-01-19 DIAGNOSIS — M54.2 NECK PAIN: ICD-10-CM

## 2021-01-19 DIAGNOSIS — M25.512 ACUTE PAIN OF LEFT SHOULDER: ICD-10-CM

## 2021-01-19 PROCEDURE — 97014 ELECTRIC STIMULATION THERAPY: CPT

## 2021-01-19 PROCEDURE — 97140 MANUAL THERAPY 1/> REGIONS: CPT

## 2021-01-19 PROCEDURE — 97112 NEUROMUSCULAR REEDUCATION: CPT

## 2021-01-19 NOTE — PROGRESS NOTES
Today's date: 2021  Patient name: Bridget Quintero  : 1950  MRN: [id number]  Referring provider: Vibha Yu MD  Dx:   Encounter Diagnosis     ICD-10-CM    1  Lumbar radiculopathy  M54 16    2  Difficulty walking  R26 2    3  Neck pain  M54 2    4  Aftercare following surgery  Z48 89    5  Acute pain of left shoulder  M25 512      Subjective:  "I'm having a lot of discomfort today  I can only stand up for a short amount of time  I could barely walk yesterday  I'm not really sure what happened  I did my errands and then everything just tightened up  My R leg felt like it went up and I have pain across my back and down my L leg  I had to just sit down, I couldn't stand "  Patient reports post therapy, "I feel a thousand times better than when I walked in here today "    Objective: See treatment log below  Kalpana Schulte continues to be advised to follow his home exercise program as tolerated  When Kalpana Schulte is feeling good he follows his rehab exercises in the gym and on other days he follows his home exercise program at home as tolerated  In the future we plan on having Bjorn stay at home and follow his home exercise program for four to six weeks and than assess for either more Rehab treatments or discharge from Rehab care  Assessment: Tolerated treatment fair  Patient exhibited good technique with therapeutic exercises and would benefit from continued PT  Bjorn's goals are to continue to improve with his rehab program and improve with functional mobility, speed, repetition and decreased c/o pain with his gym and home exercise program   Bjorn's final goal for his rehab is to be discharged from Rehab care after obtaining his full functional rehab potential     Plan: Continue per plan of care  Progress treatment as tolerated         Precautions:  Lumbar and Neck Issues    All treatments below will be provided with a focus on strengthening, flexibility, ROM, postural,   endurance and any possible swelling and pain which may be present without ignoring   neural issues involving balance, coordination and proprioception which is also important   and necessary to provide full functional mobility and quality care        Daily Treatment Log  Manual  1/5 1/7 1/12 1/14 1/19   MT, ROM 30' 30' 35' 35' 35'   HEP        Exercise Log 1/5 1/7 1/12 1/14 1/19   Balance Board        Chair Squats        BOSU-Walk        Foam Pad SLR,Hip/KneeFl,Step Ups        Foam Beam        P-Bar-GT-Forward, Backward,Side-Even & Dips        Monster Steps        Fitter-Slalom        T/G-Squats,PF        W/P-PNF,IR,ER,PU,PS:Top,Mid,Bot        NuStep NT 10' L1 10' L5 NT 10'L5   Maria R-BP,Lats,PD,Row        Maria R-TrunkFlex/Ext 45# 30x 45# 30x 60# 30x 65# 30x NT   Upcijmj-AA-Pl 2x2' 2x2' 2x2' 2x2' 2x2'   NK Table        TM        UBC        Bike        Mat-GreenBallDKTC,HipRot,Bridges        [place], PE 15' 15' 15' 15' 15'           Modalities 1/5 1/7 1/12 1/14 1/19   MH / PE / ES 20' 10' 20' 20' 20'

## 2021-01-21 ENCOUNTER — OFFICE VISIT (OUTPATIENT)
Dept: PHYSICAL THERAPY | Facility: CLINIC | Age: 71
End: 2021-01-21
Payer: MEDICARE

## 2021-01-21 DIAGNOSIS — M54.16 LUMBAR RADICULOPATHY: Primary | ICD-10-CM

## 2021-01-21 DIAGNOSIS — M25.512 ACUTE PAIN OF LEFT SHOULDER: ICD-10-CM

## 2021-01-21 DIAGNOSIS — M54.2 NECK PAIN: ICD-10-CM

## 2021-01-21 DIAGNOSIS — R26.2 DIFFICULTY WALKING: ICD-10-CM

## 2021-01-21 DIAGNOSIS — Z48.89 AFTERCARE FOLLOWING SURGERY: ICD-10-CM

## 2021-01-21 PROCEDURE — 97112 NEUROMUSCULAR REEDUCATION: CPT

## 2021-01-21 NOTE — PROGRESS NOTES
Today's date: 2021  Patient name: Oliver Barrios  : 1950  MRN: [id number]  Referring provider: Troy Gabriel MD  Dx:   Encounter Diagnosis     ICD-10-CM    1  Lumbar radiculopathy  M54 16    2  Difficulty walking  R26 2    3  Neck pain  M54 2    4  Aftercare following surgery  Z48 89    5  Acute pain of left shoulder  M25 512      Subjective:  No new c/o pain today  Objective: See treatment log below  Harvinder Lombardi continues to be advised to follow his home exercise program as tolerated  When Harvinder Lombardi is feeling good he follows his rehab exercises in the gym and on other days he follows his home exercise program at home as tolerated  In the future we plan on having Bjorn stay at home and follow his home exercise program for four to six weeks and than assess for either more Rehab treatments or discharge from Rehab care  Assessment: Tolerated treatment well  Patient exhibited good technique with therapeutic exercises and would benefit from continued PT  Bjorn's goals are to continue to improve with his rehab program and improve with functional mobility, speed, repetition and decreased c/o pain with his gym and home exercise program   Bjorn's final goal for his rehab is to be discharged from Rehab care after obtaining his full functional rehab potential     Plan: Continue per plan of care  Progress treatment as tolerated  Precautions:  Lumbar and Neck Issues    All treatments below will be provided with a focus on strengthening, flexibility, ROM, postural,   endurance and any possible swelling and pain which may be present without ignoring   neural issues involving balance, coordination and proprioception which is also important   and necessary to provide full functional mobility and quality care        Daily Treatment Log  Manual     MT, ROM 35'    35'   HEP        Exercise Log    Balance Board        Chair Squats        BOSU-Walk        Foam Pad SLR,Hip/KneeFl,Step Ups        Foam Beam        P-Bar-GT-Forward, Backward,Side-Even & Dips        Monster Steps        Fitter-Slalom        T/G-Squats,PF        W/P-PNF,IR,ER,PU,PS:Top,Mid,Bot        NuStep 10'L5    10'L5   Maria R-BP,Lats,PD,Row        Maria R-TrunkFlex/Ext 65# 30x    NT   Crewzvu-JA-Xd 2x2'    2x2'   NK Table        TM        UBC        Bike        Mat-GreenBallDKTC,HipRot,Bridges        ME, PE 15'    15'           Modalities 1/21 1/19   [address] / [place] / ES 20'    20'

## 2021-01-26 ENCOUNTER — OFFICE VISIT (OUTPATIENT)
Dept: PHYSICAL THERAPY | Facility: CLINIC | Age: 71
End: 2021-01-26
Payer: MEDICARE

## 2021-01-26 DIAGNOSIS — M54.2 NECK PAIN: ICD-10-CM

## 2021-01-26 DIAGNOSIS — M25.512 ACUTE PAIN OF LEFT SHOULDER: ICD-10-CM

## 2021-01-26 DIAGNOSIS — M54.16 LUMBAR RADICULOPATHY: Primary | ICD-10-CM

## 2021-01-26 DIAGNOSIS — Z48.89 AFTERCARE FOLLOWING SURGERY: ICD-10-CM

## 2021-01-26 DIAGNOSIS — R26.2 DIFFICULTY WALKING: ICD-10-CM

## 2021-01-26 PROCEDURE — 97014 ELECTRIC STIMULATION THERAPY: CPT

## 2021-01-26 PROCEDURE — 97112 NEUROMUSCULAR REEDUCATION: CPT

## 2021-01-26 PROCEDURE — 97140 MANUAL THERAPY 1/> REGIONS: CPT

## 2021-01-26 NOTE — PROGRESS NOTES
Today's date: 2021  Patient name: Elliott Oviedo  : 1950  MRN: [id number]  Referring provider: Keily Li MD  Dx:   Encounter Diagnosis     ICD-10-CM    1  Lumbar radiculopathy  M54 16    2  Difficulty walking  R26 2    3  Neck pain  M54 2    4  Aftercare following surgery  Z48 89    5  Acute pain of left shoulder  M25 512      Subjective:  No new c/o pain today  Objective: See treatment log below  Caleb Cuello continues to be advised to follow his home exercise program as tolerated  When Caleb Cuello is feeling good he follows his rehab exercises in the gym and on other days he follows his home exercise program at home as tolerated  In the future we plan on having Bjorn stay at home and follow his home exercise program for four to six weeks and than assess for either more Rehab treatments or discharge from Rehab care  Assessment: Tolerated treatment well  Patient exhibited good technique with therapeutic exercises and would benefit from continued PT  Bjorn's goals are to continue to improve with his rehab program and improve with functional mobility, speed, repetition and decreased c/o pain with his gym and home exercise program   Bjorn's final goal for his rehab is to be discharged from Rehab care after obtaining his full functional rehab potential     Plan: Continue per plan of care  Progress treatment as tolerated  Precautions:  Lumbar and Neck Issues    All treatments below will be provided with a focus on strengthening, flexibility, ROM, postural,   endurance and any possible swelling and pain which may be present without ignoring   neural issues involving balance, coordination and proprioception which is also important   and necessary to provide full functional mobility and quality care        Daily Treatment Log  Manual        MT, ROM 35' 35'      HEP        Exercise Log       Balance Board        Chair Squats        BOSU-Walk        Foam Pad SLR,Hip/KneeFl,Step Ups        Foam Beam        P-Bar-GT-Forward, Backward,Side-Even & Dips        Monster Steps        Fitter-Slalom        T/G-Squats,PF        W/P-PNF,IR,ER,PU,PS:Top,Mid,Bot        NuStep 10'L5 10'L5      Maria R-BP,Lats,PD,Row        Maria R-TrunkFlex/Ext 65# 30x 65# 30x      Jalojrt-KB-Wt 2x2' 2x2'      NK Table        TM        UBC        Bike        Mat-GreenBallDKTC,HipRot,Bridges        ME, PE 15' 15'              Modalities 1/21 1/26       / [place] / [place] 20' 20'

## 2021-01-28 ENCOUNTER — OFFICE VISIT (OUTPATIENT)
Dept: PHYSICAL THERAPY | Facility: CLINIC | Age: 71
End: 2021-01-28
Payer: MEDICARE

## 2021-01-28 DIAGNOSIS — M25.512 ACUTE PAIN OF LEFT SHOULDER: ICD-10-CM

## 2021-01-28 DIAGNOSIS — Z48.89 AFTERCARE FOLLOWING SURGERY: ICD-10-CM

## 2021-01-28 DIAGNOSIS — M54.16 LUMBAR RADICULOPATHY: Primary | ICD-10-CM

## 2021-01-28 DIAGNOSIS — R26.2 DIFFICULTY WALKING: ICD-10-CM

## 2021-01-28 DIAGNOSIS — M54.2 NECK PAIN: ICD-10-CM

## 2021-01-28 PROCEDURE — 97140 MANUAL THERAPY 1/> REGIONS: CPT

## 2021-01-28 PROCEDURE — 97112 NEUROMUSCULAR REEDUCATION: CPT

## 2021-01-28 NOTE — PROGRESS NOTES
Today's date: 2021  Patient name: Morales Plata  : 1950  MRN: [id number]  Referring provider: Khai Peck MD  Dx:   Encounter Diagnosis     ICD-10-CM    1  Lumbar radiculopathy  M54 16    2  Difficulty walking  R26 2    3  Neck pain  M54 2    4  Aftercare following surgery  Z48 89    5  Acute pain of left shoulder  M25 512      Subjective:  No new c/o pain today  Objective: See treatment log below  Radha Sadler continues to be advised to follow his home exercise program as tolerated  When Radha Sadler is feeling good he follows his rehab exercises in the gym and on other days he follows his home exercise program at home as tolerated  In the future we plan on having Bjorn stay at home and follow his home exercise program for four to six weeks and than assess for either more Rehab treatments or discharge from Rehab care  Assessment: Tolerated treatment well  Patient exhibited good technique with therapeutic exercises and would benefit from continued PT  Bjorn's goals are to continue to improve with his rehab program and improve with functional mobility, speed, repetition and decreased c/o pain with his gym and home exercise program   Bjorn's final goal for his rehab is to be discharged from Rehab care after obtaining his full functional rehab potential     Plan: Continue per plan of care  Progress treatment as tolerated  Precautions:  Lumbar and Neck Issues    All treatments below will be provided with a focus on strengthening, flexibility, ROM, postural,   endurance and any possible swelling and pain which may be present without ignoring   neural issues involving balance, coordination and proprioception which is also important   and necessary to provide full functional mobility and quality care        Daily Treatment Log  Manual       MT, ROM 35' 35' 35'     HEP        Exercise Log      Balance Board        Chair Squats        BOSU-Walk        Foam Pad SLR,Hip/KneeFl,Step Ups        Foam Beam        P-Bar-GT-Forward, Backward,Side-Even & Dips        Monster Steps        Fitter-Slalom        T/G-Squats,PF        W/P-PNF,IR,ER,PU,PS:Top,Mid,Bot        NuStep 10'L5 10'L5 10' L5     Maria R-BP,Lats,PD,Row        Maria R-TrunkFlex/Ext 65# 30x 65# 30x NT     Hbthbwp-SC-Lo 2x2' 2x2' 2x2'     NK Table        TM        UBC        Bike        Mat-GreenBallDKTC,HipRot,Bridges        ME, PE 15' 15' 15'             Modalities 1/21 1/26 1/28     MH / PE / ES 20'MH 20' 20'

## 2021-02-02 ENCOUNTER — APPOINTMENT (OUTPATIENT)
Dept: PHYSICAL THERAPY | Facility: CLINIC | Age: 71
End: 2021-02-02
Payer: MEDICARE

## 2021-02-03 ENCOUNTER — OFFICE VISIT (OUTPATIENT)
Dept: PHYSICAL THERAPY | Facility: CLINIC | Age: 71
End: 2021-02-03
Payer: MEDICARE

## 2021-02-03 DIAGNOSIS — R26.2 DIFFICULTY WALKING: ICD-10-CM

## 2021-02-03 DIAGNOSIS — M54.16 LUMBAR RADICULOPATHY: Primary | ICD-10-CM

## 2021-02-03 DIAGNOSIS — M54.2 NECK PAIN: ICD-10-CM

## 2021-02-03 PROCEDURE — 97110 THERAPEUTIC EXERCISES: CPT | Performed by: PHYSICAL THERAPIST

## 2021-02-03 PROCEDURE — 97140 MANUAL THERAPY 1/> REGIONS: CPT | Performed by: PHYSICAL THERAPIST

## 2021-02-03 PROCEDURE — 97112 NEUROMUSCULAR REEDUCATION: CPT | Performed by: PHYSICAL THERAPIST

## 2021-02-03 PROCEDURE — 97014 ELECTRIC STIMULATION THERAPY: CPT | Performed by: PHYSICAL THERAPIST

## 2021-02-03 NOTE — PROGRESS NOTES
Today's date: 2/3/2021  Patient name: Olga Booker  : 1950  MRN: [id number]  Referring provider: Juhi Coto MD  Dx:   Encounter Diagnosis     ICD-10-CM    1  Lumbar radiculopathy  M54 16    2  Difficulty walking  R26 2    3  Neck pain  M54 2      Subjective:  Jed Melendez states his low back is feeling better  Objective: See treatment log below  Jed Melendez continues to be advised to follow his home exercise program as tolerated  When Jed Melendez is feeling good he follows his rehab exercises in the gym and on other days he follows his home exercise program at home as tolerated  In the future we plan on having Bjorn stay at home and follow his home exercise program for four to six weeks and than assess for either more Rehab treatments or discharge from Rehab care  Assessment: Tolerated treatment well  Patient exhibited good technique with therapeutic exercises and would benefit from continued PT  Bjorn's goals are to continue to improve with his rehab program and improve with functional mobility, speed, repetition and decreased c/o pain with his gym and home exercise program   Bjorn's final goal for his rehab is to be discharged from Rehab care after obtaining his full functional rehab potential     Plan: Continue per plan of care  Progress treatment as tolerated  Precautions:  Lumbar and Neck Issues    All treatments below will be provided with a focus on strengthening, flexibility, ROM, postural,   endurance and any possible swelling and pain which may be present without ignoring   neural issues involving balance, coordination and proprioception which is also important   and necessary to provide full functional mobility and quality care        Daily Treatment Log  Manual   2/3    MT, ROM 35' 35' 35' 30'    HEP        Exercise Log  2/3    Balance Board        Chair Squats        BOSU-Walk        Foam Pad SLR,Hip/KneeFl,Step Ups        Foam Beam P-Bar-GT-Forward, Backward,Side-Even & Dips        Monster Steps        Fitter-Slalom        T/G-Squats,PF        W/P-PNF,IR,ER,PU,PS:Top,Mid,Bot        NuStep 10'L5 10'L5 10' L5 10' L5    Maria R-BP,Lats,PD,Row        Maria R-TrunkFlex/Ext 65# 30x 65# 30x NT 65#-30x    Jenkynl-XS-Hc 2x2' 2x2' 2x2' 2x2'    NK Table        TM        UBC        Bike        Mat-GreenBallDKTC,HipRot,Bridges        ME, PE 15' 15' 15' 15'            Modalities 1/21 1/26 1/28 2/3    MH / PE / ES 20'MH 20' 20' 20'

## 2021-02-04 ENCOUNTER — OFFICE VISIT (OUTPATIENT)
Dept: PHYSICAL THERAPY | Facility: CLINIC | Age: 71
End: 2021-02-04
Payer: MEDICARE

## 2021-02-04 DIAGNOSIS — R26.2 DIFFICULTY WALKING: ICD-10-CM

## 2021-02-04 DIAGNOSIS — M54.2 NECK PAIN: ICD-10-CM

## 2021-02-04 DIAGNOSIS — Z48.89 AFTERCARE FOLLOWING SURGERY: ICD-10-CM

## 2021-02-04 DIAGNOSIS — M25.512 ACUTE PAIN OF LEFT SHOULDER: ICD-10-CM

## 2021-02-04 DIAGNOSIS — M54.16 LUMBAR RADICULOPATHY: Primary | ICD-10-CM

## 2021-02-04 PROCEDURE — 97014 ELECTRIC STIMULATION THERAPY: CPT

## 2021-02-04 PROCEDURE — 97112 NEUROMUSCULAR REEDUCATION: CPT

## 2021-02-04 PROCEDURE — 97140 MANUAL THERAPY 1/> REGIONS: CPT

## 2021-02-04 NOTE — PROGRESS NOTES
Today's date: 2021  Patient name: Juancho Redmond  : 1950  MRN: [id number]  Referring provider: Rahul Gilliland MD  Dx:   Encounter Diagnosis     ICD-10-CM    1  Lumbar radiculopathy  M54 16    2  Difficulty walking  R26 2    3  Neck pain  M54 2    4  Aftercare following surgery  Z48 89    5  Acute pain of left shoulder  M25 512      Subjective:  No new c/o pain today  Objective: See treatment log below  Sid Beach continues to be advised to follow his home exercise program as tolerated  When Sid Beach is feeling good he follows his rehab exercises in the gym and on other days he follows his home exercise program at home as tolerated  In the future we plan on having Bjorn stay at home and follow his home exercise program for four to six weeks and than assess for either more Rehab treatments or discharge from Rehab care  Assessment: Tolerated treatment well  Patient exhibited good technique with therapeutic exercises and would benefit from continued PT  Bjorn's goals are to continue to improve with his rehab program and improve with functional mobility, speed, repetition and decreased c/o pain with his gym and home exercise program   Bjorn's final goal for his rehab is to be discharged from Rehab care after obtaining his full functional rehab potential     Plan: Continue per plan of care  Progress treatment as tolerated  Precautions:  Lumbar and Neck Issues    All treatments below will be provided with a focus on strengthening, flexibility, ROM, postural,   endurance and any possible swelling and pain which may be present without ignoring   neural issues involving balance, coordination and proprioception which is also important   and necessary to provide full functional mobility and quality care        Daily Treatment Log  Manual  1/21 1/26 1/28 2/3 2/   MT, ROM 35' 35' 35' 30' 35'   HEP        Exercise Log 1/21 1/26 1/28 2/3 2   Balance Board        Chair Squats        BOSU-Walk Foam Pad SLR,Hip/KneeFl,Step Ups        Foam Beam        P-Bar-GT-Forward, Backward,Side-Even & Dips        Monster Steps        Fitter-Slalom        T/G-Squats,PF        W/P-PNF,IR,ER,PU,PS:Top,Mid,Bot        NuStep 10'L5 10'L5 10' L5 10' L5 10' L5   Maria R-BP,Lats,PD,Row        Maria R-TrunkFlex/Ext 65# 30x 65# 30x NT 65#-30x 65# 30x   Clxteev-PD-Te 2x2' 2x2' 2x2' 2x2' 2x2'   NK Table        TM        UBC        Bike        Mat-GreenBallDKTC,HipRot,Bridges        ME, PE 15' 15' 15' 15' 15'           Modalities 1/21 1/26 1/28 2/3 2/4   MH / PE / ES 20'MH 20' 20' 20' 20'

## 2021-02-09 ENCOUNTER — OFFICE VISIT (OUTPATIENT)
Dept: PHYSICAL THERAPY | Facility: CLINIC | Age: 71
End: 2021-02-09
Payer: MEDICARE

## 2021-02-09 DIAGNOSIS — M54.2 NECK PAIN: ICD-10-CM

## 2021-02-09 DIAGNOSIS — M54.16 LUMBAR RADICULOPATHY: Primary | ICD-10-CM

## 2021-02-09 DIAGNOSIS — Z48.89 AFTERCARE FOLLOWING SURGERY: ICD-10-CM

## 2021-02-09 DIAGNOSIS — M25.512 ACUTE PAIN OF LEFT SHOULDER: ICD-10-CM

## 2021-02-09 DIAGNOSIS — R26.2 DIFFICULTY WALKING: ICD-10-CM

## 2021-02-09 PROCEDURE — 97014 ELECTRIC STIMULATION THERAPY: CPT

## 2021-02-09 PROCEDURE — 97140 MANUAL THERAPY 1/> REGIONS: CPT

## 2021-02-09 NOTE — PROGRESS NOTES
Today's date: 2021  Patient name: Soren Barrett  : 1950  MRN: [id number]  Referring provider: Ac Bucio MD  Dx:   Encounter Diagnosis     ICD-10-CM    1  Lumbar radiculopathy  M54 16    2  Difficulty walking  R26 2    3  Neck pain  M54 2    4  Aftercare following surgery  Z48 89    5  Acute pain of left shoulder  M25 512      Subjective:  No new c/o pain today  Objective: See treatment log below  Sharron Glass continues to be advised to follow his home exercise program as tolerated  When Sharron Glass is feeling good he follows his rehab exercises in the gym and on other days he follows his home exercise program at home as tolerated  In the future we plan on having Bjorn stay at home and follow his home exercise program for four to six weeks and than assess for either more Rehab treatments or discharge from Rehab care  Assessment: Tolerated treatment well  Patient exhibited good technique with therapeutic exercises and would benefit from continued PT  Bjorn's goals are to continue to improve with his rehab program and improve with functional mobility, speed, repetition and decreased c/o pain with his gym and home exercise program   Bjorn's final goal for his rehab is to be discharged from Rehab care after obtaining his full functional rehab potential     Plan: Continue per plan of care  Progress treatment as tolerated  Precautions:  Lumbar and Neck Issues    All treatments below will be provided with a focus on strengthening, flexibility, ROM, postural,   endurance and any possible swelling and pain which may be present without ignoring   neural issues involving balance, coordination and proprioception which is also important   and necessary to provide full functional mobility and quality care        Daily Treatment Log  Manual     MT, ROM 30'    35'   HEP        Exercise Log    Balance Board        Chair Squats        BOSU-Walk        Foam Pad SLR,Hip/KneeFl,Step Ups        Foam Beam        P-Bar-GT-Forward, Backward,Side-Even & Dips        Monster Steps        Fitter-Slalom        T/G-Squats,PF        W/P-PNF,IR,ER,PU,PS:Top,Mid,Bot        NuStep 10'L5    10' L5   Maria R-BP,Lats,PD,Row        Maria R-TrunkFlex/Ext 65# 30x    65# 30x   Glehrvt-WM-Xw 2x2'    2x2'   NK Table        TM        UBC        Bike        Mat-GreenBallDKTC,HipRot,Bridges        ME, PE 13'    15'           Modalities 2/9    2/4   MH / PE / ES 21'    20'

## 2021-02-11 ENCOUNTER — OFFICE VISIT (OUTPATIENT)
Dept: PHYSICAL THERAPY | Facility: CLINIC | Age: 71
End: 2021-02-11
Payer: MEDICARE

## 2021-02-11 DIAGNOSIS — R26.2 DIFFICULTY WALKING: ICD-10-CM

## 2021-02-11 DIAGNOSIS — M54.16 LUMBAR RADICULOPATHY: Primary | ICD-10-CM

## 2021-02-11 DIAGNOSIS — Z48.89 AFTERCARE FOLLOWING SURGERY: ICD-10-CM

## 2021-02-11 DIAGNOSIS — M54.2 NECK PAIN: ICD-10-CM

## 2021-02-11 DIAGNOSIS — M25.512 ACUTE PAIN OF LEFT SHOULDER: ICD-10-CM

## 2021-02-11 PROCEDURE — 97140 MANUAL THERAPY 1/> REGIONS: CPT | Performed by: PHYSICAL THERAPIST

## 2021-02-11 PROCEDURE — 97112 NEUROMUSCULAR REEDUCATION: CPT

## 2021-02-11 PROCEDURE — 97014 ELECTRIC STIMULATION THERAPY: CPT

## 2021-02-11 NOTE — PROGRESS NOTES
Today's date: 2021  Patient name: Bridget Quintero  : 1950  MRN: [id number]  Referring provider: Vibha Yu MD  Dx:   Encounter Diagnosis     ICD-10-CM    1  Lumbar radiculopathy  M54 16    2  Difficulty walking  R26 2    3  Neck pain  M54 2    4  Aftercare following surgery  Z48 89    5  Acute pain of left shoulder  M25 512      Subjective:  No new c/o pain today  Objective: See treatment log below  Kalpana Schulte continues to be advised to follow his home exercise program as tolerated  When Kalpana Schulte is feeling good he follows his rehab exercises in the gym and on other days he follows his home exercise program at home as tolerated  In the future we plan on having Bjorn stay at home and follow his home exercise program for four to six weeks and than assess for either more Rehab treatments or discharge from Rehab care  Assessment: Tolerated treatment well  Patient exhibited good technique with therapeutic exercises and would benefit from continued PT  Bjorn's goals are to continue to improve with his rehab program and improve with functional mobility, speed, repetition and decreased c/o pain with his gym and home exercise program   Bjorn's final goal for his rehab is to be discharged from Rehab care after obtaining his full functional rehab potential     Plan: Continue per plan of care  Progress treatment as tolerated  Precautions:  Lumbar and Neck Issues    All treatments below will be provided with a focus on strengthening, flexibility, ROM, postural,   endurance and any possible swelling and pain which may be present without ignoring   neural issues involving balance, coordination and proprioception which is also important   and necessary to provide full functional mobility and quality care        Daily Treatment Log  Manual        MT, ROM 30' 20'      HEP        Exercise Log       Balance Board        Chair Squats        BOSU-Walk        Foam Pad SLR,Hip/KneeFl,Step Ups        Foam Beam        P-Bar-GT-Forward, Backward,Side-Even & Dips        Monster Steps        Fitter-Slalom        T/G-Squats,PF        W/P-PNF,IR,ER,PU,PS:Top,Mid,Bot        NuStep 10'L5 10' L5      Maria R-BP,Lats,PD,Row        Maria R-TrunkFlex/Ext 65# 30x 70# 30x      Nxwpnbs-LR-Fx 2x2' 2x2'      NK Table        TM        UBC        Bike        Mat-GreenBallDKTC,HipRot,Bridges        ME, PE 15' 15'              Modalities 2/9 2/11      MH / PE / ES 21' 25'

## 2021-02-16 ENCOUNTER — OFFICE VISIT (OUTPATIENT)
Dept: PHYSICAL THERAPY | Facility: CLINIC | Age: 71
End: 2021-02-16
Payer: MEDICARE

## 2021-02-16 DIAGNOSIS — M25.512 ACUTE PAIN OF LEFT SHOULDER: ICD-10-CM

## 2021-02-16 DIAGNOSIS — R26.2 DIFFICULTY WALKING: ICD-10-CM

## 2021-02-16 DIAGNOSIS — Z48.89 AFTERCARE FOLLOWING SURGERY: ICD-10-CM

## 2021-02-16 DIAGNOSIS — M54.2 NECK PAIN: ICD-10-CM

## 2021-02-16 DIAGNOSIS — M54.16 LUMBAR RADICULOPATHY: Primary | ICD-10-CM

## 2021-02-16 PROCEDURE — 97140 MANUAL THERAPY 1/> REGIONS: CPT

## 2021-02-16 PROCEDURE — 97164 PT RE-EVAL EST PLAN CARE: CPT | Performed by: PHYSICAL THERAPIST

## 2021-02-16 PROCEDURE — 97112 NEUROMUSCULAR REEDUCATION: CPT

## 2021-02-16 PROCEDURE — 97014 ELECTRIC STIMULATION THERAPY: CPT | Performed by: PHYSICAL THERAPIST

## 2021-02-16 NOTE — PROGRESS NOTES
Today's date: 2021  Patient name: Rob Acuna  : 1950  MRN: [id number]  Referring provider: Cleopatra Ortiz MD  Dx:   Encounter Diagnosis     ICD-10-CM    1  Lumbar radiculopathy  M54 16    2  Difficulty walking  R26 2    3  Neck pain  M54 2    4  Aftercare following surgery  Z48 89    5  Acute pain of left shoulder  M25 512      Subjective:  No new c/o pain today  Objective: See treatment log below  Елена Lentz continues to be advised to follow his home exercise program as tolerated  When Елена Lentz is feeling good he follows his rehab exercises in the gym and on other days he follows his home exercise program at home as tolerated  In the future we plan on having Bjorn stay at home and follow his home exercise program for four to six weeks and than assess for either more Rehab treatments or discharge from Rehab care  Assessment: Tolerated treatment well  Patient exhibited good technique with therapeutic exercises and would benefit from continued PT  Bjorn's goals are to continue to improve with his rehab program and improve with functional mobility, speed, repetition and decreased c/o pain with his gym and home exercise program   Bjron's final goal for his rehab is to be discharged from Rehab care after obtaining his full functional rehab potential     Plan: Continue per plan of care  Progress treatment as tolerated  Precautions:  Lumbar and Neck Issues    All treatments below will be provided with a focus on strengthening, flexibility, ROM, postural,   endurance and any possible swelling and pain which may be present without ignoring   neural issues involving balance, coordination and proprioception which is also important   and necessary to provide full functional mobility and quality care        Daily Treatment Log  Manual       MT, ROM 30' 20' 35'     HEP        Exercise Log      Balance Board        Chair Squats        BOSU-Walk        Foam Pad SLR,Hip/KneeFl,Step Ups        Foam Beam        P-Bar-GT-Forward, Backward,Side-Even & Dips        Monster Steps        Fitter-Slalom        T/G-Squats,PF        W/P-PNF,IR,ER,PU,PS:Top,Mid,Bot        NuStep 10'L5 10' L5      Maria R-BP,Lats,PD,Row        Maria R-TrunkFlex/Ext 65# 30x 70# 30x 75# 30x     Jmiwzck-LW-Zq 2x2' 2x2' 2x2'     NK Table        TM        UBC        Bike        Mat-GreenBallDKTC,HipRot,Bridges        ME, PE 15' 15' 15'             Modalities 2/9 2/11 2/16     MH / PE / ES 20' 25' 20'

## 2021-02-16 NOTE — LETTER
2021  PT Reevaluation [address] [facility] MD Jacques  [address]  [facility] [address]    Patient: Dorie Thompson  YOB: 1950   Date of Visit: 2021     Encounter Diagnosis     ICD-10-CM    1  Lumbar radiculopathy  M54 16    2  Difficulty walking  R26 2    3  Neck pain  M54 2    4  Aftercare following surgery  Z48 89    5  Acute pain of left shoulder  M25 512      Dear Dr Mary Franklin:  Thank you for your recent referral of Dorie Thompson    Please review the attached evaluation summary from Bjorn's recent visit  Please verify that you agree with the plan of care by signing the attached order  If you have any questions or concerns, please do not hesitate to call  I sincerely appreciate the opportunity to share in the care of one of your patients and hope to have another opportunity to work with you in the near future  Sincerely,    Claudia Jacobo, PT    Referring Provider:      I certify that I have read the below Plan of Care and certify the need for these services furnished under this plan of treatment while under my care  Gregor Meredith MD  [address]  [facility] [address]  Via Fax: [phone]    Please SIGN ABOVE and return THIS PAGE ONLY to Fax # [phone]        Today's date: 2021  Patient name: Dorie Thompson  : 1950  MRN: [id number]  Referring provider: Marisa Savage MD  Dx:   Encounter Diagnosis     ICD-10-CM    1  Lumbar radiculopathy  M54 16    2  Difficulty walking  R26 2    3  Neck pain  M54 2    4  Aftercare following surgery  Z48 89    5  Acute pain of left shoulder  M25 512      Subjective:  No new c/o pain today  Objective: See treatment log below  Bee Monahan continues to be advised to follow his home exercise program as tolerated  When Bee Monahan is feeling good he follows his rehab exercises in the gym and on other days he follows his home exercise program at home as tolerated    In the future we plan on having Bjorn stay at home and follow his home exercise program for four to six weeks and than assess for either more Rehab treatments or discharge from Rehab care  Assessment: Tolerated treatment well  Patient exhibited good technique with therapeutic exercises and would benefit from continued PT  Bjorn's goals are to continue to improve with his rehab program and improve with functional mobility, speed, repetition and decreased c/o pain with his gym and home exercise program   Bjorn's final goal for his rehab is to be discharged from Rehab care after obtaining his full functional rehab potential     Plan: Continue per plan of care  Progress treatment as tolerated  Precautions:  Lumbar and Neck Issues    All treatments below will be provided with a focus on strengthening, flexibility, ROM, postural,   endurance and any possible swelling and pain which may be present without ignoring   neural issues involving balance, coordination and proprioception which is also important   and necessary to provide full functional mobility and quality care  Daily Treatment Log  Manual  2/9 2/11 2/16     MT, ROM 30' 20' 35'     HEP        Exercise Log 2/9 2/11 2/16     Balance Board        Chair Squats        BOSU-Walk        Foam Pad SLR,Hip/KneeFl,Step Ups        Foam Beam        P-Bar-GT-Forward, Backward,Side-Even & Dips        Monster Steps        Fitter-Slalom        T/G-Squats,PF        W/P-PNF,IR,ER,PU,PS:Top,Mid,Bot        NuStep 10'L5 10' L5      Maria R-BP,Lats,PD,Row        Maria R-TrunkFlex/Ext 65# 30x 70# 30x 75# 30x     Pdfopsg-XZ-Cf 2x2' 2x2' 2x2'     NK Table        TM        [facility]        Bike        Mat-GreenBallDKTC,HipRot,Bridges        ME, PE 15' 15' 15'             Modalities 2/9 2/11 2/16     MH / PE / ES 21' 25' 20'           Attestation signed by Nella Olvera PT at 2/16/2021  3:48 PM:  I supervised the visit    We discussed the case to ensure appropriate continuation and progression of care and I reviewed the documentation  PT Re-Evaluation   Today's date: 2021    Patient name: Lucia Estrada  : 1950  MRN: [id number]  Referring provider: Consuelo Quick MD  Dx:   Encounter Diagnosis     ICD-10-CM    1  Lumbar radiculopathy  M54 16    2  Difficulty walking  R26 2    3  Neck pain  M54 2    4  Aftercare following surgery  Z48 89    5  Acute pain of left shoulder  M25 512      Assessment  Assessment details: Patient states he is getting stronger and stronger  He can laura longer and walk further  He is sleeping better  Impairments: abnormal or restricted ROM, abnormal movement, activity intolerance, impaired balance, impaired physical strength, pain with function, safety issue and weight-bearing intolerance  Understanding of Dx/Px/POC: excellent   Prognosis: good    Goals  STG  2-4 weeks:   Increase lumbar spine AROM by 2-4 degrees  Increase lower extremity strength by 2-4 lbs in all weak areas  Improve sitting posture and body mechanics  Increase standing/ADL tolerance minutes  Decrease pain by 25-50% with standing, walking, and stairs  Initiate HEP  LTG  6-8 weeks:   Demonstrate normal lumbar rotation  Decrease pain to 1-2/10 with activity  Increase lower extremity strength by 10-20 lbs in all weak areas  Improve spinal stability  Improve gait pattern and balance  Decrease limitations with standing, walking and ADL's  DC with HEP  Plan  Plan details: All planned modality interventions and planned therapy interventions are provided PRN    Patient would benefit from: PT eval and skilled physical therapy  Planned modality interventions: ultrasound, unattended electrical stimulation and TENS  Planned therapy interventions: joint mobilization, manual therapy, balance, balance/weight bearing training, neuromuscular re-education, patient education, postural training, self care, strengthening, stretching, therapeutic activities, therapeutic exercise, therapeutic training, transfer training, coordination, flexibility, gait training, graded exercise and home exercise program  Frequency: 3x week  Duration in weeks: 12  Treatment plan discussed with: patient      Subjective Evaluation    Pain  Quality: discomfort, knife-like, pulling, squeezing, sharp, tight and throbbing  Relieving factors: support, rest, relaxation and change in position  Aggravating factors: lifting, running, stair climbing, walking and standing  Progression: improved    Treatments  Previous treatment: physical therapy  Current treatment: physical therapy  Patient Goals  Patient goals for therapy: decreased pain, improved balance, increased motion, return to work, return to [employer] activities, independence with ADLs/IADLs and increased strength      Date of onset:  9/5/2020    Date of Surgery:  None    History of Present Episode: 10/29/2020  Richard Kowalski states his low back and neck region have flared up  He reports no recent history or trauma or injury  He did fall off a ladder about 6 months ago and his back has been flared up since that incident  Past Medical History:    10/29/2020  Richard Kowalski reports left shoulder surgery  B TKR  Neck fusion C3-5  Muscle stimulator in his back  Previous Level of Functional Ability:  10/29/2020  Richard Kowalski states his back and neck had been doing OK  Inspection / Palpation:  Lumbar:  10/29/2020  Mesomorphic / Endomorphic body type  No signs of infection  No signs of wounds  No signs of drainage  No signs of ecchymotic regions  No signs of erythremic regions  Mild to moderate signs of muscle spasm  Mild to moderate signs of muscle guarding  Mild to moderate signs of tenderness reported to palpation  Mild signs of atrophy noted  No signs of swelling  No signs of a surgery site  Current conditions appears consistent with recent acute   episode      Chief Complaints:  10/29/2020  Bjorn reports moderate to severe difficulty with standing  Jonathan Means reports moderate to severe difficulty with walking  Jonathan Means reports moderate difficulty with movement / use of his lumbar region  Jonathan Means reports moderate difficulty with use of stairs  Jonathan Means reports severe difficulty with running  Jonathan Means reports severe difficulty with jumping  Jonathan Means reports moderate difficulty with use of inclines  Jonathan Means reports moderate to severe difficulty with sleeping  Jonathan Means reports mild to moderate difficulty with his strength and endurance  Jonathan Means reports mild to moderate limitations with his range of motion  Jonathan Means reports mild to moderate difficulty lying on his lumbar region  Jonathan Means reports mild to moderate difficulty twisting / turning his lumbar region      LUMBAR PAIN     Resting Palpation Bending Extending Walking Lifting   10/29/2020 3 3-5 5-7 3-5 5-10 3-7   12/8/2020 2 2-4 3-5 2-4 3-8 2-6   1/12/2021 1-2 1-3 2-4 1-3 2-7 2-5   2/16/2021 0-2 0-3 1-3 0-2 2-6 1-4     LUMBAR PAIN     Pulling Pushing Sleeping Twisting Standing   10/29/2020 3-8 3-8 5-10 5-10 5-10   12/8/2020 2-7 2-7 4-8 4-8 4-8   1/12/2021 2-6 2-6 3-7 3-7 3-7   2/16/2021 2-5 2-5 2-6 2-6 2-6     LUMBAR AROM Flexion Extension Rotation Right   10/29/2020 45° 6° 32°   12/8/2020 52° 8° 41°   1/12/2021 58° 9° 43°   2/16/2021 63° 10° 45°     LUMBAR AROM Rotation Left Side Bending Right Side Bending Left   10/29/2020 31° 24° 25°   12/8/2020 35° 30° 31°   1/12/2021 39° 34° 34°   2/16/2021 42° 37° 37°     LUMBAR MMT / PAIN Flexion Extension Rotation Right   10/29/2020 0/10  19 lbs 0/10  20 lbs 0/10  18 lbs   12/8/2020 0/10  24 lbs 0/10  24 lbs 0/10  23 lbs   1/12/2021 0/10  28 lbs 0/10  28 lbs 0/10  26 lbs   2/16/2021 0/10  32 lbs 0/10  33 lbs 0/10  30 lbs     LUMBAR MMT / PAIN Rotation Left Side Bend Right Side Bend Left   10/29/2020 0/10  19 lbs 0/10  20 lbs 0/10  21 lbs   12/8/2020 0/10  23 lbs 0/10  24 lbs 0/10  25 lbs   1/12/2021 0/10  26 lbs 0/10  28 lbs 0/10  28 lbs   2/16/2021 0/10  30 lbs 0/10  32 lbs 0/10  33 lbs     LE MMT / PAIN Dorsiflexion Plantarflexion Knee flexion Knee extension   10/29/2020 Rt 5/10  24 lbs 5/10  28 lbs 0/10  31 lbs 0/10  32 lbs   10/29/2020 Lt 0/10  31 lbs 0/10  32 lbs 0/10  35 lbs 0/10  36 lbs     LE MMT / PAIN Hip Flexion Hip Abduction Hip Adduction   10/29/2020  Rt 5/10  10 lbs 0/10  15 lbs 0/10  14 lbs   10/29/2020  Lt 4/10  18 lbs 0/10  21 lbs 0/10  22 lbs     LUMBAR SCREEN Referred Pain Sacroiliac Joint test Squish Test Straight Leg  Raise   10/29/2020 Rt Negative Negative Negative Negative   10/29/2020 Lt Negative Negative Negative Negative     LUMBAR SCREEN Valsalva Gapping Bowstring sign Hip Bursitis   10/29/2020 Rt Negative Negative Negative Negative   10/29/2020 Lt Negative Negative Negative Negative     Precautions:  Lumbar and Neck Issues

## 2021-02-17 NOTE — PROGRESS NOTES
PT Re-Evaluation   Today's date: 2021    Patient name: Samuel Bey  : 1950  MRN: [id number]  Referring provider: Jacky Avina MD  Dx:   Encounter Diagnosis     ICD-10-CM    1  Lumbar radiculopathy  M54 16    2  Difficulty walking  R26 2    3  Neck pain  M54 2    4  Aftercare following surgery  Z48 89    5  Acute pain of left shoulder  M25 512      Assessment  Assessment details: Patient is slowly feeling better with his low back pain issues  He is standing longer and walking further  Impairments: abnormal gait, abnormal or restricted ROM, abnormal movement, activity intolerance, impaired balance, impaired physical strength, pain with function, safety issue and weight-bearing intolerance  Understanding of Dx/Px/POC: excellent   Prognosis: good    Goals  STG  2-4 weeks:   Increase lumbar spine AROM by 2-4 degrees  Increase lower extremity strength by 2-4 lbs in all weak areas  Improve sitting posture and body mechanics  Increase standing/ADL tolerance minutes  Decrease pain by 25-50% with standing, walking, and stairs  Initiate HEP  LTG  6-8 weeks:   Demonstrate normal lumbar rotation  Decrease pain to 1-2/10 with activity  Increase lower extremity strength by 10-20 lbs in all weak areas  Improve spinal stability  Improve gait pattern and balance  Decrease limitations with standing, walking and ADL's  DC with HEP  Plan  Plan details: All planned modality interventions and planned therapy interventions are provided PRN    Patient would benefit from: PT eval and skilled physical therapy  Planned modality interventions: TENS, ultrasound and unattended electrical stimulation  Planned therapy interventions: manual therapy, joint mobilization, balance, balance/weight bearing training, neuromuscular re-education, patient education, postural training, self care, coordination, flexibility, strengthening, stretching, therapeutic activities, therapeutic exercise, therapeutic training, transfer training, home exercise program, graded exercise and gait training  Frequency: 3x week  Duration in weeks: 12  Treatment plan discussed with: patient      Subjective Evaluation    Pain  Quality: discomfort, knife-like, pulling, squeezing, sharp, tight and throbbing  Relieving factors: support, rest, relaxation and change in position  Aggravating factors: lifting, running, stair climbing, walking and standing  Progression: improved    Treatments  Previous treatment: physical therapy  Current treatment: physical therapy  Patient Goals  Patient goals for therapy: decreased pain, improved balance, increased motion, return to work, return to [employer] activities, independence with ADLs/IADLs and increased strength      Date of onset:  9/5/2020    Date of Surgery:  None    History of Present Episode: 10/29/2020  Grisel Partida states his low back and neck region have flared up  He reports no recent history or trauma or injury  He did fall off a ladder about 6 months ago and his back has been flared up since that incident  Past Medical History:    10/29/2020  Grisel Partida reports left shoulder surgery  B TKR  Neck fusion C3-5  Muscle stimulator in his back  Previous Level of Functional Ability:  10/29/2020  Grisel Partida states his back and neck had been doing OK  Inspection / Palpation:  Lumbar:  10/29/2020  Mesomorphic / Endomorphic body type  No signs of infection  No signs of wounds  No signs of drainage  No signs of ecchymotic regions  No signs of erythremic regions  Mild to moderate signs of muscle spasm  Mild to moderate signs of muscle guarding  Mild to moderate signs of tenderness reported to palpation  Mild signs of atrophy noted  No signs of swelling  No signs of a surgery site  Current conditions appears consistent with recent acute   episode  Chief Complaints:  10/29/2020  Bjorn reports moderate to severe difficulty with standing   Grisel Partida reports moderate to severe difficulty with walking  Dayami Richardson reports moderate difficulty with movement / use of his lumbar region  Dayami Richardson reports moderate difficulty with use of stairs  Dayami Richardson reports severe difficulty with running  Dayami Richardson reports severe difficulty with jumping  Dayami Richardson reports moderate difficulty with use of inclines  Dayami Richardson reports moderate to severe difficulty with sleeping  Dayami Richardson reports mild to moderate difficulty with his strength and endurance  Dayami Richardson reports mild to moderate limitations with his range of motion  Dayami Richardson reports mild to moderate difficulty lying on his lumbar region  Dayami Richardson reports mild to moderate difficulty twisting / turning his lumbar region      LUMBAR PAIN     Resting Palpation Bending Extending Walking Lifting   10/29/2020 3 3-5 5-7 3-5 5-10 3-7   12/8/2020 2 2-4 3-5 2-4 3-8 2-6   1/12/2021 1-2 1-3 2-4 1-3 2-7 2-5     LUMBAR PAIN     Pulling Pushing Sleeping Twisting Standing   10/29/2020 3-8 3-8 5-10 5-10 5-10   12/8/2020 2-7 2-7 4-8 4-8 4-8   1/12/2021 2-6 2-6 3-7 3-7 3-7     LUMBAR AROM Flexion Extension Rotation Right   10/29/2020 45° 6° 32°   12/8/2020 52° 8° 41°   1/12/2021 58° 9° 43°     LUMBAR AROM Rotation Left Side Bending Right Side Bending Left   10/29/2020 31° 24° 25°   12/8/2020 35° 30° 31°   1/12/2021 39° 34° 34°     LUMBAR MMT / PAIN Flexion Extension Rotation Right   10/29/2020 0/10  19 lbs 0/10  20 lbs 0/10  18 lbs   12/8/2020 0/10  24 lbs 0/10  24 lbs 0/10  23 lbs   1/12/2021 0/10  28 lbs 0/10  28 lbs 0/10  26 lbs     LUMBAR MMT / PAIN Rotation Left Side Bend Right Side Bend Left   10/29/2020 0/10  19 lbs 0/10  20 lbs 0/10  21 lbs   12/8/2020 0/10  23 lbs 0/10  24 lbs 0/10  25 lbs   1/12/2021 0/10  26 lbs 0/10  28 lbs 0/10  28 lbs     LE MMT / PAIN Dorsiflexion Plantarflexion Knee flexion Knee extension   10/29/2020 Rt 5/10  24 lbs 5/10  28 lbs 0/10  31 lbs 0/10  32 lbs   10/29/2020 Lt 0/10  31 lbs 0/10  32 lbs 0/10  35 lbs 0/10  36 lbs     LE MMT / PAIN Hip Flexion Hip Abduction Hip Adduction   10/29/2020  Rt 5/10  10 lbs 0/10  15 lbs 0/10  14 lbs   10/29/2020  Lt 4/10  18 lbs 0/10  21 lbs 0/10  22 lbs     LUMBAR SCREEN Referred Pain Sacroiliac Joint test Squish Test Straight Leg  Raise   10/29/2020 Rt Negative Negative Negative Negative   10/29/2020 Lt Negative Negative Negative Negative     LUMBAR SCREEN Valsalva Gapping Bowstring sign Hip Bursitis   10/29/2020 Rt Negative Negative Negative Negative   10/29/2020 Lt Negative Negative Negative Negative     Precautions:  Lumbar and Neck Issues

## 2021-02-17 NOTE — PROGRESS NOTES
PT Re-Evaluation   Today's date: 2021    Patient name: Laura Gao  : 1950  MRN: [id number]  Referring provider: Fortino Lainez MD  Dx:   Encounter Diagnosis     ICD-10-CM    1  Lumbar radiculopathy  M54 16    2  Difficulty walking  R26 2    3  Neck pain  M54 2    4  Aftercare following surgery  Z48 89    5  Acute pain of left shoulder  M25 512      Assessment  Assessment details: Patient states he is getting stronger and stronger  He can laura longer and walk further  He is sleeping better  Impairments: abnormal or restricted ROM, abnormal movement, activity intolerance, impaired balance, impaired physical strength, pain with function, safety issue and weight-bearing intolerance  Understanding of Dx/Px/POC: excellent   Prognosis: good    Goals  STG  2-4 weeks:   Increase lumbar spine AROM by 2-4 degrees  Increase lower extremity strength by 2-4 lbs in all weak areas  Improve sitting posture and body mechanics  Increase standing/ADL tolerance minutes  Decrease pain by 25-50% with standing, walking, and stairs  Initiate HEP  LTG  6-8 weeks:   Demonstrate normal lumbar rotation  Decrease pain to 1-2/10 with activity  Increase lower extremity strength by 10-20 lbs in all weak areas  Improve spinal stability  Improve gait pattern and balance  Decrease limitations with standing, walking and ADL's  DC with HEP  Plan  Plan details: All planned modality interventions and planned therapy interventions are provided PRN    Patient would benefit from: PT eval and skilled physical therapy  Planned modality interventions: ultrasound, unattended electrical stimulation and TENS  Planned therapy interventions: joint mobilization, manual therapy, balance, balance/weight bearing training, neuromuscular re-education, patient education, postural training, self care, strengthening, stretching, therapeutic activities, therapeutic exercise, therapeutic training, transfer training, coordination, flexibility, gait training, graded exercise and home exercise program  Frequency: 3x week  Duration in weeks: 12  Treatment plan discussed with: patient      Subjective Evaluation    Pain  Quality: discomfort, knife-like, pulling, squeezing, sharp, tight and throbbing  Relieving factors: support, rest, relaxation and change in position  Aggravating factors: lifting, running, stair climbing, walking and standing  Progression: improved    Treatments  Previous treatment: physical therapy  Current treatment: physical therapy  Patient Goals  Patient goals for therapy: decreased pain, improved balance, increased motion, return to work, return to [employer] activities, independence with ADLs/IADLs and increased strength      Date of onset:  9/5/2020    Date of Surgery:  None    History of Present Episode: 10/29/2020  Sharron Glass states his low back and neck region have flared up  He reports no recent history or trauma or injury  He did fall off a ladder about 6 months ago and his back has been flared up since that incident  Past Medical History:    10/29/2020  Sharron Glass reports left shoulder surgery  B TKR  Neck fusion C3-5  Muscle stimulator in his back  Previous Level of Functional Ability:  10/29/2020  Sharron Glass states his back and neck had been doing OK  Inspection / Palpation:  Lumbar:  10/29/2020  Mesomorphic / Endomorphic body type  No signs of infection  No signs of wounds  No signs of drainage  No signs of ecchymotic regions  No signs of erythremic regions  Mild to moderate signs of muscle spasm  Mild to moderate signs of muscle guarding  Mild to moderate signs of tenderness reported to palpation  Mild signs of atrophy noted  No signs of swelling  No signs of a surgery site  Current conditions appears consistent with recent acute   episode  Chief Complaints:  10/29/2020  Bjorn reports moderate to severe difficulty with standing   Sharron Glass reports moderate to severe difficulty with walking  Ofilia Aas reports moderate difficulty with movement / use of his lumbar region  Ofilia Aas reports moderate difficulty with use of stairs  Ofilia Aas reports severe difficulty with running  Ofilia Aas reports severe difficulty with jumping  Ofilia Aas reports moderate difficulty with use of inclines  Ofilia Aas reports moderate to severe difficulty with sleeping  Ofilia Aas reports mild to moderate difficulty with his strength and endurance  Ofilia Aas reports mild to moderate limitations with his range of motion  Ofilia Aas reports mild to moderate difficulty lying on his lumbar region  Ofilia Aas reports mild to moderate difficulty twisting / turning his lumbar region      LUMBAR PAIN     Resting Palpation Bending Extending Walking Lifting   10/29/2020 3 3-5 5-7 3-5 5-10 3-7   12/8/2020 2 2-4 3-5 2-4 3-8 2-6   1/12/2021 1-2 1-3 2-4 1-3 2-7 2-5   2/16/2021 0-2 0-3 1-3 0-2 2-6 1-4     LUMBAR PAIN     Pulling Pushing Sleeping Twisting Standing   10/29/2020 3-8 3-8 5-10 5-10 5-10   12/8/2020 2-7 2-7 4-8 4-8 4-8   1/12/2021 2-6 2-6 3-7 3-7 3-7   2/16/2021 2-5 2-5 2-6 2-6 2-6     LUMBAR AROM Flexion Extension Rotation Right   10/29/2020 45° 6° 32°   12/8/2020 52° 8° 41°   1/12/2021 58° 9° 43°   2/16/2021 63° 10° 45°     LUMBAR AROM Rotation Left Side Bending Right Side Bending Left   10/29/2020 31° 24° 25°   12/8/2020 35° 30° 31°   1/12/2021 39° 34° 34°   2/16/2021 42° 37° 37°     LUMBAR MMT / PAIN Flexion Extension Rotation Right   10/29/2020 0/10  19 lbs 0/10  20 lbs 0/10  18 lbs   12/8/2020 0/10  24 lbs 0/10  24 lbs 0/10  23 lbs   1/12/2021 0/10  28 lbs 0/10  28 lbs 0/10  26 lbs   2/16/2021 0/10  32 lbs 0/10  33 lbs 0/10  30 lbs     LUMBAR MMT / PAIN Rotation Left Side Bend Right Side Bend Left   10/29/2020 0/10  19 lbs 0/10  20 lbs 0/10  21 lbs   12/8/2020 0/10  23 lbs 0/10  24 lbs 0/10  25 lbs   1/12/2021 0/10  26 lbs 0/10  28 lbs 0/10  28 lbs   2/16/2021 0/10  30 lbs 0/10  32 lbs 0/10  33 lbs     LE MMT / PAIN Dorsiflexion Plantarflexion Knee flexion Knee extension   10/29/2020 Rt 5/10  24 lbs 5/10  28 lbs 0/10  31 lbs 0/10  32 lbs   10/29/2020 Lt 0/10  31 lbs 0/10  32 lbs 0/10  35 lbs 0/10  36 lbs     LE MMT / PAIN Hip Flexion Hip Abduction Hip Adduction   10/29/2020  Rt 5/10  10 lbs 0/10  15 lbs 0/10  14 lbs   10/29/2020  Lt 4/10  18 lbs 0/10  21 lbs 0/10  22 lbs     LUMBAR SCREEN Referred Pain Sacroiliac Joint test Squish Test Straight Leg  Raise   10/29/2020 Rt Negative Negative Negative Negative   10/29/2020 Lt Negative Negative Negative Negative     LUMBAR SCREEN Valsalva Gapping Bowstring sign Hip Bursitis   10/29/2020 Rt Negative Negative Negative Negative   10/29/2020 Lt Negative Negative Negative Negative     Precautions:  Lumbar and Neck Issues

## 2021-02-18 ENCOUNTER — APPOINTMENT (OUTPATIENT)
Dept: PHYSICAL THERAPY | Facility: CLINIC | Age: 71
End: 2021-02-18
Payer: MEDICARE

## 2021-02-22 ENCOUNTER — TRANSCRIBE ORDERS (OUTPATIENT)
Dept: PHYSICAL THERAPY | Facility: CLINIC | Age: 71
End: 2021-02-22

## 2021-02-22 DIAGNOSIS — M54.2 NECK PAIN: ICD-10-CM

## 2021-02-22 DIAGNOSIS — R26.2 DIFFICULTY WALKING: ICD-10-CM

## 2021-02-22 DIAGNOSIS — M54.16 LUMBAR RADICULOPATHY: Primary | ICD-10-CM

## 2021-02-23 ENCOUNTER — OFFICE VISIT (OUTPATIENT)
Dept: PHYSICAL THERAPY | Facility: CLINIC | Age: 71
End: 2021-02-23
Payer: MEDICARE

## 2021-02-23 DIAGNOSIS — Z48.89 AFTERCARE FOLLOWING SURGERY: ICD-10-CM

## 2021-02-23 DIAGNOSIS — M54.16 LUMBAR RADICULOPATHY: Primary | ICD-10-CM

## 2021-02-23 DIAGNOSIS — M25.512 ACUTE PAIN OF LEFT SHOULDER: ICD-10-CM

## 2021-02-23 DIAGNOSIS — R26.2 DIFFICULTY WALKING: ICD-10-CM

## 2021-02-23 DIAGNOSIS — M54.2 NECK PAIN: ICD-10-CM

## 2021-02-23 PROCEDURE — 97140 MANUAL THERAPY 1/> REGIONS: CPT | Performed by: PHYSICAL THERAPIST

## 2021-02-23 PROCEDURE — 97112 NEUROMUSCULAR REEDUCATION: CPT | Performed by: PHYSICAL THERAPIST

## 2021-02-23 PROCEDURE — 97110 THERAPEUTIC EXERCISES: CPT | Performed by: PHYSICAL THERAPIST

## 2021-02-23 NOTE — PROGRESS NOTES
Today's date: 2021  Patient name: Mariano Ross  : 1950  MRN: [id number]  Referring provider: Gabbie Stallings MD  Dx:   Encounter Diagnosis     ICD-10-CM    1  Lumbar radiculopathy  M54 16    2  Difficulty walking  R26 2    3  Neck pain  M54 2    4  Aftercare following surgery  Z48 89    5  Acute pain of left shoulder  M25 512      Subjective:  Jonathan Malin states his back is slowly getting better  He is so happy with his progress and knows he needs more  Objective: See treatment log below  Jonathan Malin continues to be advised to follow his home exercise program as tolerated  When Jonathan Malin is feeling good he follows his rehab exercises in the gym and on other days he follows his home exercise program at home as tolerated  In the future we plan on having Bjorn stay at home and follow his home exercise program for four to six weeks and than assess for either more Rehab treatments or discharge from Rehab care  Assessment: Tolerated treatment well  Patient exhibited good technique with therapeutic exercises and would benefit from continued PT  Bjorn's goals are to continue to improve with his rehab program and improve with functional mobility, speed, repetition and decreased c/o pain with his gym and home exercise program   Bjorn's final goal for his rehab is to be discharged from Rehab care after obtaining his full functional rehab potential     Plan: Continue per plan of care  Progress treatment as tolerated  Precautions:  Lumbar and Neck Issues    All treatments below will be provided with a focus on strengthening, flexibility, ROM, postural,   endurance and any possible swelling and pain which may be present without ignoring   neural issues involving balance, coordination and proprioception which is also important   and necessary to provide full functional mobility and quality care        Daily Treatment Log  Manual      MT, ROM 30' 20' 35' 25'    HEP Exercise Log 2/9 2/11 2/16 2/23    Balance Board        Chair Squats        BOSU-Walk        Foam Pad SLR,Hip/KneeFl,Step Ups        Foam Beam        P-Bar-GT-Forward, Backward,Side-Even & Dips        Monster Steps        Fitter-Slalom        T/G-Squats,PF        W/P-PNF,IR,ER,PU,PS:Top,Mid,Bot        NuStep 10'L5 10' L5  10' L5    Maria R-BP,Lats,PD,Row        Maria R-TrunkFlex/Ext 65# 30x 70# 30x 75# 30x 75#-30x    Klbubsd-IQ-Gi 2x2' 2x2' 2x2' 2x2'    NK Table        TM        UBC        Bike        Mat-GreenBallDKTC,HipRot,Bridges        ME, PE 15' 15' 15' 15'            Modalities 2/9 2/11 2/16 2/23    MH / PE / ES 20' 25' 20' 20'

## 2021-02-25 ENCOUNTER — OFFICE VISIT (OUTPATIENT)
Dept: PHYSICAL THERAPY | Facility: CLINIC | Age: 71
End: 2021-02-25
Payer: MEDICARE

## 2021-02-25 DIAGNOSIS — M25.512 ACUTE PAIN OF LEFT SHOULDER: ICD-10-CM

## 2021-02-25 DIAGNOSIS — Z48.89 AFTERCARE FOLLOWING SURGERY: ICD-10-CM

## 2021-02-25 DIAGNOSIS — M54.2 NECK PAIN: ICD-10-CM

## 2021-02-25 DIAGNOSIS — M54.16 LUMBAR RADICULOPATHY: Primary | ICD-10-CM

## 2021-02-25 DIAGNOSIS — R26.2 DIFFICULTY WALKING: ICD-10-CM

## 2021-02-25 PROCEDURE — 97140 MANUAL THERAPY 1/> REGIONS: CPT

## 2021-02-25 PROCEDURE — 97014 ELECTRIC STIMULATION THERAPY: CPT

## 2021-02-25 PROCEDURE — 97112 NEUROMUSCULAR REEDUCATION: CPT

## 2021-02-25 NOTE — PROGRESS NOTES
Today's date: 2021  Patient name: Michelle Gomes  : 1950  MRN: [id number]  Referring provider: Emma Catalan MD  Dx:   Encounter Diagnosis     ICD-10-CM    1  Lumbar radiculopathy  M54 16    2  Difficulty walking  R26 2    3  Neck pain  M54 2    4  Aftercare following surgery  Z48 89    5  Acute pain of left shoulder  M25 512      Subjective:  No new c/o pain today  Objective: See treatment log below  Dayami Richardson continues to be advised to follow his home exercise program as tolerated  When Dayami Richardson is feeling good he follows his rehab exercises in the gym and on other days he follows his home exercise program at home as tolerated  In the future we plan on having Bjorn stay at home and follow his home exercise program for four to six weeks and than assess for either more Rehab treatments or discharge from Rehab care  Assessment: Tolerated treatment well  Patient exhibited good technique with therapeutic exercises and would benefit from continued PT  Bjorn's goals are to continue to improve with his rehab program and improve with functional mobility, speed, repetition and decreased c/o pain with his gym and home exercise program   Bjorn's final goal for his rehab is to be discharged from Rehab care after obtaining his full functional rehab potential     Plan: Continue per plan of care  Progress treatment as tolerated  Precautions:  Lumbar and Neck Issues    All treatments below will be provided with a focus on strengthening, flexibility, ROM, postural,   endurance and any possible swelling and pain which may be present without ignoring   neural issues involving balance, coordination and proprioception which is also important   and necessary to provide full functional mobility and quality care        Daily Treatment Log  Manual     MT, ROM 30' 20' 35' 25' 35'   HEP        Exercise Log    Balance Board        Chair Squats BOSU-Walk        Foam Pad SLR,Hip/KneeFl,Step Ups        Foam Beam        P-Bar-GT-Forward, Backward,Side-Even & Dips        Monster Steps        Fitter-Slalom        T/G-Squats,PF        W/P-PNF,IR,ER,PU,PS:Top,Mid,Bot        NuStep 10'L5 10' L5  10' L5    Maria R-BP,Lats,PD,Row        Maria R-TrunkFlex/Ext 65# 30x 70# 30x 75# 30x 75#-30x 75# 30x   Fqsnyxk-CT-Da 2x2' 2x2' 2x2' 2x2' 2x2'   NK Table        TM        UBC        Bike        Mat-GreenBallDKTC,HipRot,Bridges        ME, PE 15' 15' 15' 15' 15'           Modalities 2/9 2/11 2/16 2/23 2/25   MH / PE / ES 20' 25' 20' 20' 20'

## 2021-03-02 ENCOUNTER — OFFICE VISIT (OUTPATIENT)
Dept: PHYSICAL THERAPY | Facility: CLINIC | Age: 71
End: 2021-03-02
Payer: MEDICARE

## 2021-03-02 DIAGNOSIS — R26.2 DIFFICULTY WALKING: ICD-10-CM

## 2021-03-02 DIAGNOSIS — Z48.89 AFTERCARE FOLLOWING SURGERY: ICD-10-CM

## 2021-03-02 DIAGNOSIS — M25.512 ACUTE PAIN OF LEFT SHOULDER: ICD-10-CM

## 2021-03-02 DIAGNOSIS — M54.16 LUMBAR RADICULOPATHY: Primary | ICD-10-CM

## 2021-03-02 DIAGNOSIS — M54.2 NECK PAIN: ICD-10-CM

## 2021-03-02 PROCEDURE — 97140 MANUAL THERAPY 1/> REGIONS: CPT

## 2021-03-02 PROCEDURE — 97014 ELECTRIC STIMULATION THERAPY: CPT

## 2021-03-02 NOTE — PROGRESS NOTES
Today's date: 3/2/2021  Patient name: Mariano Ross  : 1950  MRN: [id number]  Referring provider: Gabbie Stallings MD  Dx:   Encounter Diagnosis     ICD-10-CM    1  Lumbar radiculopathy  M54 16    2  Difficulty walking  R26 2    3  Neck pain  M54 2    4  Aftercare following surgery  Z48 89    5  Acute pain of left shoulder  M25 512      Subjective:  No new c/o pain today  Objective: See treatment log below  Jonathan Malin continues to be advised to follow his home exercise program as tolerated  When Jonathan Malin is feeling good he follows his rehab exercises in the gym and on other days he follows his home exercise program at home as tolerated  In the future we plan on having Bjorn stay at home and follow his home exercise program for four to six weeks and than assess for either more Rehab treatments or discharge from Rehab care  Assessment: Tolerated treatment well  Patient exhibited good technique with therapeutic exercises and would benefit from continued PT  Bjorn's goals are to continue to improve with his rehab program and improve with functional mobility, speed, repetition and decreased c/o pain with his gym and home exercise program   Bjorn's final goal for his rehab is to be discharged from Rehab care after obtaining his full functional rehab potential     Plan: Continue per plan of care  Progress treatment as tolerated  Precautions:  Lumbar and Neck Issues    All treatments below will be provided with a focus on strengthening, flexibility, ROM, postural,   endurance and any possible swelling and pain which may be present without ignoring   neural issues involving balance, coordination and proprioception which is also important   and necessary to provide full functional mobility and quality care        Daily Treatment Log  Manual  3/2    2/25   MT, ROM 30'    35'   HEP        Exercise Log 3/2    2/25   Balance Board        Chair Squats        BOSU-Walk        Foam Pad SLR,Hip/KneeFl,Step Ups        Foam Beam        P-Bar-GT-Forward, Backward,Side-Even & Dips        Monster Steps        Fitter-Slalom        T/G-Squats,PF        W/P-PNF,IR,ER,PU,PS:Top,Mid,Bot        NuStep        Maria R-BP,Lats,PD,Row        Maria R-TrunkFlex/Ext 65# 30x    75# 30x   Zmdvvox-YL-Gk 2x2' 2x1'    2x2'   NK Table        TM        UBC        Bike        Mat-GreenBallDKTC,HipRot,Bridges        ME, PE 13'    15'           Modalities 3/2    2/25   MH / PE / ES 22'    20'

## 2021-03-04 ENCOUNTER — OFFICE VISIT (OUTPATIENT)
Dept: PHYSICAL THERAPY | Facility: CLINIC | Age: 71
End: 2021-03-04
Payer: MEDICARE

## 2021-03-04 DIAGNOSIS — Z48.89 AFTERCARE FOLLOWING SURGERY: ICD-10-CM

## 2021-03-04 DIAGNOSIS — M54.16 LUMBAR RADICULOPATHY: Primary | ICD-10-CM

## 2021-03-04 DIAGNOSIS — M25.512 ACUTE PAIN OF LEFT SHOULDER: ICD-10-CM

## 2021-03-04 DIAGNOSIS — R26.2 DIFFICULTY WALKING: ICD-10-CM

## 2021-03-04 DIAGNOSIS — M54.2 NECK PAIN: ICD-10-CM

## 2021-03-04 PROCEDURE — 97112 NEUROMUSCULAR REEDUCATION: CPT

## 2021-03-04 PROCEDURE — 97014 ELECTRIC STIMULATION THERAPY: CPT

## 2021-03-04 PROCEDURE — 97140 MANUAL THERAPY 1/> REGIONS: CPT

## 2021-03-04 NOTE — PROGRESS NOTES
Today's date: 3/4/2021  Patient name: Michelle Gomes  : 1950  MRN: [id number]  Referring provider: Emma Catalan MD  Dx:   Encounter Diagnosis     ICD-10-CM    1  Lumbar radiculopathy  M54 16    2  Difficulty walking  R26 2    3  Neck pain  M54 2    4  Aftercare following surgery  Z48 89    5  Acute pain of left shoulder  M25 512      Subjective:  No new c/o pain today  Objective: See treatment log below  Dayami Richardson continues to be advised to follow his home exercise program as tolerated  When Dayami Richardson is feeling good he follows his rehab exercises in the gym and on other days he follows his home exercise program at home as tolerated  In the future we plan on having Bjorn stay at home and follow his home exercise program for four to six weeks and than assess for either more Rehab treatments or discharge from Rehab care  Assessment: Tolerated treatment well  Patient exhibited good technique with therapeutic exercises and would benefit from continued PT  Bjorn's goals are to continue to improve with his rehab program and improve with functional mobility, speed, repetition and decreased c/o pain with his gym and home exercise program   Bjorn's final goal for his rehab is to be discharged from Rehab care after obtaining his full functional rehab potential     Plan: Continue per plan of care  Progress treatment as tolerated  Precautions:  Lumbar and Neck Issues    All treatments below will be provided with a focus on strengthening, flexibility, ROM, postural,   endurance and any possible swelling and pain which may be present without ignoring   neural issues involving balance, coordination and proprioception which is also important   and necessary to provide full functional mobility and quality care        Daily Treatment Log  Manual  3/2 3/4      MT, ROM 30' 30'      HEP        Exercise Log 3/2 3/4      Balance Board        Chair Squats        BOSU-Walk        Foam Pad SLR,Hip/KneeFl,Step Ups        Foam Beam        P-Bar-GT-Forward, Backward,Side-Even & Dips        Monster Steps        Fitter-Slalom        T/G-Squats,PF        W/P-PNF,IR,ER,PU,PS:Top,Mid,Bot        NuStep        Maria R-BP,Lats,PD,Row        Maria R-TrunkFlex/Ext 65# 30x 65# 30x      Cpfsblj-YY-Hk 2x2' 2x1' 2x2' 2x1'      NK Table        TM        UBC        Bike        Mat-GreenBallDKTC,HipRot,Bridges        ME, PE 15' 15'              Modalities 3/2 3/4      MH / PE / ES 25' 25'

## 2021-03-09 ENCOUNTER — OFFICE VISIT (OUTPATIENT)
Dept: PHYSICAL THERAPY | Facility: CLINIC | Age: 71
End: 2021-03-09
Payer: MEDICARE

## 2021-03-09 DIAGNOSIS — R26.2 DIFFICULTY WALKING: ICD-10-CM

## 2021-03-09 DIAGNOSIS — Z48.89 AFTERCARE FOLLOWING SURGERY: ICD-10-CM

## 2021-03-09 DIAGNOSIS — M25.512 ACUTE PAIN OF LEFT SHOULDER: ICD-10-CM

## 2021-03-09 DIAGNOSIS — M54.16 LUMBAR RADICULOPATHY: Primary | ICD-10-CM

## 2021-03-09 DIAGNOSIS — M54.2 NECK PAIN: ICD-10-CM

## 2021-03-09 PROCEDURE — 97140 MANUAL THERAPY 1/> REGIONS: CPT

## 2021-03-09 PROCEDURE — 97014 ELECTRIC STIMULATION THERAPY: CPT

## 2021-03-09 PROCEDURE — 97112 NEUROMUSCULAR REEDUCATION: CPT

## 2021-03-09 NOTE — PROGRESS NOTES
Today's date: 3/9/2021  Patient name: Lo Rosas  : 1950  MRN: [id number]  Referring provider: Wisam Walls MD  Dx:   Encounter Diagnosis     ICD-10-CM    1  Lumbar radiculopathy  M54 16    2  Difficulty walking  R26 2    3  Neck pain  M54 2    4  Aftercare following surgery  Z48 89    5  Acute pain of left shoulder  M25 512      Subjective:  No new c/o pain today  Objective: See treatment log below  Richard Kowalski continues to be advised to follow his home exercise program as tolerated  When Richard Kowalski is feeling good he follows his rehab exercises in the gym and on other days he follows his home exercise program at home as tolerated  In the future we plan on having Bjorn stay at home and follow his home exercise program for four to six weeks and than assess for either more Rehab treatments or discharge from Rehab care  Assessment: Tolerated treatment well  Patient exhibited good technique with therapeutic exercises and would benefit from continued PT  Bjorn's goals are to continue to improve with his rehab program and improve with functional mobility, speed, repetition and decreased c/o pain with his gym and home exercise program   Bjorn's final goal for his rehab is to be discharged from Rehab care after obtaining his full functional rehab potential     Plan: Continue per plan of care  Progress treatment as tolerated  Precautions:  Lumbar and Neck Issues    All treatments below will be provided with a focus on strengthening, flexibility, ROM, postural,   endurance and any possible swelling and pain which may be present without ignoring   neural issues involving balance, coordination and proprioception which is also important   and necessary to provide full functional mobility and quality care        Daily Treatment Log  Manual  3/2 3/4 3/9     MT, ROM 30' 30' 30'     HEP        Exercise Log 3/2 3/4 3/9     Balance Board        Chair Squats        BOSU-Walk        Foam Pad SLR,Hip/KneeFl,Step Ups        Foam Beam        P-Bar-GT-Forward, Backward,Side-Even & Dips        Monster Steps        Fitter-Slalom        T/G-Squats,PF        W/P-PNF,IR,ER,PU,PS:Top,Mid,Bot        NuStep        Maria R-BP,Lats,PD,Row        Maria R-TrunkFlex/Ext 65# 30x 65# 30x 65# 30x     Xanyywf-ND-Jj 2x2' 2x1' 2x2' 2x1' 2x2' 2x1'     NK Table        TM        UBC        Bike        Mat-GreenBallDKTC,HipRot,Bridges        ME, PE 15' 15' 15'             Modalities 3/2 3/4 3/9     MH / PE / ES 22' 25' 20'

## 2021-03-11 ENCOUNTER — OFFICE VISIT (OUTPATIENT)
Dept: PHYSICAL THERAPY | Facility: CLINIC | Age: 71
End: 2021-03-11
Payer: MEDICARE

## 2021-03-11 DIAGNOSIS — M25.512 ACUTE PAIN OF LEFT SHOULDER: ICD-10-CM

## 2021-03-11 DIAGNOSIS — R26.2 DIFFICULTY WALKING: ICD-10-CM

## 2021-03-11 DIAGNOSIS — M54.16 LUMBAR RADICULOPATHY: Primary | ICD-10-CM

## 2021-03-11 DIAGNOSIS — Z48.89 AFTERCARE FOLLOWING SURGERY: ICD-10-CM

## 2021-03-11 DIAGNOSIS — M54.2 NECK PAIN: ICD-10-CM

## 2021-03-11 PROCEDURE — 97112 NEUROMUSCULAR REEDUCATION: CPT

## 2021-03-11 PROCEDURE — 97140 MANUAL THERAPY 1/> REGIONS: CPT

## 2021-03-11 PROCEDURE — 97014 ELECTRIC STIMULATION THERAPY: CPT

## 2021-03-11 NOTE — PROGRESS NOTES
Today's date: 3/11/2021  Patient name: Oliver Barrios  : 1950  MRN: [id number]  Referring provider: Troy Gabriel MD  Dx:   Encounter Diagnosis     ICD-10-CM    1  Lumbar radiculopathy  M54 16    2  Difficulty walking  R26 2    3  Neck pain  M54 2    4  Aftercare following surgery  Z48 89    5  Acute pain of left shoulder  M25 512      Subjective:  No new c/o pain today  Objective: See treatment log below  Harvinder Lombardi continues to be advised to follow his home exercise program as tolerated  When Harvinder Lombardi is feeling good he follows his rehab exercises in the gym and on other days he follows his home exercise program at home as tolerated  In the future we plan on having Bjorn stay at home and follow his home exercise program for four to six weeks and than assess for either more Rehab treatments or discharge from Rehab care  Assessment: Tolerated treatment well  Patient exhibited good technique with therapeutic exercises and would benefit from continued PT  Bjorn's goals are to continue to improve with his rehab program and improve with functional mobility, speed, repetition and decreased c/o pain with his gym and home exercise program   Bjorn's final goal for his rehab is to be discharged from Rehab care after obtaining his full functional rehab potential     Plan: Continue per plan of care  Progress treatment as tolerated  Precautions:  Lumbar and Neck Issues    All treatments below will be provided with a focus on strengthening, flexibility, ROM, postural,   endurance and any possible swelling and pain which may be present without ignoring   neural issues involving balance, coordination and proprioception which is also important   and necessary to provide full functional mobility and quality care        Daily Treatment Log  Manual  3/2 3/4 3/9 3/11    MT, ROM 30' 30' 30' 20'    HEP        Exercise Log 3/2 3/4 3/9 3/11    Balance Board        Chair Squats        BOSU-Walk        Foam Pad SLR,Hip/KneeFl,Step Ups        Foam Beam        P-Bar-GT-Forward, Backward,Side-Even & Dips        Monster Steps        Fitter-Slalom        T/G-Squats,PF        W/P-PNF,IR,ER,PU,PS:Top,Mid,Bot        NuStep        Maria R-BP,Lats,PD,Row        Maria R-TrunkFlex/Ext 65# 30x 65# 30x 65# 30x 65# 30x    Mrdpryd-TC-Us 2x2' 2x1' 2x2' 2x1' 2x2' 2x1' 2x2' 2x1'    NK Table        TM        UBC        Bike        Mat-GreenBallDKTC,HipRot,Bridges        ME, PE 15' 15' 15' 15'            Modalities 3/2 3/4 3/9 3/11    MH / PE / ES 22' 25' 20' 20'

## 2021-03-15 ENCOUNTER — OFFICE VISIT (OUTPATIENT)
Dept: PHYSICAL THERAPY | Facility: CLINIC | Age: 71
End: 2021-03-15
Payer: MEDICARE

## 2021-03-15 DIAGNOSIS — M25.512 ACUTE PAIN OF LEFT SHOULDER: ICD-10-CM

## 2021-03-15 DIAGNOSIS — Z48.89 AFTERCARE FOLLOWING SURGERY: ICD-10-CM

## 2021-03-15 DIAGNOSIS — M54.2 NECK PAIN: ICD-10-CM

## 2021-03-15 DIAGNOSIS — M54.16 LUMBAR RADICULOPATHY: Primary | ICD-10-CM

## 2021-03-15 DIAGNOSIS — R26.2 DIFFICULTY WALKING: ICD-10-CM

## 2021-03-15 PROCEDURE — 97140 MANUAL THERAPY 1/> REGIONS: CPT

## 2021-03-15 PROCEDURE — 97112 NEUROMUSCULAR REEDUCATION: CPT

## 2021-03-15 PROCEDURE — 97014 ELECTRIC STIMULATION THERAPY: CPT

## 2021-03-15 NOTE — PROGRESS NOTES
Today's date: 3/15/2021  Patient name: Marcela Bryson  : 1950  MRN: [id number]  Referring provider: Parul Muñoz MD  Dx:   Encounter Diagnosis     ICD-10-CM    1  Lumbar radiculopathy  M54 16    2  Difficulty walking  R26 2    3  Neck pain  M54 2    4  Aftercare following surgery  Z48 89    5  Acute pain of left shoulder  M25 512      Subjective:  No new c/o pain today  Objective: See treatment log below  Jennifer Ovalles continues to be advised to follow his home exercise program as tolerated  When Jennifer Ovalles is feeling good he follows his rehab exercises in the gym and on other days he follows his home exercise program at home as tolerated  In the future we plan on having Bjorn stay at home and follow his home exercise program for four to six weeks and than assess for either more Rehab treatments or discharge from Rehab care  Assessment: Tolerated treatment well  Patient exhibited good technique with therapeutic exercises and would benefit from continued PT  Bjorn's goals are to continue to improve with his rehab program and improve with functional mobility, speed, repetition and decreased c/o pain with his gym and home exercise program   Bjorn's final goal for his rehab is to be discharged from Rehab care after obtaining his full functional rehab potential     Plan: Continue per plan of care  Progress treatment as tolerated  Precautions:  Lumbar and Neck Issues    All treatments below will be provided with a focus on strengthening, flexibility, ROM, postural,   endurance and any possible swelling and pain which may be present without ignoring   neural issues involving balance, coordination and proprioception which is also important   and necessary to provide full functional mobility and quality care        Daily Treatment Log  Manual  3/2 3/4 3/9 3/11 3/15   MT, ROM 30' 30' 30' 20' 20'   HEP        Exercise Log 3/2 3/4 3/9 3/11 3/15   Balance Board        Chair Squats        BOSU-Walk Foam Pad SLR,Hip/KneeFl,Step Ups        Foam Beam        P-Bar-GT-Forward, Backward,Side-Even & Dips        Monster Steps        Fitter-Slalom        T/G-Squats,PF        W/P-PNF,IR,ER,PU,PS:Top,Mid,Bot        NuStep        Maria R-BP,Lats,PD,Row        Maria R-TrunkFlex/Ext 65# 30x 65# 30x 65# 30x 65# 30x 65# 30x   Zzrrfub-BL-Ug 2x2' 2x1' 2x2' 2x1' 2x2' 2x1' 2x2' 2x1' 2x2' 2x1'   NK Table        TM        UBC        Bike        Mat-GreenBallDKTC,HipRot,Bridges        ME, PE 15' 15' 15' 15' 15'           Modalities 3/2 3/4 3/9 3/11 3/15   MH / PE / ES 25' 25' 20' 20' 25'

## 2021-03-16 ENCOUNTER — APPOINTMENT (OUTPATIENT)
Dept: PHYSICAL THERAPY | Facility: CLINIC | Age: 71
End: 2021-03-16
Payer: MEDICARE

## 2021-03-18 ENCOUNTER — OFFICE VISIT (OUTPATIENT)
Dept: PHYSICAL THERAPY | Facility: CLINIC | Age: 71
End: 2021-03-18
Payer: MEDICARE

## 2021-03-18 DIAGNOSIS — M25.512 ACUTE PAIN OF LEFT SHOULDER: ICD-10-CM

## 2021-03-18 DIAGNOSIS — R26.2 DIFFICULTY WALKING: ICD-10-CM

## 2021-03-18 DIAGNOSIS — M54.2 NECK PAIN: ICD-10-CM

## 2021-03-18 DIAGNOSIS — Z48.89 AFTERCARE FOLLOWING SURGERY: ICD-10-CM

## 2021-03-18 DIAGNOSIS — M54.16 LUMBAR RADICULOPATHY: Primary | ICD-10-CM

## 2021-03-18 PROCEDURE — 97112 NEUROMUSCULAR REEDUCATION: CPT

## 2021-03-18 PROCEDURE — 97014 ELECTRIC STIMULATION THERAPY: CPT

## 2021-03-18 PROCEDURE — 97164 PT RE-EVAL EST PLAN CARE: CPT | Performed by: PHYSICAL THERAPIST

## 2021-03-18 PROCEDURE — 97140 MANUAL THERAPY 1/> REGIONS: CPT

## 2021-03-18 NOTE — PROGRESS NOTES
Today's date: 3/18/2021  Patient name: Rob Acuna  : 1950  MRN: [id number]  Referring provider: Cleopatra Ortiz MD  Dx:   Encounter Diagnosis     ICD-10-CM    1  Lumbar radiculopathy  M54 16    2  Difficulty walking  R26 2    3  Neck pain  M54 2    4  Aftercare following surgery  Z48 89    5  Acute pain of left shoulder  M25 512      Subjective:  No new c/o pain today  Objective: See treatment log below  Елена Lentz continues to be advised to follow his home exercise program as tolerated  When Елена Lentz is feeling good he follows his rehab exercises in the gym and on other days he follows his home exercise program at home as tolerated  In the future we plan on having Bjorn stay at home and follow his home exercise program for four to six weeks and than assess for either more Rehab treatments or discharge from Rehab care  Assessment: Tolerated treatment well  Patient exhibited good technique with therapeutic exercises and would benefit from continued PT  Bjorn's goals are to continue to improve with his rehab program and improve with functional mobility, speed, repetition and decreased c/o pain with his gym and home exercise program   Bjron's final goal for his rehab is to be discharged from Rehab care after obtaining his full functional rehab potential     Plan: Continue per plan of care  Progress treatment as tolerated  Precautions:  Lumbar and Neck Issues    All treatments below will be provided with a focus on strengthening, flexibility, ROM, postural,   endurance and any possible swelling and pain which may be present without ignoring   neural issues involving balance, coordination and proprioception which is also important   and necessary to provide full functional mobility and quality care        Daily Treatment Log  Manual  3/18    3/15   MT, ROM 30'    20'   HEP        Exercise Log 3/18    3/15   Balance Board        Chair Squats        BOSU-Walk        Foam Pad SLR,Hip/KneeFl,Step Ups        Foam Beam        P-Bar-GT-Forward, Backward,Side-Even & Dips        Monster Steps        Fitter-Slalom        T/G-Squats,PF        W/P-PNF,IR,ER,PU,PS:Top,Mid,Bot        NuStep        Maria R-BP,Lats,PD,Row        Maria R-TrunkFlex/Ext 65# 30x    65# 30x   Glfpqeb-IC-Ix 2x2' 2x1'    2x2' 2x1'   NK Table        TM        UBC        Bike        Mat-GreenBallDKTC,HipRot,Bridges        ME, PE 15'    15'           Modalities 3/18    3/15   MH / PE / ES 20'    25'

## 2021-03-18 NOTE — LETTER
2021  PT Reevaluation [address] Mendoza MD  [facility] [address]    Patient: Oliver Barrios  YOB: 1950   Date of Visit: 3/18/2021     Encounter Diagnosis     ICD-10-CM    1  Lumbar radiculopathy  M54 16    2  Difficulty walking  R26 2    3  Neck pain  M54 2    4  Aftercare following surgery  Z48 89    5  Acute pain of left shoulder  M25 512      Dear Dr Johnson Lines:  Thank you for your recent referral of Oliver Barrios    Please review the attached evaluation summary from Bjorn's recent visit  Please verify that you agree with the plan of care by signing the attached order  If you have any questions or concerns, please do not hesitate to call  I sincerely appreciate the opportunity to share in the care of one of your patients and hope to have another opportunity to work with you in the near future  Sincerely,    Cody Lieberman, PT    Referring Provider:      I certify that I have read the below Plan of Care and certify the need for these services furnished under this plan of treatment while under my care  Joycelyn Vitale MD  [facility] [address]  Via Fax: [phone]    Please SIGN ABOVE and return THIS PAGE ONLY to Fax # [phone]        Today's date: 3/18/2021  Patient name: Oliver Barrios  : 1950  MRN: [id number]  Referring provider: Troy Gabriel MD  Dx:   Encounter Diagnosis     ICD-10-CM    1  Lumbar radiculopathy  M54 16    2  Difficulty walking  R26 2    3  Neck pain  M54 2    4  Aftercare following surgery  Z48 89    5  Acute pain of left shoulder  M25 512      Subjective:  No new c/o pain today  Objective: See treatment log below  Harvinder Lombardi continues to be advised to follow his home exercise program as tolerated  When Harvinder Lombardi is feeling good he follows his rehab exercises in the gym and on other days he follows his home exercise program at home as tolerated    In the future we plan on having Bjorn stay at home and follow his home exercise program for four to six weeks and than assess for either more Rehab treatments or discharge from Rehab care  Assessment: Tolerated treatment well  Patient exhibited good technique with therapeutic exercises and would benefit from continued PT  Bjorn's goals are to continue to improve with his rehab program and improve with functional mobility, speed, repetition and decreased c/o pain with his gym and home exercise program   Bjorn's final goal for his rehab is to be discharged from Rehab care after obtaining his full functional rehab potential     Plan: Continue per plan of care  Progress treatment as tolerated  Precautions:  Lumbar and Neck Issues    All treatments below will be provided with a focus on strengthening, flexibility, ROM, postural,   endurance and any possible swelling and pain which may be present without ignoring   neural issues involving balance, coordination and proprioception which is also important   and necessary to provide full functional mobility and quality care  Daily Treatment Log  Manual  3/18    3/15   MT, ROM 30'    20'   HEP        Exercise Log 3/18    3/15   Balance Board        Chair Squats        BOSU-Walk        Foam Pad SLR,Hip/KneeFl,Step Ups        Foam Beam        P-Bar-GT-Forward, Backward,Side-Even & Dips        Monster Steps        Fitter-Slalom        T/G-Squats,PF        W/P-PNF,IR,ER,PU,PS:Top,Mid,Bot        NuStep        Maria R-BP,Lats,PD,Row        Maria R-TrunkFlex/Ext 65# 30x    65# 30x   Euddylr-PI-Tv 2x2' 2x1'    2x2' 2x1'   NK Table        TM        UBC        Bike        Mat-GreenBallDKTC,HipRot,Bridges        ME, PE 15'    15'           Modalities 3/18    3/15   MH / PE / ES 21'    25'       Attestation signed by Shirin Foster PT at 3/19/2021  3:47 PM:  I supervised the visit    We discussed the case to ensure appropriate continuation and progression of care and I reviewed the documentation  PT Re-Evaluation   Today's date: 3/18/2021    Patient name: Nicholas Garcia  : 1950  MRN: [id number]  Referring provider: Bennie Parry MD  Dx:   Encounter Diagnosis     ICD-10-CM    1  Lumbar radiculopathy  M54 16    2  Difficulty walking  R26 2    3  Neck pain  M54 2    4  Aftercare following surgery  Z48 89    5  Acute pain of left shoulder  M25 512      Assessment  Assessment details: Patient states he feels a lot better than when he started his rehab  He has a lot less pain  He can stand longer and walk further  He is sleeping better  Impairments: abnormal or restricted ROM, impaired balance, impaired physical strength, pain with function and safety issue  Understanding of Dx/Px/POC: excellent   Prognosis: good    Goals  STG  2-4 weeks:   Increase lumbar spine AROM by 2-4 degrees  Increase lower extremity strength by 2-4 lbs in all weak areas  Improve sitting posture and body mechanics  Increase standing/ADL tolerance minutes  Decrease pain by 25-50% with standing, walking, and stairs  Initiate HEP  LTG  6-8 weeks:   Demonstrate normal lumbar rotation  Decrease pain to 1-2/10 with activity  Increase lower extremity strength by 10-20 lbs in all weak areas  Improve spinal stability  Improve gait pattern and balance  Decrease limitations with standing, walking and ADL's  DC with HEP  Plan  Plan details: All planned modality interventions and planned therapy interventions are provided PRN    Patient would benefit from: PT eval and skilled physical therapy  Planned modality interventions: TENS, ultrasound and unattended electrical stimulation  Planned therapy interventions: joint mobilization, manual therapy, balance, balance/weight bearing training, neuromuscular re-education, patient education, postural training, self care, coordination, flexibility, gait training, graded exercise, home exercise program, stretching, strengthening, therapeutic activities, therapeutic exercise, therapeutic training and transfer training  Frequency: 3x week  Duration in weeks: 12  Treatment plan discussed with: patient      Subjective Evaluation    Pain  Quality: discomfort, knife-like, pulling, squeezing, sharp, tight and throbbing  Relieving factors: support, rest, relaxation and change in position  Aggravating factors: lifting, running, stair climbing, walking and standing  Progression: improved    Treatments  Previous treatment: physical therapy  Current treatment: physical therapy  Patient Goals  Patient goals for therapy: decreased pain, improved balance, increased motion, return to work, return to [employer] activities, independence with ADLs/IADLs and increased strength      Date of onset:  9/5/2020    Date of Surgery:  None    History of Present Episode: 10/29/2020  Sid Beach states his low back and neck region have flared up  He reports no recent history or trauma or injury  He did fall off a ladder about 6 months ago and his back has been flared up since that incident  Past Medical History:    10/29/2020  Sid Beach reports left shoulder surgery  B TKR  Neck fusion C3-5  Muscle stimulator in his back  Previous Level of Functional Ability:  10/29/2020  Sid Beach states his back and neck had been doing OK  Inspection / Palpation:  Lumbar:  10/29/2020  Mesomorphic / Endomorphic body type  No signs of infection  No signs of wounds  No signs of drainage  No signs of ecchymotic regions  No signs of erythremic regions  Mild to moderate signs of muscle spasm  Mild to moderate signs of muscle guarding  Mild to moderate signs of tenderness reported to palpation  Mild signs of atrophy noted  No signs of swelling  No signs of a surgery site  Current conditions appears consistent with recent acute   episode  Chief Complaints:  10/29/2020  Bjorn reports moderate to severe difficulty with standing   Sid Beach reports moderate to severe difficulty with walking  Lamonte Keller reports moderate difficulty with movement / use of his lumbar region  Lamonte Keller reports moderate difficulty with use of stairs  Lamonte Keller reports severe difficulty with running  Lamonet Keller reports severe difficulty with jumping  Lamonte Keller reports moderate difficulty with use of inclines  Lamonte Keller reports moderate to severe difficulty with sleeping  Lamonte Keller reports mild to moderate difficulty with his strength and endurance  Lamonte Keller reports mild to moderate limitations with his range of motion  Lamonte Keller reports mild to moderate difficulty lying on his lumbar region  Lamonte Keller reports mild to moderate difficulty twisting / turning his lumbar region      LUMBAR PAIN     Resting Palpation Bending Extending Walking Lifting   10/29/2020 3 3-5 5-7 3-5 5-10 3-7   12/8/2020 2 2-4 3-5 2-4 3-8 2-6   1/12/2021 1-2 1-3 2-4 1-3 2-7 2-5   2/16/2021 0-2 0-3 1-3 0-2 2-6 1-4   3/18/2021  0-1 0-1 0-2 0-1 1-3 0-2     LUMBAR PAIN     Pulling Pushing Sleeping Twisting Standing   10/29/2020 3-8 3-8 5-10 5-10 5-10   12/8/2020 2-7 2-7 4-8 4-8 4-8   1/12/2021 2-6 2-6 3-7 3-7 3-7   2/16/2021 2-5 2-5 2-6 2-6 2-6   3/18/2021 0-2 0-2 0-3 0-3 0-3     LUMBAR AROM Flexion Extension Rotation Right   10/29/2020 45° 6° 32°   12/8/2020 52° 8° 41°   1/12/2021 58° 9° 43°   2/16/2021 63° 10° 45°   3/18/2021 65° 11° 47°     LUMBAR AROM Rotation Left Side Bending Right Side Bending Left   10/29/2020 31° 24° 25°   12/8/2020 35° 30° 31°   1/12/2021 39° 34° 34°   2/16/2021 42° 37° 37°   3/18/2021 45° 42° 42°     LUMBAR MMT / PAIN Flexion Extension Rotation Right   10/29/2020 0/10  19 lbs 0/10  20 lbs 0/10  18 lbs   12/8/2020 0/10  24 lbs 0/10  24 lbs 0/10  23 lbs   1/12/2021 0/10  28 lbs 0/10  28 lbs 0/10  26 lbs   2/16/2021 0/10  32 lbs 0/10  33 lbs 0/10  30 lbs   3/18/2021 0/10  35 lbs 0/10  36 lbs 0/10  34 lbs     LUMBAR MMT / PAIN Rotation Left Side Bend Right Side Bend Left   10/29/2020 0/10  19 lbs 0/10  20 lbs 0/10  21 lbs   12/8/2020 0/10  23 lbs 0/10  24 lbs 0/10  25 lbs   1/12/2021 0/10  26 lbs 0/10  28 lbs 0/10  28 lbs   2/16/2021 0/10  30 lbs 0/10  32 lbs 0/10  33 lbs   3/18/2021 0/10  34 lbs 0/10  35 lbs 0/10  36 lbs     LE MMT / PAIN Dorsiflexion Plantarflexion Knee flexion Knee extension   10/29/2020 Rt 5/10  24 lbs 5/10  28 lbs 0/10  31 lbs 0/10  32 lbs   10/29/2020 Lt 0/10  31 lbs 0/10  32 lbs 0/10  35 lbs 0/10  36 lbs     LE MMT / PAIN Hip Flexion Hip Abduction Hip Adduction   10/29/2020  Rt 5/10  10 lbs 0/10  15 lbs 0/10  14 lbs   10/29/2020  Lt 4/10  18 lbs 0/10  21 lbs 0/10  22 lbs     LUMBAR SCREEN Referred Pain Sacroiliac Joint test Squish Test Straight Leg  Raise   10/29/2020 Rt Negative Negative Negative Negative   10/29/2020 Lt Negative Negative Negative Negative     LUMBAR SCREEN Valsalva Gapping Bowstring sign Hip Bursitis   10/29/2020 Rt Negative Negative Negative Negative   10/29/2020 Lt Negative Negative Negative Negative     Precautions:  Lumbar and Neck Issues

## 2021-03-19 NOTE — PROGRESS NOTES
PT Re-Evaluation   Today's date: 3/18/2021    Patient name: Meño Moreau  : 1950  MRN: [id number]  Referring provider: Zulema Cuevas MD  Dx:   Encounter Diagnosis     ICD-10-CM    1  Lumbar radiculopathy  M54 16    2  Difficulty walking  R26 2    3  Neck pain  M54 2    4  Aftercare following surgery  Z48 89    5  Acute pain of left shoulder  M25 512      Assessment  Assessment details: Patient states he feels a lot better than when he started his rehab  He has a lot less pain  He can stand longer and walk further  He is sleeping better  Impairments: abnormal or restricted ROM, impaired balance, impaired physical strength, pain with function and safety issue  Understanding of Dx/Px/POC: excellent   Prognosis: good    Goals  STG  2-4 weeks:   Increase lumbar spine AROM by 2-4 degrees  Increase lower extremity strength by 2-4 lbs in all weak areas  Improve sitting posture and body mechanics  Increase standing/ADL tolerance minutes  Decrease pain by 25-50% with standing, walking, and stairs  Initiate HEP  LTG  6-8 weeks:   Demonstrate normal lumbar rotation  Decrease pain to 1-2/10 with activity  Increase lower extremity strength by 10-20 lbs in all weak areas  Improve spinal stability  Improve gait pattern and balance  Decrease limitations with standing, walking and ADL's  DC with HEP  Plan  Plan details: All planned modality interventions and planned therapy interventions are provided PRN    Patient would benefit from: PT eval and skilled physical therapy  Planned modality interventions: TENS, ultrasound and unattended electrical stimulation  Planned therapy interventions: joint mobilization, manual therapy, balance, balance/weight bearing training, neuromuscular re-education, patient education, postural training, self care, coordination, flexibility, gait training, graded exercise, home exercise program, stretching, strengthening, therapeutic activities, therapeutic exercise, therapeutic training and transfer training  Frequency: 3x week  Duration in weeks: 12  Treatment plan discussed with: patient      Subjective Evaluation    Pain  Quality: discomfort, knife-like, pulling, squeezing, sharp, tight and throbbing  Relieving factors: support, rest, relaxation and change in position  Aggravating factors: lifting, running, stair climbing, walking and standing  Progression: improved    Treatments  Previous treatment: physical therapy  Current treatment: physical therapy  Patient Goals  Patient goals for therapy: decreased pain, improved balance, increased motion, return to work, return to [employer] activities, independence with ADLs/IADLs and increased strength      Date of onset:  9/5/2020    Date of Surgery:  None    History of Present Episode: 10/29/2020  Lesly Castro states his low back and neck region have flared up  He reports no recent history or trauma or injury  He did fall off a ladder about 6 months ago and his back has been flared up since that incident  Past Medical History:    10/29/2020  Lesly Gloria reports left shoulder surgery  B TKR  Neck fusion C3-5  Muscle stimulator in his back  Previous Level of Functional Ability:  10/29/2020  Lesly Gloria states his back and neck had been doing OK  Inspection / Palpation:  Lumbar:  10/29/2020  Mesomorphic / Endomorphic body type  No signs of infection  No signs of wounds  No signs of drainage  No signs of ecchymotic regions  No signs of erythremic regions  Mild to moderate signs of muscle spasm  Mild to moderate signs of muscle guarding  Mild to moderate signs of tenderness reported to palpation  Mild signs of atrophy noted  No signs of swelling  No signs of a surgery site  Current conditions appears consistent with recent acute   episode  Chief Complaints:  10/29/2020  Bjorn reports moderate to severe difficulty with standing  Lesly Gloria reports moderate to severe difficulty with walking    Lesly Gloria reports moderate difficulty with movement / use of his lumbar region  Alcon Saas reports moderate difficulty with use of stairs  Alcon Saas reports severe difficulty with running  Alcon Saas reports severe difficulty with jumping  Alcon Saas reports moderate difficulty with use of inclines  Alcon Saas reports moderate to severe difficulty with sleeping  Alcon Saas reports mild to moderate difficulty with his strength and endurance  Alcon Saas reports mild to moderate limitations with his range of motion  Alcon Saas reports mild to moderate difficulty lying on his lumbar region  Alcon Saas reports mild to moderate difficulty twisting / turning his lumbar region      LUMBAR PAIN     Resting Palpation Bending Extending Walking Lifting   10/29/2020 3 3-5 5-7 3-5 5-10 3-7   12/8/2020 2 2-4 3-5 2-4 3-8 2-6   1/12/2021 1-2 1-3 2-4 1-3 2-7 2-5   2/16/2021 0-2 0-3 1-3 0-2 2-6 1-4   3/18/2021  0-1 0-1 0-2 0-1 1-3 0-2     LUMBAR PAIN     Pulling Pushing Sleeping Twisting Standing   10/29/2020 3-8 3-8 5-10 5-10 5-10   12/8/2020 2-7 2-7 4-8 4-8 4-8   1/12/2021 2-6 2-6 3-7 3-7 3-7   2/16/2021 2-5 2-5 2-6 2-6 2-6   3/18/2021 0-2 0-2 0-3 0-3 0-3     LUMBAR AROM Flexion Extension Rotation Right   10/29/2020 45° 6° 32°   12/8/2020 52° 8° 41°   1/12/2021 58° 9° 43°   2/16/2021 63° 10° 45°   3/18/2021 65° 11° 47°     LUMBAR AROM Rotation Left Side Bending Right Side Bending Left   10/29/2020 31° 24° 25°   12/8/2020 35° 30° 31°   1/12/2021 39° 34° 34°   2/16/2021 42° 37° 37°   3/18/2021 45° 42° 42°     LUMBAR MMT / PAIN Flexion Extension Rotation Right   10/29/2020 0/10  19 lbs 0/10  20 lbs 0/10  18 lbs   12/8/2020 0/10  24 lbs 0/10  24 lbs 0/10  23 lbs   1/12/2021 0/10  28 lbs 0/10  28 lbs 0/10  26 lbs   2/16/2021 0/10  32 lbs 0/10  33 lbs 0/10  30 lbs   3/18/2021 0/10  35 lbs 0/10  36 lbs 0/10  34 lbs     LUMBAR MMT / PAIN Rotation Left Side Bend Right Side Bend Left   10/29/2020 0/10  19 lbs 0/10  20 lbs 0/10  21 lbs   12/8/2020 0/10  23 lbs 0/10  24 lbs 0/10  25 lbs   1/12/2021 0/10  26 lbs 0/10  28 lbs 0/10  28 lbs   2/16/2021 0/10  30 lbs 0/10  32 lbs 0/10  33 lbs   3/18/2021 0/10  34 lbs 0/10  35 lbs 0/10  36 lbs     LE MMT / PAIN Dorsiflexion Plantarflexion Knee flexion Knee extension   10/29/2020 Rt 5/10  24 lbs 5/10  28 lbs 0/10  31 lbs 0/10  32 lbs   10/29/2020 Lt 0/10  31 lbs 0/10  32 lbs 0/10  35 lbs 0/10  36 lbs     LE MMT / PAIN Hip Flexion Hip Abduction Hip Adduction   10/29/2020  Rt 5/10  10 lbs 0/10  15 lbs 0/10  14 lbs   10/29/2020  Lt 4/10  18 lbs 0/10  21 lbs 0/10  22 lbs     LUMBAR SCREEN Referred Pain Sacroiliac Joint test Squish Test Straight Leg  Raise   10/29/2020 Rt Negative Negative Negative Negative   10/29/2020 Lt Negative Negative Negative Negative     LUMBAR SCREEN Valsalva Gapping Bowstring sign Hip Bursitis   10/29/2020 Rt Negative Negative Negative Negative   10/29/2020 Lt Negative Negative Negative Negative     Precautions:  Lumbar and Neck Issues

## 2021-03-22 ENCOUNTER — TRANSCRIBE ORDERS (OUTPATIENT)
Dept: PHYSICAL THERAPY | Facility: CLINIC | Age: 71
End: 2021-03-22

## 2021-03-22 ENCOUNTER — OFFICE VISIT (OUTPATIENT)
Dept: PHYSICAL THERAPY | Facility: CLINIC | Age: 71
End: 2021-03-22
Payer: MEDICARE

## 2021-03-22 DIAGNOSIS — M54.2 NECK PAIN: ICD-10-CM

## 2021-03-22 DIAGNOSIS — R26.2 DIFFICULTY WALKING: ICD-10-CM

## 2021-03-22 DIAGNOSIS — M54.16 LUMBAR RADICULOPATHY: Primary | ICD-10-CM

## 2021-03-22 DIAGNOSIS — M25.512 ACUTE PAIN OF LEFT SHOULDER: ICD-10-CM

## 2021-03-22 DIAGNOSIS — Z48.89 AFTERCARE FOLLOWING SURGERY: ICD-10-CM

## 2021-03-22 PROCEDURE — 97112 NEUROMUSCULAR REEDUCATION: CPT

## 2021-03-22 PROCEDURE — 97140 MANUAL THERAPY 1/> REGIONS: CPT

## 2021-03-22 PROCEDURE — 97014 ELECTRIC STIMULATION THERAPY: CPT

## 2021-03-22 NOTE — PROGRESS NOTES
Today's date: 3/22/2021  Patient name: Elena Valdez  : 1950  MRN: [id number]  Referring provider: Hubert Delaney MD  Dx:   Encounter Diagnosis     ICD-10-CM    1  Lumbar radiculopathy  M54 16    2  Difficulty walking  R26 2    3  Neck pain  M54 2    4  Aftercare following surgery  Z48 89    5  Acute pain of left shoulder  M25 512      Subjective:  "I really like these new shoes I got  I even put them on so I could finish making supper  I had a great weekend  I feel like my body is finally straight and even "    Objective: See treatment log below  Alcon Blank continues to be advised to follow his home exercise program as tolerated  When Alcon Blank is feeling good he follows his rehab exercises in the gym and on other days he follows his home exercise program at home as tolerated  In the future we plan on having Bjorn stay at home and follow his home exercise program for four to six weeks and than assess for either more Rehab treatments or discharge from Rehab care  Assessment: Tolerated treatment well  Patient exhibited good technique with therapeutic exercises and would benefit from continued PT  Bjorn's goals are to continue to improve with his rehab program and improve with functional mobility, speed, repetition and decreased c/o pain with his gym and home exercise program   Bjorn's final goal for his rehab is to be discharged from Rehab care after obtaining his full functional rehab potential     Plan: Continue per plan of care  Progress treatment as tolerated  Precautions:  Lumbar and Neck Issues    All treatments below will be provided with a focus on strengthening, flexibility, ROM, postural,   endurance and any possible swelling and pain which may be present without ignoring   neural issues involving balance, coordination and proprioception which is also important   and necessary to provide full functional mobility and quality care        Daily Treatment Log  Manual  3/18 3/22 MT, ROM 30' 30'      HEP        Exercise Log 3/18 3/22      Balance Board        Chair Squats        BOSU-Walk        Foam Pad SLR,Hip/KneeFl,Step Ups        Foam Beam        P-Bar-GT-Forward, Backward,Side-Even & Dips        Monster Steps        Fitter-Slalom        T/G-Squats,PF        W/P-PNF,IR,ER,PU,PS:Top,Mid,Bot        NuStep        Maria R-BP,Lats,PD,Row        Maria R-TrunkFlex/Ext 65# 30x 65# 30x      Cifxjnn-UG-Yo 2x2' 2x1' 2x2' 2x1'      NK Table        TM        UBC        Bike        Mat-GreenBallDKTC,HipRot,Bridges        ME, PE 15' 15'              Modalities 3/18 3/22      MH / PE / ES 20' 20'

## 2021-03-23 ENCOUNTER — APPOINTMENT (OUTPATIENT)
Dept: PHYSICAL THERAPY | Facility: CLINIC | Age: 71
End: 2021-03-23
Payer: MEDICARE

## 2021-03-25 ENCOUNTER — OFFICE VISIT (OUTPATIENT)
Dept: PHYSICAL THERAPY | Facility: CLINIC | Age: 71
End: 2021-03-25
Payer: MEDICARE

## 2021-03-25 DIAGNOSIS — Z48.89 AFTERCARE FOLLOWING SURGERY: ICD-10-CM

## 2021-03-25 DIAGNOSIS — M54.2 NECK PAIN: ICD-10-CM

## 2021-03-25 DIAGNOSIS — R26.2 DIFFICULTY WALKING: ICD-10-CM

## 2021-03-25 DIAGNOSIS — M54.16 LUMBAR RADICULOPATHY: Primary | ICD-10-CM

## 2021-03-25 DIAGNOSIS — M25.512 ACUTE PAIN OF LEFT SHOULDER: ICD-10-CM

## 2021-03-25 PROCEDURE — 97014 ELECTRIC STIMULATION THERAPY: CPT

## 2021-03-25 PROCEDURE — 97140 MANUAL THERAPY 1/> REGIONS: CPT

## 2021-03-25 PROCEDURE — 97112 NEUROMUSCULAR REEDUCATION: CPT

## 2021-03-25 NOTE — PROGRESS NOTES
Today's date: 3/25/2021  Patient name: Dulce Thomason  : 1950  MRN: [id number]  Referring provider: Fredy Terrell MD  Dx:   Encounter Diagnosis     ICD-10-CM    1  Lumbar radiculopathy  M54 16    2  Difficulty walking  R26 2    3  Neck pain  M54 2    4  Aftercare following surgery  Z48 89    5  Acute pain of left shoulder  M25 512      Subjective:  No new c/o pain today  Objective: See treatment log below  Collette Arnett continues to be advised to follow his home exercise program as tolerated  When Collette Arnett is feeling good he follows his rehab exercises in the gym and on other days he follows his home exercise program at home as tolerated  In the future we plan on having Bjorn stay at home and follow his home exercise program for four to six weeks and than assess for either more Rehab treatments or discharge from Rehab care  Assessment: Tolerated treatment well  Patient exhibited good technique with therapeutic exercises and would benefit from continued PT  Bjorn's goals are to continue to improve with his rehab program and improve with functional mobility, speed, repetition and decreased c/o pain with his gym and home exercise program   Bjorn's final goal for his rehab is to be discharged from Rehab care after obtaining his full functional rehab potential     Plan: Continue per plan of care  Progress treatment as tolerated  Precautions:  Lumbar and Neck Issues    All treatments below will be provided with a focus on strengthening, flexibility, ROM, postural,   endurance and any possible swelling and pain which may be present without ignoring   neural issues involving balance, coordination and proprioception which is also important   and necessary to provide full functional mobility and quality care        Daily Treatment Log  Manual  3/18 3/22 3/25     MT, ROM 30' 30' 30'     HEP        Exercise Log 3/18 3/22 3/25     Balance Board        Chair Squats        BOSU-Walk        Foam Pad SLR,Hip/KneeFl,Step Ups        Foam Beam        P-Bar-GT-Forward, Backward,Side-Even & Dips        Monster Steps        Fitter-Slalom        T/G-Squats,PF        W/P-PNF,IR,ER,PU,PS:Top,Mid,Bot        NuStep        Maria R-BP,Lats,PD,Row        Maria R-TrunkFlex/Ext 65# 30x 65# 30x 105# 30x     Rzltwzm-PQ-Ua 2x2' 2x1' 2x2' 2x1' 2x2' 2x1'     NK Table        TM        UBC        Bike        Mat-GreenBallDKTC,HipRot,Bridges        ME, PE 15' 15' 15'             Modalities 3/18 3/22 3/25     MH / PE / ES 20' 20' 20'

## 2021-03-30 ENCOUNTER — OFFICE VISIT (OUTPATIENT)
Dept: PHYSICAL THERAPY | Facility: CLINIC | Age: 71
End: 2021-03-30
Payer: MEDICARE

## 2021-03-30 DIAGNOSIS — M25.512 ACUTE PAIN OF LEFT SHOULDER: ICD-10-CM

## 2021-03-30 DIAGNOSIS — R26.2 DIFFICULTY WALKING: ICD-10-CM

## 2021-03-30 DIAGNOSIS — Z48.89 AFTERCARE FOLLOWING SURGERY: ICD-10-CM

## 2021-03-30 DIAGNOSIS — M54.16 LUMBAR RADICULOPATHY: Primary | ICD-10-CM

## 2021-03-30 DIAGNOSIS — M54.2 NECK PAIN: ICD-10-CM

## 2021-03-30 PROCEDURE — 97014 ELECTRIC STIMULATION THERAPY: CPT | Performed by: PHYSICAL THERAPIST

## 2021-03-30 PROCEDURE — 97112 NEUROMUSCULAR REEDUCATION: CPT

## 2021-03-30 PROCEDURE — 97140 MANUAL THERAPY 1/> REGIONS: CPT

## 2021-03-30 PROCEDURE — 97164 PT RE-EVAL EST PLAN CARE: CPT | Performed by: PHYSICAL THERAPIST

## 2021-03-30 NOTE — PROGRESS NOTES
Daily Note     Today's date: 3/30/2021  Patient name: Marcela Bryson  : 1950  MRN: [id number]  Referring provider: Parul Muñoz MD  Dx:   Encounter Diagnosis     ICD-10-CM    1  Lumbar radiculopathy  M54 16    2  Difficulty walking  R26 2    3  Neck pain  M54 2    4  Aftercare following surgery  Z48 89    5  Acute pain of left shoulder  M25 512                   Subjective: No new c/o pain today  Objective: See treatment diary below      Assessment: Tolerated treatment well  Patient exhibited good technique with therapeutic exercises and would benefit from continued PT      Plan: Continue per plan of care  Progress treatment as tolerated  Precautions:  Lumbar and Neck Issues    All treatments below will be provided with a focus on strengthening, flexibility, ROM, postural,   endurance and any possible swelling and pain which may be present without ignoring   neural issues involving balance, coordination and proprioception which is also important   and necessary to provide full functional mobility and quality care        Daily Treatment Log  Manual  3/18 3/22 3/25 3/30    MT, ROM 30' 30' 30' 30'    HEP        Exercise Log 3/18 3/22 3/25 3/30    Balance Board        Chair Squats        BOSU-Walk        Foam Pad SLR,Hip/KneeFl,Step Ups        Foam Beam        P-Bar-GT-Forward, Backward,Side-Even & Dips        Monster Steps        Fitter-Slalom        T/G-Squats,PF        W/P-PNF,IR,ER,PU,PS:Top,Mid,Bot        NuStep        Maria R-BP,Lats,PD,Row        Maria R-TrunkFlex/Ext 65# 30x 65# 30x 105# 30x 115# 30x    Djamyxl-HJ-Je 2x2' 2x1' 2x2' 2x1' 2x2' 2x1' 2x2' 2x1'    NK Table        TM        UBC        Bike        Mat-GreenBallDKTC,HipRot,Bridges        ME, PE 15' 15' 15' 15'            Modalities 3/18 3/22 3/25 3/30    MH / PE / ES 20' 20' 20'

## 2021-04-08 NOTE — PROGRESS NOTES
PT Discharge  Today's date: 3/30/2021    Patient name: Kj Scott  : 1950  MRN: [id number]  Referring provider: Nicole Irvin MD  Dx:   Encounter Diagnosis     ICD-10-CM    1  Lumbar radiculopathy  M54 16    2  Difficulty walking  R26 2    3  Neck pain  M54 2    4  Aftercare following surgery  Z48 89    5  Acute pain of left shoulder  M25 512      Assessment  Assessment details: Patient has progressed well  Between his low back, mid thoracic and neck issues with pain and limitations he has made major progress  He stated today is the best he has felt in over three years  His standing and walking is much better  He is sleeping the best he has in many years per patient  Patient is to follow his HEP  Patient is discharged effective today  Impairments: abnormal or restricted ROM, abnormal movement, activity intolerance, impaired balance, pain with function, safety issue and weight-bearing intolerance  Understanding of Dx/Px/POC: excellent   Prognosis: good    Goals  STG  2-4 weeks:   Increase lumbar spine AROM by 2-4 degrees  Increase lower extremity strength by 2-4 lbs in all weak areas  Improve sitting posture and body mechanics  Increase standing/ADL tolerance minutes  Decrease pain by 25-50% with standing, walking, and stairs  Initiate HEP  LTG  6-8 weeks:   Demonstrate normal lumbar rotation  Decrease pain to 1-2/10 with activity  Increase lower extremity strength by 10-20 lbs in all weak areas  Improve spinal stability  Improve gait pattern and balance  Decrease limitations with standing, walking and ADL's  DC with HEP  Plan  Plan details: All planned modality interventions and planned therapy interventions are provided PRN    Patient would benefit from: PT eval and skilled physical therapy  Planned modality interventions: TENS, ultrasound and unattended electrical stimulation  Planned therapy interventions: joint mobilization, manual therapy, balance, balance/weight bearing training, neuromuscular re-education, patient education, self care, strengthening, stretching, therapeutic activities, therapeutic exercise, therapeutic training, transfer training, home exercise program, graded exercise, gait training, flexibility and coordination  Frequency: 3x week  Duration in weeks: 12  Treatment plan discussed with: patient        Subjective Evaluation    Pain  Quality: discomfort, pulling and tight  Relieving factors: support, rest, relaxation and change in position  Aggravating factors: lifting, running, stair climbing, walking and standing  Progression: improved    Treatments  Previous treatment: physical therapy  Current treatment: physical therapy  Patient Goals  Patient goals for therapy: decreased pain, increased motion, improved balance, return to work, return to [employer] activities, independence with ADLs/IADLs and increased strength  Patient goal: Discharge effective today  Objective     Date of onset:  9/5/2020    Date of Surgery:  None    History of Present Episode: 10/29/2020  Daniel Booth states his low back and neck region have flared up  He reports no recent history or trauma or injury  He did fall off a ladder about 6 months ago and his back has been flared up since that incident  Past Medical History:    10/29/2020  Daniel Booth reports left shoulder surgery  B TKR  Neck fusion C3-5  Muscle stimulator in his back  Previous Level of Functional Ability:  10/29/2020  Daniel Booth states his back and neck had been doing OK  Inspection / Palpation:  Lumbar:  10/29/2020  Mesomorphic / Endomorphic body type  No signs of infection  No signs of wounds  No signs of drainage  No signs of ecchymotic regions  No signs of erythremic regions  Mild to moderate signs of muscle spasm  Mild to moderate signs of muscle guarding  Mild to moderate signs of tenderness reported to palpation  Mild signs of atrophy noted  No signs of swelling    No signs of a surgery site  Current conditions appears consistent with recent acute   episode  Chief Complaints:  10/29/2020  Bjorn reports moderate to severe difficulty with standing  Tom Church reports moderate to severe difficulty with walking  Tom Church reports moderate difficulty with movement / use of his lumbar region  Tom Coxo reports moderate difficulty with use of stairs  Tom Coxo reports severe difficulty with running  Tom Coxo reports severe difficulty with jumping  Tom Coxo reports moderate difficulty with use of inclines  Tom Coxo reports moderate to severe difficulty with sleeping  Tom Church reports mild to moderate difficulty with his strength and endurance  Tom Church reports mild to moderate limitations with his range of motion  Tom Church reports mild to moderate difficulty lying on his lumbar region  Tom Church reports mild to moderate difficulty twisting / turning his lumbar region      LUMBAR PAIN     Resting Palpation Bending Extending Walking Lifting   10/29/2020 3 3-5 5-7 3-5 5-10 3-7   12/8/2020 2 2-4 3-5 2-4 3-8 2-6   1/12/2021 1-2 1-3 2-4 1-3 2-7 2-5   2/16/2021 0-2 0-3 1-3 0-2 2-6 1-4   3/18/2021  0-1 0-1 0-2 0-1 1-3 0-2     LUMBAR PAIN     Pulling Pushing Sleeping Twisting Standing   10/29/2020 3-8 3-8 5-10 5-10 5-10   12/8/2020 2-7 2-7 4-8 4-8 4-8   1/12/2021 2-6 2-6 3-7 3-7 3-7   2/16/2021 2-5 2-5 2-6 2-6 2-6   3/18/2021 0-2 0-2 0-3 0-3 0-3     LUMBAR AROM Flexion Extension Rotation Right   10/29/2020 45° 6° 32°   12/8/2020 52° 8° 41°   1/12/2021 58° 9° 43°   2/16/2021 63° 10° 45°   3/18/2021 65° 11° 47°     LUMBAR AROM Rotation Left Side Bending Right Side Bending Left   10/29/2020 31° 24° 25°   12/8/2020 35° 30° 31°   1/12/2021 39° 34° 34°   2/16/2021 42° 37° 37°   3/18/2021 45° 42° 42°     LUMBAR MMT / PAIN Flexion Extension Rotation Right   10/29/2020 0/10  19 lbs 0/10  20 lbs 0/10  18 lbs   12/8/2020 0/10  24 lbs 0/10  24 lbs 0/10  23 lbs   1/12/2021 0/10  28 lbs 0/10  28 lbs 0/10  26 lbs 2/16/2021 0/10  32 lbs 0/10  33 lbs 0/10  30 lbs   3/18/2021 0/10  35 lbs 0/10  36 lbs 0/10  34 lbs     LUMBAR MMT / PAIN Rotation Left Side Bend Right Side Bend Left   10/29/2020 0/10  19 lbs 0/10  20 lbs 0/10  21 lbs   12/8/2020 0/10  23 lbs 0/10  24 lbs 0/10  25 lbs   1/12/2021 0/10  26 lbs 0/10  28 lbs 0/10  28 lbs   2/16/2021 0/10  30 lbs 0/10  32 lbs 0/10  33 lbs   3/18/2021 0/10  34 lbs 0/10  35 lbs 0/10  36 lbs     LE MMT / PAIN Dorsiflexion Plantarflexion Knee flexion Knee extension   10/29/2020 Rt 5/10  24 lbs 5/10  28 lbs 0/10  31 lbs 0/10  32 lbs   10/29/2020 Lt 0/10  31 lbs 0/10  32 lbs 0/10  35 lbs 0/10  36 lbs     LE MMT / PAIN Hip Flexion Hip Abduction Hip Adduction   10/29/2020  Rt 5/10  10 lbs 0/10  15 lbs 0/10  14 lbs   10/29/2020  Lt 4/10  18 lbs 0/10  21 lbs 0/10  22 lbs     LUMBAR SCREEN Referred Pain Sacroiliac Joint test Squish Test Straight Leg  Raise   10/29/2020 Rt Negative Negative Negative Negative   10/29/2020 Lt Negative Negative Negative Negative     LUMBAR SCREEN Valsalva Gapping Bowstring sign Hip Bursitis   10/29/2020 Rt Negative Negative Negative Negative   10/29/2020 Lt Negative Negative Negative Negative     Precautions:  Lumbar and Neck Issues
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
## 2022-09-12 ENCOUNTER — EVALUATION (OUTPATIENT)
Dept: PHYSICAL THERAPY | Facility: CLINIC | Age: 72
End: 2022-09-12
Payer: MEDICARE

## 2022-09-12 DIAGNOSIS — M54.2 CERVICALGIA: Primary | ICD-10-CM

## 2022-09-12 DIAGNOSIS — M54.50 CHRONIC LOW BACK PAIN, UNSPECIFIED BACK PAIN LATERALITY, UNSPECIFIED WHETHER SCIATICA PRESENT: ICD-10-CM

## 2022-09-12 DIAGNOSIS — G89.29 CHRONIC LOW BACK PAIN, UNSPECIFIED BACK PAIN LATERALITY, UNSPECIFIED WHETHER SCIATICA PRESENT: ICD-10-CM

## 2022-09-12 PROCEDURE — 97163 PT EVAL HIGH COMPLEX 45 MIN: CPT | Performed by: PHYSICAL THERAPIST
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
## 2022-09-18 NOTE — PROGRESS NOTES
Daily Note     Today's date: 2022  Patient name: Hamzah Birmingham  : 1950  MRN: [id number]  Referring provider: Ameya Jovel DO  Dx:   Encounter Diagnosis     ICD-10-CM    1  Cervicalgia  M54 2    2  Chronic low back pain, unspecified back pain laterality, unspecified whether sciatica present  M54 50     G89 29                 Subjective:  Patient reports continuation of increase pain levels in the neck and Lumbar with difficulty with all standing, walking, and lifting activities  Objective: See treatment diary below      Assessment: Tolerated treatment well  PT notes start of POC with focus on spinal stability and manual therapy to decrease symptoms and improve functional limitations to meet therapy goals  PT notes continuation of decrease ROM and strength t/o the cervical and lumbar spine with need for continuation of skilled therapy  Plan: Continue per plan of care        Precautions:  Cervical Fusion      Manuals       CROM mobs and stretching with manual cervical traction  10 min                               Neuro Re-Ed        Scapular pinch into wall         Shoulder wall slides IYTA        SA rolls         Stand OAL   10x Bilat       TB MTP and LTP         Supine Chin Tucks   10x5" Hold       TB bilateral ER and horizontal ABD         Bridge and with ABD   2x10 Bridge Only       Ther Ex        Nu-step   10 min L1       UBE         Supine SKTC         Caudel glide   5x15" Hold Bilat       Doorway pec stretch         Cervical ext and rotation with towel support         Supine TLR  10x5" Hold Bilat      HEP update/review   15 min       Ther Activity                        Gait Training                        Modalities        MHP  PRN

## 2022-09-19 ENCOUNTER — OFFICE VISIT (OUTPATIENT)
Dept: PHYSICAL THERAPY | Facility: CLINIC | Age: 72
End: 2022-09-19
Payer: MEDICARE

## 2022-09-19 DIAGNOSIS — M54.2 CERVICALGIA: Primary | ICD-10-CM

## 2022-09-19 DIAGNOSIS — M54.50 CHRONIC LOW BACK PAIN, UNSPECIFIED BACK PAIN LATERALITY, UNSPECIFIED WHETHER SCIATICA PRESENT: ICD-10-CM

## 2022-09-19 DIAGNOSIS — G89.29 CHRONIC LOW BACK PAIN, UNSPECIFIED BACK PAIN LATERALITY, UNSPECIFIED WHETHER SCIATICA PRESENT: ICD-10-CM

## 2022-09-19 PROCEDURE — 97110 THERAPEUTIC EXERCISES: CPT | Performed by: PHYSICAL THERAPIST

## 2022-09-19 PROCEDURE — 97140 MANUAL THERAPY 1/> REGIONS: CPT | Performed by: PHYSICAL THERAPIST

## 2022-09-19 PROCEDURE — 97535 SELF CARE MNGMENT TRAINING: CPT | Performed by: PHYSICAL THERAPIST

## 2022-09-22 NOTE — PROGRESS NOTES
Daily Note     Today's date: 2022  Patient name: Delmy Hernandez  : 1950  MRN: [id number]  Referring provider: Andrew Lord DO  Dx:   Encounter Diagnosis     ICD-10-CM    1  Cervicalgia  M54 2    2  Chronic low back pain, unspecified back pain laterality, unspecified whether sciatica present  M54 50     G89 29                   Subjective:  Patient reports the back and neck continue with increase pain levels and tightness  Patient reports he had to lay down for a few hours after last session secondary to fatigue and increase pain levels  Objective: See treatment diary below      Assessment: Tolerated treatment well  PT notes continuation of decrease ROM and strength t/o the cervical and lumbar spine with trunk/core weakness with need for continuation of skilled therapy  Plan: Continue per plan of care        Precautions:  Cervical Fusion      Manuals      CROM mobs and stretching with manual cervical traction  10 min  10 min                              Neuro Re-Ed        Scapular pinch into wall    10x5" Hold      Shoulder wall slides IYTA        SA rolls         Stand OAL   10x Bilat  10x Bilat      TB MTP and LTP    15x Each Green      Supine Chin Tucks   10x5" Hold  10x5" Hold      TB bilateral ER and horizontal ABD         Bridge and with ABD   2x10 Bridge Only  2x10 Bridge Only      Ther Ex        Nu-step   10 min L1  10 min L1      UBE         Supine SKTC         Caudel glide   5x15" Hold Bilat  5x15" Hold Bilat      Doorway pec stretch         Cervical ext and rotation with towel support         Supine LTR  10x5" Hold Bilat 10x5" Hold Bilat      HEP update/review   15 min       Ther Activity                        Gait Training                        Modalities        MHP  PRN   15 min LB and Bilat UB

## 2022-09-23 ENCOUNTER — OFFICE VISIT (OUTPATIENT)
Dept: PHYSICAL THERAPY | Facility: CLINIC | Age: 72
End: 2022-09-23
Payer: MEDICARE

## 2022-09-23 DIAGNOSIS — M54.2 CERVICALGIA: Primary | ICD-10-CM

## 2022-09-23 DIAGNOSIS — G89.29 CHRONIC LOW BACK PAIN, UNSPECIFIED BACK PAIN LATERALITY, UNSPECIFIED WHETHER SCIATICA PRESENT: ICD-10-CM

## 2022-09-23 DIAGNOSIS — M54.50 CHRONIC LOW BACK PAIN, UNSPECIFIED BACK PAIN LATERALITY, UNSPECIFIED WHETHER SCIATICA PRESENT: ICD-10-CM

## 2022-09-23 PROCEDURE — 97112 NEUROMUSCULAR REEDUCATION: CPT | Performed by: PHYSICAL THERAPIST

## 2022-09-23 PROCEDURE — 97140 MANUAL THERAPY 1/> REGIONS: CPT | Performed by: PHYSICAL THERAPIST

## 2022-09-23 PROCEDURE — 97110 THERAPEUTIC EXERCISES: CPT | Performed by: PHYSICAL THERAPIST

## 2022-09-23 NOTE — PROGRESS NOTES
Daily Note     Today's date: 2022  Patient name: Nicho Erickson  : 1950  MRN: [id number]  Referring provider: Mario Alberto Vu DO  Dx:   Encounter Diagnosis     ICD-10-CM    1  Cervicalgia  M54 2    2  Chronic low back pain, unspecified back pain laterality, unspecified whether sciatica present  M54 50     G89 29                   Subjective:  Patient reports he felt a slight improvement with ability to stand longer while at home but continuation of difficulty with any lifting and trunk movement activities  Objective: See treatment diary below      Assessment: Tolerated treatment well  PT notes continuation of decrease ROM and strength t/o the cervical and lumbar spine with trunk/core weakness and poor spinal stability with need for continuation of skilled therapy  Plan: Continue per plan of care        Precautions:  Cervical Fusion      Manuals     CROM mobs and stretching with manual cervical traction  10 min  10 min  10 min                             Neuro Re-Ed        Scapular pinch into wall    10x5" Hold  10x5" Hold     Shoulder wall slides IYTA    Stand lumbar extension against the wall   10x     SA rolls         Stand OAL   10x Bilat  10x Bilat  10x Bilat     TB MTP and LTP    15x Each Green  15x Each   Green     Supine Chin Tucks   10x5" Hold  10x5" Hold  10x5" Hold     TB bilateral ER and horizontal ABD     Seated 3 way trunk flexion  5x5" Hold Each     Bridge and with ABD   2x10 Bridge Only  2x10 Bridge Only  2x10 Bridge Only     Ther Ex        Nu-step   10 min L1  10 min L1  10 min L1     UBE         Supine SKTC         Caudel glide   5x15" Hold Bilat  5x15" Hold Bilat  5x15" Hold Bilat     Doorway pec stretch         Cervical ext and rotation with towel support         Supine LTR  10x5" Hold Bilat 10x5" Hold Bilat  10x5" Hold Bilat     HEP update/review   15 min       Ther Activity                        Gait Training Modalities        MHP  PRN   15 min LB and Bilat UB

## 2022-09-26 ENCOUNTER — OFFICE VISIT (OUTPATIENT)
Dept: PHYSICAL THERAPY | Facility: CLINIC | Age: 72
End: 2022-09-26
Payer: MEDICARE

## 2022-09-26 DIAGNOSIS — G89.29 CHRONIC LOW BACK PAIN, UNSPECIFIED BACK PAIN LATERALITY, UNSPECIFIED WHETHER SCIATICA PRESENT: ICD-10-CM

## 2022-09-26 DIAGNOSIS — M54.50 CHRONIC LOW BACK PAIN, UNSPECIFIED BACK PAIN LATERALITY, UNSPECIFIED WHETHER SCIATICA PRESENT: ICD-10-CM

## 2022-09-26 DIAGNOSIS — M54.2 CERVICALGIA: Primary | ICD-10-CM

## 2022-09-26 PROCEDURE — 97110 THERAPEUTIC EXERCISES: CPT | Performed by: PHYSICAL THERAPIST

## 2022-09-26 PROCEDURE — 97112 NEUROMUSCULAR REEDUCATION: CPT | Performed by: PHYSICAL THERAPIST

## 2022-09-26 PROCEDURE — 97140 MANUAL THERAPY 1/> REGIONS: CPT | Performed by: PHYSICAL THERAPIST

## 2022-09-29 NOTE — PROGRESS NOTES
Daily Note     Today's date: 2022  Patient name: Yury Cormier  : 1950  MRN: [id number]  Referring provider: Paz Randolph DO  Dx:   Encounter Diagnosis     ICD-10-CM    1  Cervicalgia  M54 2    2  Chronic low back pain, unspecified back pain laterality, unspecified whether sciatica present  M54 50     G89 29                   Subjective:  Patient reports continuation of tightness in the neck and UB as well as pain in the LB with difficulty with all ADL  Patient reports he continues with fatigue post session with need to lay down and rest for a few hours but overall feels he is able to tolerate more standing and walking activities  Objective: See treatment diary below      Assessment: Tolerated treatment well  PT notes continuation of decrease spinal stability with trunk/core weakness with need for continuation of skilled therapy  Plan: Continue per plan of care        Precautions:  Cervical Fusion      Manuals    CROM mobs and stretching with manual cervical traction  10 min  10 min  10 min  10 min                            Neuro Re-Ed        Scapular pinch into wall    10x5" Hold  10x5" Hold  HEP    Shoulder wall slides IYTA    Stand lumbar extension against the wall   10x  10x    SA rolls      TB Seated resist trunk rotation   10x Bilat Green    Stand OAL   10x Bilat  10x Bilat  10x Bilat  10x Bilat    TB MTP and LTP    15x Each Green  15x Each   Green  20x Each   Green    Supine Chin Tucks   10x5" Hold  10x5" Hold  10x5" Hold  10x5" Hold    TB bilateral ER and horizontal ABD     Seated 3 way trunk flexion  5x5" Hold Each  10x5" Hold Each    Bridge and with ABD   2x10 Bridge Only  2x10 Bridge Only  2x10 Bridge Only  2x10 Bridge Only    Ther Ex        Nu-step   10 min L1  10 min L1  10 min L1  10 min L1   UBE         Supine SKTC         Caudel glide   5x15" Hold Bilat  5x15" Hold Bilat  5x15" Hold Bilat  5x15" Hold Bilat    Doorway pec stretch Seated PNF   10x Bilat    Cervical ext and rotation with towel support         Supine LTR  10x5" Hold Bilat 10x5" Hold Bilat  10x5" Hold Bilat  10x5" Hold Bilat    HEP update/review   15 min       Ther Activity                        Gait Training                        Modalities        MHP  PRN   15 min LB and Bilat UB   15 min LB and Bilat UB

## 2022-09-30 ENCOUNTER — OFFICE VISIT (OUTPATIENT)
Dept: PHYSICAL THERAPY | Facility: CLINIC | Age: 72
End: 2022-09-30
Payer: MEDICARE

## 2022-09-30 DIAGNOSIS — M54.2 CERVICALGIA: Primary | ICD-10-CM

## 2022-09-30 DIAGNOSIS — M54.50 CHRONIC LOW BACK PAIN, UNSPECIFIED BACK PAIN LATERALITY, UNSPECIFIED WHETHER SCIATICA PRESENT: ICD-10-CM

## 2022-09-30 DIAGNOSIS — G89.29 CHRONIC LOW BACK PAIN, UNSPECIFIED BACK PAIN LATERALITY, UNSPECIFIED WHETHER SCIATICA PRESENT: ICD-10-CM

## 2022-09-30 PROCEDURE — 97112 NEUROMUSCULAR REEDUCATION: CPT | Performed by: PHYSICAL THERAPIST

## 2022-09-30 PROCEDURE — 97140 MANUAL THERAPY 1/> REGIONS: CPT | Performed by: PHYSICAL THERAPIST

## 2022-09-30 PROCEDURE — 97110 THERAPEUTIC EXERCISES: CPT | Performed by: PHYSICAL THERAPIST

## 2022-09-30 NOTE — PROGRESS NOTES
Daily Note     Today's date: 10/3/2022  Patient name: Leilani Lagunas  : 1950  MRN: [id number]  Referring provider: Rody Mckeon DO  Dx:   Encounter Diagnosis     ICD-10-CM    1  Cervicalgia  M54 2    2  Chronic low back pain, unspecified back pain laterality, unspecified whether sciatica present  M54 50     G89 29                   Subjective:  Patient reports the neck and UB are feeling looser but continuation of mid and low back tightness with difficulty with standing, walking and lifting activities  Patient questions why his mid and LB are always tightening up  Objective: See treatment diary below      Assessment: Tolerated treatment well  PT notes continuation of decrease spinal stability with trunk/core weakness with need for continuation of skilled therapy  Plan: Continue per plan of care        Precautions:  Cervical Fusion      Manuals 9/19 9/22 9/26 9/30 10/3   CROM mobs and stretching with manual cervical traction 10 min  10 min  10 min  10 min  10 min                            Neuro Re-Ed        Scapular pinch into wall   10x5" Hold  10x5" Hold  HEP     Shoulder wall slides IYTA   Stand lumbar extension against the wall   10x  10x  Seated Thoracic extension   10x    SA rolls     TB Seated resist trunk rotation   10x Bilat Green  NT    Stand OAL  10x Bilat  10x Bilat  10x Bilat  10x Bilat  10x Bilat    TB MTP and LTP   15x Each Green  15x Each   Green  20x Each   Green  NT    Supine Chin Tucks  10x5" Hold  10x5" Hold  10x5" Hold  10x5" Hold  10x5" Hold    TB bilateral ER and horizontal ABD    Seated 3 way trunk flexion  5x5" Hold Each  10x5" Hold Each  10x5" Hold Each    Bridge and with ABD  2x10 Bridge Only  2x10 Bridge Only  2x10 Bridge Only  2x10 Bridge Only  2x10 Bridge Only    Ther Ex        Nu-step  10 min L1  10 min L1  10 min L1  10 min L1    UBE         Supine SKTC         Caudel glide  5x15" Hold Bilat  5x15" Hold Bilat  5x15" Hold Bilat  5x15" Hold Bilat 5x15" Hold Bilat    Doorway pec stretch     Seated PNF   10x Bilat  With seated trunk rotation   10x Each   Bilat    Cervical ext and rotation with towel support         Supine LTR 10x5" Hold Bilat 10x5" Hold Bilat  10x5" Hold Bilat  10x5" Hold Bilat  10x5" Hold Bilat    HEP update/review  15 min        Ther Activity                        Gait Training                        Modalities        MHP   15 min LB and Bilat UB   15 min LB and Bilat UB  15 min LB and bilat UB

## 2022-10-03 ENCOUNTER — OFFICE VISIT (OUTPATIENT)
Dept: PHYSICAL THERAPY | Facility: CLINIC | Age: 72
End: 2022-10-03
Payer: MEDICARE

## 2022-10-03 DIAGNOSIS — G89.29 CHRONIC LOW BACK PAIN, UNSPECIFIED BACK PAIN LATERALITY, UNSPECIFIED WHETHER SCIATICA PRESENT: ICD-10-CM

## 2022-10-03 DIAGNOSIS — M54.50 CHRONIC LOW BACK PAIN, UNSPECIFIED BACK PAIN LATERALITY, UNSPECIFIED WHETHER SCIATICA PRESENT: ICD-10-CM

## 2022-10-03 DIAGNOSIS — M54.2 CERVICALGIA: Primary | ICD-10-CM

## 2022-10-03 PROCEDURE — 97110 THERAPEUTIC EXERCISES: CPT | Performed by: PHYSICAL THERAPIST

## 2022-10-03 PROCEDURE — 97140 MANUAL THERAPY 1/> REGIONS: CPT | Performed by: PHYSICAL THERAPIST

## 2022-10-03 PROCEDURE — 97112 NEUROMUSCULAR REEDUCATION: CPT | Performed by: PHYSICAL THERAPIST

## 2022-10-10 ENCOUNTER — OFFICE VISIT (OUTPATIENT)
Dept: PHYSICAL THERAPY | Facility: CLINIC | Age: 72
End: 2022-10-10
Payer: MEDICARE

## 2022-10-10 DIAGNOSIS — M54.2 CERVICALGIA: Primary | ICD-10-CM

## 2022-10-10 DIAGNOSIS — M54.50 CHRONIC LOW BACK PAIN, UNSPECIFIED BACK PAIN LATERALITY, UNSPECIFIED WHETHER SCIATICA PRESENT: ICD-10-CM

## 2022-10-10 DIAGNOSIS — G89.29 CHRONIC LOW BACK PAIN, UNSPECIFIED BACK PAIN LATERALITY, UNSPECIFIED WHETHER SCIATICA PRESENT: ICD-10-CM

## 2022-10-10 PROCEDURE — 97140 MANUAL THERAPY 1/> REGIONS: CPT

## 2022-10-10 PROCEDURE — 97110 THERAPEUTIC EXERCISES: CPT

## 2022-10-10 PROCEDURE — 97112 NEUROMUSCULAR REEDUCATION: CPT

## 2022-10-10 NOTE — PROGRESS NOTES
Daily Note     Today's date: 10/10/2022  Patient name: Nicho Erickson  : 1950  MRN: [id number]  Referring provider: Mario Alberto Vu DO  Dx:   Encounter Diagnosis     ICD-10-CM    1  Cervicalgia  M54 2    2  Chronic low back pain, unspecified back pain laterality, unspecified whether sciatica present  M54 50     G89 29         Start Time: 1100  Stop Time: 1200  Total time in clinic (min): 60 minutes    Subjective: Patient reports that he still continues with back pain and headaches from his neck  Objective: See treatment diary below      Assessment: Tolerated treatment well  Gwenevere Reyes participated in skilled PT session focused on strengthening, stretching, and ROM, decreased pain  PT notes continuation of decrease spinal stability with trunk/core weakness with need for continuation of skilled therapy  Patient would continue to benefit from skilled PT interventions to address strengthening, stretching, and ROM  Patient demonstrated fatigue post treatment      Plan: Continue per plan of care        Precautions:  Cervical Fusion      Manuals 10/10 9/22 9/26 9/30 10/3   CROM mobs and stretching with manual cervical traction X 10 min 10 min  10 min  10 min  10 min                            Neuro Re-Ed        Scapular pinch into wall  10 x 5" hold 10x5" Hold  10x5" Hold  HEP     Shoulder wall slides IYTA Seated thoracic extension x 10  Stand lumbar extension against the wall   10x  10x  Seated Thoracic extension   10x    SA rolls     TB Seated resist trunk rotation   10x Bilat Green  NT    Stand OAL   10 x B/L 10x Bilat  10x Bilat  10x Bilat  10x Bilat    TB MTP and LTP  Green x 20 ea  15x Each Green  15x Each   Green  20x Each   Green  NT    Supine Chin Tucks  10 x 5" Hold 10x5" Hold  10x5" Hold  10x5" Hold  10x5" Hold    TB bilateral ER and horizontal ABD    Seated 3 way trunk flexion  5x5" Hold Each  10x5" Hold Each  10x5" Hold Each    Bridge and with ABD  2 x 10 Bridge only  2x10 Bridge Only 2x10 Bridge Only  2x10 Bridge Only  2x10 Bridge Only    Ther Ex        Nu-step  10 min L1  10 min L1  10 min L1  10 min L1    UBE         Supine SKTC         Caudel glide   5 x 15" hold B/L 5x15" Hold Bilat  5x15" Hold Bilat  5x15" Hold Bilat  5x15" Hold Bilat    Doorway pec stretch     Seated PNF   10x Bilat  With seated trunk rotation   10x Each   Bilat    Cervical ext and rotation with towel support         Supine LTR 10 x 5" Hold B/L 10x5" Hold Bilat  10x5" Hold Bilat  10x5" Hold Bilat  10x5" Hold Bilat    HEP update/review         Ther Activity                        Gait Training                        Modalities        MHP  15 min LB and B/L UB 15 min LB and Bilat UB   15 min LB and Bilat UB  15 min LB and bilat UB
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
## 2022-10-14 ENCOUNTER — EVALUATION (OUTPATIENT)
Dept: PHYSICAL THERAPY | Facility: CLINIC | Age: 72
End: 2022-10-14
Payer: MEDICARE

## 2022-10-14 DIAGNOSIS — G89.29 CHRONIC LOW BACK PAIN, UNSPECIFIED BACK PAIN LATERALITY, UNSPECIFIED WHETHER SCIATICA PRESENT: ICD-10-CM

## 2022-10-14 DIAGNOSIS — M54.50 CHRONIC LOW BACK PAIN, UNSPECIFIED BACK PAIN LATERALITY, UNSPECIFIED WHETHER SCIATICA PRESENT: ICD-10-CM

## 2022-10-14 DIAGNOSIS — M54.2 CERVICALGIA: Primary | ICD-10-CM

## 2022-10-14 PROCEDURE — 97140 MANUAL THERAPY 1/> REGIONS: CPT | Performed by: PHYSICAL THERAPIST

## 2022-10-14 PROCEDURE — 97112 NEUROMUSCULAR REEDUCATION: CPT | Performed by: PHYSICAL THERAPIST

## 2022-10-14 PROCEDURE — 97110 THERAPEUTIC EXERCISES: CPT | Performed by: PHYSICAL THERAPIST

## 2022-10-18 NOTE — PROGRESS NOTES
Daily Note     Today's date: 10/19/2022  Patient name: Alexandre Tompkins  : 1950  MRN: [id number]  Referring provider: Dejuan Benjamin DO  Dx:   Encounter Diagnosis     ICD-10-CM    1  Cervicalgia  M54 2    2  Chronic low back pain, unspecified back pain laterality, unspecified whether sciatica present  M54 50     G89 29                   Subjective:  Patient reports the neck is feeling good but overall continuation of mid and low back tightness with continuation of decrease tolerance to standing, walking and lifting  Objective: See treatment diary below      Assessment: Tolerated treatment well  PT notes continuation of poor spinal stability with trunk/core weakness with demonstration of impaired UB posture with need for continuation of skilled therapy  Plan: Continue per plan of care        Precautions:  Cervical Fusion      Manuals 10/10 10/14 10/19     CROM mobs and stretching with manual cervical traction X 10 min 10 min  DC     Thoracic and Lumbar PA mobs    With Bilateral Lumbar rolls   10 min                      Neuro Re-Ed        Scapular pinch into wall  10 x 5" hold DC Wall Thread the Needle   10x5" Hold Bilat      Seated Thoracic Extension  Seated thoracic extension x 10 10x  10x      SA rolls   10x5" Hold  10x5" Hold      Stand OAL   10 x B/L NT      TB MTP and LTP  Green x 20 ea  2x10 Each Green  2x10 Each Blue      Supine Chin Tucks  10 x 5" Hold HEP       Seated 3 way Tball roll outs   10x Each  10x5" Hold Each      Push/pull cart  3x30 Feet  10#  3x30 Feet   10#      Bridge and with ABD  2 x 10 Bridge only  2x15 Bridge Only  2x15 Bridge Only      Ther Ex        Nu-step  10 min L1  DC      UBE         Supine SKTC         Caudel glide   5 x 15" hold B/L 5x15" Hold Bilat  5x15" Hold Bilat      Seated Trunk rotation and PNF  10x Each   Bilat RWB 10x Each      Cervical ext and rotation with towel support         Supine LTR 10 x 5" Hold B/L 10x5" Hold Bilat  10x5" Hold Bilat HEP update/review         Ther Activity                        Gait Training                        Modalities        MHP  15 min LB and B/L UB 15 min LB and Bilat UB  NT

## 2022-10-19 ENCOUNTER — OFFICE VISIT (OUTPATIENT)
Dept: PHYSICAL THERAPY | Facility: CLINIC | Age: 72
End: 2022-10-19
Payer: MEDICARE

## 2022-10-19 DIAGNOSIS — M54.50 CHRONIC LOW BACK PAIN, UNSPECIFIED BACK PAIN LATERALITY, UNSPECIFIED WHETHER SCIATICA PRESENT: ICD-10-CM

## 2022-10-19 DIAGNOSIS — G89.29 CHRONIC LOW BACK PAIN, UNSPECIFIED BACK PAIN LATERALITY, UNSPECIFIED WHETHER SCIATICA PRESENT: ICD-10-CM

## 2022-10-19 DIAGNOSIS — M54.2 CERVICALGIA: Primary | ICD-10-CM

## 2022-10-19 PROCEDURE — 97110 THERAPEUTIC EXERCISES: CPT | Performed by: PHYSICAL THERAPIST

## 2022-10-19 PROCEDURE — 97140 MANUAL THERAPY 1/> REGIONS: CPT | Performed by: PHYSICAL THERAPIST

## 2022-10-19 PROCEDURE — 97112 NEUROMUSCULAR REEDUCATION: CPT | Performed by: PHYSICAL THERAPIST

## 2022-10-20 NOTE — PROGRESS NOTES
Daily Note     Today's date: 10/21/2022  Patient name: Mehrdad Junior  : 1950  MRN: [id number]  Referring provider: Donell Garcia DO  Dx:   Encounter Diagnosis     ICD-10-CM    1  Cervicalgia  M54 2    2  Chronic low back pain, unspecified back pain laterality, unspecified whether sciatica present  M54 50     G89 29                   Subjective:  Patient reports the neck and UB are tight today with increase headache but feels the back is moving slightly better  Patient continues to require multiple hours of rest after any activity  Objective: See treatment diary below      Assessment: Tolerated treatment well  PT notes the patient with continuation of decrease ROM and strength t/o the spine with poor spinal stability with trunk  core weakness with need for continuation of skilled therapy  Plan: Continue per plan of care        Precautions:  Cervical Fusion      Manuals 10/10 10/14 10/19 10/21    CROM mobs and stretching with manual cervical traction X 10 min 10 min  DC     Thoracic and Lumbar PA mobs    With Bilateral Lumbar rolls   10 min  With Bilateral Lumbar Rolls  10 min                     Neuro Re-Ed        Scapular pinch into wall  10 x 5" hold DC Wall Thread the Needle   10x5" Hold Bilat  10x5" Hold Bilat     Seated Thoracic Extension  Seated thoracic extension x 10 10x  10x  10x5" Hold     SA rolls   10x5" Hold  10x5" Hold  10x5" Hold     Stand OAL   10 x B/L NT      TB MTP and LTP  Green x 20 ea  2x10 Each Green  2x10 Each Blue  2x10 Each Blue     Supine Chin Tucks  10 x 5" Hold HEP       Seated 3 way Tball roll outs   10x Each  10x5" Hold Each  10x5" Hold Each     Push/pull cart  3x30 Feet  10#  3x30 Feet   10#  4x30 Feet  15#     Bridge and with ABD  2 x 10 Bridge only  2x15 Bridge Only  2x15 Bridge Only  2x15 Bridge Only     Ther Ex        Nu-step  10 min L1  DC      UBE         Supine SKTC         Caudel glide   5 x 15" hold B/L 5x15" Hold Bilat  5x15" Hold Bilat 5x15" Hold Bilat     Seated Trunk rotation and PNF  10x Each   Bilat RWB 10x Each  10x Each   Bilat RWB     Cervical ext and rotation with towel support         Supine LTR 10 x 5" Hold B/L 10x5" Hold Bilat  10x5" Hold Bilat  10x5" Hold Bilat     HEP update/review         Ther Activity                        Gait Training                        Modalities        MHP  15 min LB and B/L UB 15 min LB and Bilat UB  NT  15 min LB and Bilat UB

## 2022-10-21 ENCOUNTER — OFFICE VISIT (OUTPATIENT)
Dept: PHYSICAL THERAPY | Facility: CLINIC | Age: 72
End: 2022-10-21
Payer: MEDICARE

## 2022-10-21 DIAGNOSIS — M54.2 CERVICALGIA: Primary | ICD-10-CM

## 2022-10-21 DIAGNOSIS — G89.29 CHRONIC LOW BACK PAIN, UNSPECIFIED BACK PAIN LATERALITY, UNSPECIFIED WHETHER SCIATICA PRESENT: ICD-10-CM

## 2022-10-21 DIAGNOSIS — M54.50 CHRONIC LOW BACK PAIN, UNSPECIFIED BACK PAIN LATERALITY, UNSPECIFIED WHETHER SCIATICA PRESENT: ICD-10-CM

## 2022-10-21 PROCEDURE — 97112 NEUROMUSCULAR REEDUCATION: CPT | Performed by: PHYSICAL THERAPIST

## 2022-10-21 PROCEDURE — 97140 MANUAL THERAPY 1/> REGIONS: CPT | Performed by: PHYSICAL THERAPIST

## 2022-10-21 PROCEDURE — 97110 THERAPEUTIC EXERCISES: CPT | Performed by: PHYSICAL THERAPIST

## 2022-10-21 NOTE — PROGRESS NOTES
Daily Note     Today's date: 10/24/2022  Patient name: Essence Read  : 1950  MRN: [id number]  Referring provider: Noemi Gonsales DO  Dx:   Encounter Diagnosis     ICD-10-CM    1  Cervicalgia  M54 2    2  Chronic low back pain, unspecified back pain laterality, unspecified whether sciatica present  M54 50     G89 29                   Subjective:  Patient reports having a rough weekend which continues into this morning with bad headaches with continuation of limitations with all activities with need to lay down and rest the remainder of the day  Objective: See treatment diary below      Assessment: Tolerated treatment fair  PT notes decrease ROM and strength t/o the cervical and lumbar spine with trunk/core weakness with need for continuation of skilled therapy  PT notes modified treatment secondary to patient subjective comments but PT will continue to progress toward therapy goals and full POC as tolerated by patient  Plan: Continue per plan of care        Precautions:  Cervical Fusion      Manuals 10/10 10/14 10/19 10/21 10/24   CROM mobs and stretching with manual cervical traction X 10 min 10 min  DC  10 min    Thoracic and Lumbar PA mobs    With Bilateral Lumbar rolls   10 min  With Bilateral Lumbar Rolls  10 min  With bilateral Lumbar Rolls   10 min                    Neuro Re-Ed        Scapular pinch into wall  10 x 5" hold DC Wall Thread the Needle   10x5" Hold Bilat  10x5" Hold Bilat  NT    Seated Thoracic Extension  Seated thoracic extension x 10 10x  10x  10x5" Hold  NT    SA rolls   10x5" Hold  10x5" Hold  10x5" Hold  10x5" Hold    Stand OAL   10 x B/L NT      TB MTP and LTP  Green x 20 ea  2x10 Each Green  2x10 Each Blue  2x10 Each Blue  NT    Supine Chin Tucks  10 x 5" Hold HEP    Supine Tball ABD crunch   10x3" Hold    Seated 3 way Tball roll outs   10x Each  10x5" Hold Each  10x5" Hold Each  10x5" Hold Each    Push/pull cart  3x30 Feet  10#  3x30 Feet   10#  4x30 Feet  15#  NT    Bridge and with ABD  2 x 10 Bridge only  [address] Only  2x15 Bridge Only  2x15 Bridge Only  2x15 Bridge Only    Ther Ex        Nu-step  10 min L1  DC      UBE         Supine SKTC         Caudel glide   5 x 15" hold B/L 5x15" Hold Bilat  5x15" Hold Bilat  5x15" Hold Bilat  5x15" Hold Bilat    Seated Trunk rotation and PNF  10x Each   Bilat RWB 10x Each  10x Each   Bilat RWB  10x Each Bilat RFB    Cervical ext and rotation with towel support         Supine LTR 10 x 5" Hold B/L 10x5" Hold Bilat  10x5" Hold Bilat  10x5" Hold Bilat  10x5" Hold Bilat    HEP update/review         Ther Activity                        Gait Training                        Modalities        MHP  15 min LB and B/L UB 15 min LB and Bilat UB  NT  15 min LB and Bilat UB  15 min LB and Bilat UB

## 2022-10-24 ENCOUNTER — OFFICE VISIT (OUTPATIENT)
Dept: PHYSICAL THERAPY | Facility: CLINIC | Age: 72
End: 2022-10-24
Payer: MEDICARE

## 2022-10-24 DIAGNOSIS — M54.2 CERVICALGIA: Primary | ICD-10-CM

## 2022-10-24 DIAGNOSIS — G89.29 CHRONIC LOW BACK PAIN, UNSPECIFIED BACK PAIN LATERALITY, UNSPECIFIED WHETHER SCIATICA PRESENT: ICD-10-CM

## 2022-10-24 DIAGNOSIS — M54.50 CHRONIC LOW BACK PAIN, UNSPECIFIED BACK PAIN LATERALITY, UNSPECIFIED WHETHER SCIATICA PRESENT: ICD-10-CM

## 2022-10-24 PROCEDURE — 97140 MANUAL THERAPY 1/> REGIONS: CPT | Performed by: PHYSICAL THERAPIST

## 2022-10-24 PROCEDURE — 97110 THERAPEUTIC EXERCISES: CPT | Performed by: PHYSICAL THERAPIST

## 2022-10-24 PROCEDURE — 97112 NEUROMUSCULAR REEDUCATION: CPT | Performed by: PHYSICAL THERAPIST

## 2022-10-27 NOTE — PROGRESS NOTES
Daily Note     Today's date: 10/28/2022  Patient name: Jony Franco  : 1950  MRN: [id number]  Referring provider: Keiko Macias DO  Dx:   Encounter Diagnosis     ICD-10-CM    1  Cervicalgia  M54 2    2  Chronic low back pain, unspecified back pain laterality, unspecified whether sciatica present  M54 50     G89 29                   Subjective:  Patient reports continuation of limitations with all activities with need to lay down and rest for several hours after the activity  Objective: See treatment diary below      Assessment: Tolerated treatment well  PT notes continuation of decrease ROM and strength t/o the cervical and lumbar spine with trunk/core weakness and poor spinal stability with need for continuation of skilled therapy  Plan: Continue per plan of care        Precautions:  Cervical Fusion      Manuals 10/14 10/19 10/21 10/24 10/28   CROM mobs and stretching with manual cervical traction 10 min  DC  10 min  10 min    Thoracic and Lumbar PA mobs   With Bilateral Lumbar rolls   10 min  With Bilateral Lumbar Rolls  10 min  With bilateral Lumbar Rolls   10 min  10 min with Lumbar Rolls                    Neuro Re-Ed        Wall Thread the Needle  DC Wall Thread the Needle   10x5" Hold Bilat  10x5" Hold Bilat  NT  10x5" Hold Bilat    Seated Thoracic Extension  10x  10x  10x5" Hold  NT  NT   SA rolls  10x5" Hold  10x5" Hold  10x5" Hold  10x5" Hold  10x5" Hold    Stand OAL  NT       TB MTP and LTP  2x10 Each Green  2x10 Each Blue  2x10 Each Blue  NT  KIANNA  1 min    Supine Chin Tucks  HEP    Supine Tball ABD crunch   10x3" Hold  15x3" Hold    Seated 3 way Tball roll outs  10x Each  10x5" Hold Each  10x5" Hold Each  10x5" Hold Each  10x5" Hold Each    Push/pull cart 3x30 Feet  10#  3x30 Feet   10#  4x30 Feet  15#  NT     Bridge and with ABD  [address] Only  [address] Only  2x15 Bridge Only  2x15 Bridge Only  2x15 Bridge Only    Ther Ex        Nu-step  DC       UBE Supine SKTC         Caudel glide  5x15" Hold Bilat  5x15" Hold Bilat  5x15" Hold Bilat  5x15" Hold Bilat  5x15" Hold Bilat    Seated Trunk rotation and PNF 10x Each   Bilat RWB 10x Each  10x Each   Bilat RWB  10x Each Bilat RFB  15x Each Bilat RWB    Cervical ext and rotation with towel support         Supine LTR 10x5" Hold Bilat  10x5" Hold Bilat  10x5" Hold Bilat  10x5" Hold Bilat  10x5" Hold Bilat    HEP update/review         Ther Activity                        Gait Training                        Modalities        MHP  15 min LB and Bilat UB  NT  15 min LB and Bilat UB  15 min LB and Bilat UB  15 min LB and Bilat UB

## 2022-10-28 ENCOUNTER — OFFICE VISIT (OUTPATIENT)
Dept: PHYSICAL THERAPY | Facility: CLINIC | Age: 72
End: 2022-10-28
Payer: MEDICARE

## 2022-10-28 DIAGNOSIS — M54.2 CERVICALGIA: Primary | ICD-10-CM

## 2022-10-28 DIAGNOSIS — M54.50 CHRONIC LOW BACK PAIN, UNSPECIFIED BACK PAIN LATERALITY, UNSPECIFIED WHETHER SCIATICA PRESENT: ICD-10-CM

## 2022-10-28 DIAGNOSIS — G89.29 CHRONIC LOW BACK PAIN, UNSPECIFIED BACK PAIN LATERALITY, UNSPECIFIED WHETHER SCIATICA PRESENT: ICD-10-CM

## 2022-10-28 PROCEDURE — 97112 NEUROMUSCULAR REEDUCATION: CPT | Performed by: PHYSICAL THERAPIST

## 2022-10-28 PROCEDURE — 97140 MANUAL THERAPY 1/> REGIONS: CPT | Performed by: PHYSICAL THERAPIST

## 2022-10-28 PROCEDURE — 97110 THERAPEUTIC EXERCISES: CPT | Performed by: PHYSICAL THERAPIST

## 2022-11-01 NOTE — PROGRESS NOTES
Daily Note     Today's date: 2022  Patient name: Niki Dorsey  : 1950  MRN: [id number]  Referring provider: Olga Busby DO  Dx:   Encounter Diagnosis     ICD-10-CM    1  Cervicalgia  M54 2    2  Chronic low back pain, unspecified back pain laterality, unspecified whether sciatica present  M54 50     G89 29                   Subjective:  Patient reports having a good couple of days with overall decrease pain levels with slight increase in tolerance to standing and lifting activities  Objective: See treatment diary below      Assessment: Tolerated treatment well  PT notes continuation of decrease ROM and strength t/o the spinal column with impaired stability with trunk/core weakness with need for continuation of skilled therapy  Plan: Continue per plan of care        Precautions:  Cervical Fusion      Manuals 10/19 10/21 10/24 10/28 11/2   CROM mobs and stretching with manual cervical traction DC  10 min  10 min  5 min    Thoracic and Lumbar PA mobs  With Bilateral Lumbar rolls   10 min  With Bilateral Lumbar Rolls  10 min  With bilateral Lumbar Rolls   10 min  10 min with Lumbar Rolls  10 min with Lumbar rolls                    Neuro Re-Ed        Wall Thread the Needle  Wall Thread the Needle   10x5" Hold Bilat  10x5" Hold Bilat  NT  10x5" Hold Bilat  10x5" Hold Bilat    Seated Thoracic Extension  10x  10x5" Hold  NT  NT NT    SA rolls  10x5" Hold  10x5" Hold  10x5" Hold  10x5" Hold  10x5" Hold    Stand OAL      Prone scap stab IYT 10x Each    TB MTP and LTP  2x10 Each Blue  2x10 Each Blue  NT  KIANNA  1 min  1 min    Supine Chin Tucks    Supine Tball ABD crunch   10x3" Hold  15x3" Hold  10x3" Hold    Seated 3 way Tball roll outs  10x5" Hold Each  10x5" Hold Each  10x5" Hold Each  10x5" Hold Each  10x5" Hold Each    Push/pull cart 3x30 Feet   10#  4x30 Feet  15#  NT      Bridge and with ABD  [address] Only  [address] Only  [address] Only  [address] Only  [address] [facility] Only    Ther Ex        Nu-step         UBE         Supine SKTC         Caudel glide  5x15" Hold Bilat  5x15" Hold Bilat  5x15" Hold Bilat  5x15" Hold Bilat  5x15" Hold Bilat    Seated Trunk rotation and PNF 10x Each  10x Each   Bilat RWB  10x Each Bilat RFB  15x Each Bilat RWB  NT   Cervical ext and rotation with towel support         Supine LTR 10x5" Hold Bilat  10x5" Hold Bilat  10x5" Hold Bilat  10x5" Hold Bilat  10x5" Hold Bilat    HEP update/review         Ther Activity                        Gait Training                        Modalities        MHP  NT  15 min LB and Bilat UB  15 min LB and Bilat UB  15 min LB and Bilat UB  15 min LB and Bilat UB

## 2022-11-02 ENCOUNTER — OFFICE VISIT (OUTPATIENT)
Dept: PHYSICAL THERAPY | Facility: CLINIC | Age: 72
End: 2022-11-02

## 2022-11-02 DIAGNOSIS — G89.29 CHRONIC LOW BACK PAIN, UNSPECIFIED BACK PAIN LATERALITY, UNSPECIFIED WHETHER SCIATICA PRESENT: ICD-10-CM

## 2022-11-02 DIAGNOSIS — M54.2 CERVICALGIA: Primary | ICD-10-CM

## 2022-11-02 DIAGNOSIS — M54.50 CHRONIC LOW BACK PAIN, UNSPECIFIED BACK PAIN LATERALITY, UNSPECIFIED WHETHER SCIATICA PRESENT: ICD-10-CM

## 2022-11-03 NOTE — PROGRESS NOTES
Daily Note     Today's date: 2022  Patient name: Piyush Kidd  : 1950  MRN: [id number]  Referring provider: Nadya Ware DO  Dx:   Encounter Diagnosis     ICD-10-CM    1  Cervicalgia  M54 2    2  Chronic low back pain, unspecified back pain laterality, unspecified whether sciatica present  M54 50     G89 29                   Subjective:  Patient reports the neck and LB are stiff and tight this morning with continuation of difficulty with all ADL, lifting, carrying and standing with time restrictions and need to rest for several hours after any activity  Objective: See treatment diary below      Assessment: Tolerated treatment well  PT notes continuation of decrease spinal stability with decrease ROM and strength t/o the lumbar and cervical spine with trunk/core weakness with need for continuation of skilled therapy  Plan: Continue per plan of care        Precautions:  Cervical Fusion      Manuals 10/21 10/24 10/28 11/2 11/4   CROM mobs and stretching with manual cervical traction  10 min  10 min  5 min  10 min    Thoracic and Lumbar PA mobs  With Bilateral Lumbar Rolls  10 min  With bilateral Lumbar Rolls   10 min  10 min with Lumbar Rolls  10 min with Lumbar rolls  10 min with Lumbar Rolls                    Neuro Re-Ed        Wall Thread the Needle  10x5" Hold Bilat  NT  10x5" Hold Bilat  10x5" Hold Bilat  NT    Seated Thoracic Extension  10x5" Hold  NT  NT NT  Paloff Punch    SA rolls  10x5" Hold  10x5" Hold  10x5" Hold  10x5" Hold  10x5" Hold    Stand OAL     Prone scap stab IYT 10x Each  IYT   10x Each    TB MTP and LTP  2x10 Each Blue  NT  KIANNA  1 min  1 min  2 min    Supine Chin Tucks   Supine Tball ABD crunch   10x3" Hold  15x3" Hold  10x3" Hold  2x10    Seated 3 way Tball roll outs  10x5" Hold Each  10x5" Hold Each  10x5" Hold Each  10x5" Hold Each  10x5" Hold    Push/pull cart 4x30 Feet  15#  NT       Bridge and with ABD  2x15 Bridge Only  2x15 Bridge Only  2x15 Bridge Only  2x15 Bridge Only  2x15 Bridge Only    Ther Ex        Nu-step         UBE         Supine SKTC         Caudel glide  5x15" Hold Bilat  5x15" Hold Bilat  5x15" Hold Bilat  5x15" Hold Bilat  5x15" Hold    Seated Trunk rotation and PNF 10x Each   Bilat RWB  10x Each Bilat RFB  15x Each Bilat RWB  NT NT    Cervical ext and rotation with towel support         Supine LTR 10x5" Hold Bilat  10x5" Hold Bilat  10x5" Hold Bilat  10x5" Hold Bilat  10x5" Hold Bilat    HEP update/review         Ther Activity                        Gait Training                        Modalities        MHP  15 min LB and Bilat UB  15 min LB and Bilat UB  15 min LB and Bilat UB  15 min LB and Bilat UB  15 min LB and Bilat UB

## 2022-11-04 ENCOUNTER — OFFICE VISIT (OUTPATIENT)
Dept: PHYSICAL THERAPY | Facility: CLINIC | Age: 72
End: 2022-11-04

## 2022-11-04 DIAGNOSIS — G89.29 CHRONIC LOW BACK PAIN, UNSPECIFIED BACK PAIN LATERALITY, UNSPECIFIED WHETHER SCIATICA PRESENT: ICD-10-CM

## 2022-11-04 DIAGNOSIS — M54.50 CHRONIC LOW BACK PAIN, UNSPECIFIED BACK PAIN LATERALITY, UNSPECIFIED WHETHER SCIATICA PRESENT: ICD-10-CM

## 2022-11-04 DIAGNOSIS — M54.2 CERVICALGIA: Primary | ICD-10-CM

## 2022-11-09 ENCOUNTER — OFFICE VISIT (OUTPATIENT)
Dept: PHYSICAL THERAPY | Facility: CLINIC | Age: 72
End: 2022-11-09

## 2022-11-09 DIAGNOSIS — M54.50 CHRONIC LOW BACK PAIN, UNSPECIFIED BACK PAIN LATERALITY, UNSPECIFIED WHETHER SCIATICA PRESENT: ICD-10-CM

## 2022-11-09 DIAGNOSIS — M54.2 CERVICALGIA: Primary | ICD-10-CM

## 2022-11-09 DIAGNOSIS — G89.29 CHRONIC LOW BACK PAIN, UNSPECIFIED BACK PAIN LATERALITY, UNSPECIFIED WHETHER SCIATICA PRESENT: ICD-10-CM

## 2022-11-09 NOTE — PROGRESS NOTES
Daily Note     Today's date: 2022  Patient name: Lisandra Correa  : 1950  MRN: [id number]  Referring provider: Jyothi Price DO  Dx:   Encounter Diagnosis     ICD-10-CM    1  Cervicalgia  M54 2    2  Chronic low back pain, unspecified back pain laterality, unspecified whether sciatica present  M54 50     G89 29                   Subjective: "I'm ready to be parallel, I've been on my feet for at least 5 hours  I can be up on my feet moving around and then as soon as I stand still to cook in the kitchen I have to go and sit down, the pain just grabs me "  Patient reports that he felt a headache coming on earlier today, but then it never did  Patient reports that he was pleased with his PT session today and felt some relief post therapeutic exercise program and manual treatments  Objective: See treatment diary below      Assessment: Tolerated treatment well  Patient exhibited good technique with therapeutic exercises and would benefit from continued PT to increase ROM/strength and endurance to improve mobility  Plan: Continue per plan of care        Precautions:  Cervical Fusion      Manuals 10/24 10/28 11/2 11/4 11/9   CROM mobs and stretching with manual cervical traction 10 min  10 min  5 min  10 min  10'   Thoracic and Lumbar PA mobs  With bilateral Lumbar Rolls   10 min  10 min with Lumbar Rolls  10 min with Lumbar rolls  10 min with Lumbar Rolls  5'                   Neuro Re-Ed        Wall Thread the Needle  NT  10x5" Hold Bilat  10x5" Hold Bilat  NT     Seated Thoracic Extension  NT  NT NT  Paloff Punch     SA rolls  10x5" Hold  10x5" Hold  10x5" Hold  10x5" Hold  10x5"   Stand OAL    Prone scap stab IYT 10x Each  IYT   10x Each  IYT 10x ea   TB MTP and LTP  NT  KIANNA  1 min  1 min  2 min  2'   Supine Chin Tucks  Supine Tball ABD crunch   10x3" Hold  15x3" Hold  10x3" Hold  2x10  2x10   Seated 3 way Tball roll outs  10x5" Hold Each  10x5" Hold Each  10x5" Hold Each  10x5" Hold  10x5"   Push/pull cart NT        Bridge and with ABD  2x15 Bridge Only  2x15 Bridge Only  2x15 Bridge Only  2x15 Bridge Only  2x15 Bridge only   Ther Ex     11/9   Nu-step         UBE         Supine SKTC         Caudel glide  5x15" Hold Bilat  5x15" Hold Bilat  5x15" Hold Bilat  5x15" Hold  5x15"   Seated Trunk rotation and PNF 10x Each Bilat RFB  15x Each Bilat RWB  NT NT     Cervical ext and rotation with towel support         Supine LTR 10x5" Hold Bilat  10x5" Hold Bilat  10x5" Hold Bilat  10x5" Hold Bilat  10x5"bilat   HEP update/review         Ther Activity     11/9                   Gait Training     11/9                   Modalities     11/9   MHP  15 min LB and Bilat UB  15 min LB and Bilat UB  15 min LB and Bilat UB  15 min LB and Bilat UB  15' LB & bilat UB
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
## 2022-11-11 ENCOUNTER — OFFICE VISIT (OUTPATIENT)
Dept: PHYSICAL THERAPY | Facility: CLINIC | Age: 72
End: 2022-11-11

## 2022-11-11 DIAGNOSIS — M54.50 CHRONIC LOW BACK PAIN, UNSPECIFIED BACK PAIN LATERALITY, UNSPECIFIED WHETHER SCIATICA PRESENT: ICD-10-CM

## 2022-11-11 DIAGNOSIS — G89.29 CHRONIC LOW BACK PAIN, UNSPECIFIED BACK PAIN LATERALITY, UNSPECIFIED WHETHER SCIATICA PRESENT: ICD-10-CM

## 2022-11-11 DIAGNOSIS — M54.2 CERVICALGIA: Primary | ICD-10-CM
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
## 2023-09-06 ENCOUNTER — HOSPITAL ENCOUNTER (INPATIENT)
Facility: HOSPITAL | Age: 73
LOS: 2 days | Discharge: HOME | DRG: 176 | End: 2023-09-08
Attending: EMERGENCY MEDICINE | Admitting: STUDENT IN AN ORGANIZED HEALTH CARE EDUCATION/TRAINING PROGRAM
Payer: MEDICARE

## 2023-09-06 ENCOUNTER — APPOINTMENT (EMERGENCY)
Dept: CT IMAGING | Facility: HOSPITAL | Age: 73
DRG: 176 | End: 2023-09-06
Payer: MEDICARE

## 2023-09-06 ENCOUNTER — APPOINTMENT (EMERGENCY)
Dept: RADIOLOGY | Facility: HOSPITAL | Age: 73
DRG: 176 | End: 2023-09-06
Payer: MEDICARE

## 2023-09-06 DIAGNOSIS — I26.99 PULMONARY EMBOLISM (HCC): Primary | ICD-10-CM

## 2023-09-06 DIAGNOSIS — I26.99 PULMONARY INFARCT (HCC): ICD-10-CM

## 2023-09-06 PROBLEM — M43.22 FUSION OF SPINE, CERVICAL REGION: Status: ACTIVE | Noted: 2019-07-20

## 2023-09-06 PROBLEM — N28.9 RENAL LESION: Status: ACTIVE | Noted: 2023-09-06

## 2023-09-06 PROBLEM — D64.9 ANEMIA: Status: ACTIVE | Noted: 2018-01-22

## 2023-09-06 PROBLEM — M19.90 UNSPECIFIED OSTEOARTHRITIS, UNSPECIFIED SITE: Status: ACTIVE | Noted: 2019-07-20

## 2023-09-06 PROBLEM — Z89.521: Status: ACTIVE | Noted: 2019-07-20

## 2023-09-06 PROBLEM — E66.9 OBESITY WITH BODY MASS INDEX 30 OR GREATER: Status: ACTIVE | Noted: 2019-03-05

## 2023-09-06 PROBLEM — R26.2 DIFFICULTY IN WALKING, NOT ELSEWHERE CLASSIFIED: Status: ACTIVE | Noted: 2019-07-20

## 2023-09-06 PROBLEM — K59.00 CONSTIPATION, UNSPECIFIED: Status: ACTIVE | Noted: 2019-07-20

## 2023-09-06 PROBLEM — M54.9 BACK PAIN: Status: ACTIVE | Noted: 2023-09-06

## 2023-09-06 PROBLEM — G47.30 SLEEP APNEA: Status: ACTIVE | Noted: 2018-01-22

## 2023-09-06 LAB
ALBUMIN SERPL BCP-MCNC: 4.2 G/DL (ref 3.5–5)
ALP SERPL-CCNC: 54 U/L (ref 34–104)
ALT SERPL W P-5'-P-CCNC: 22 U/L (ref 7–52)
ANION GAP SERPL CALCULATED.3IONS-SCNC: 8 MMOL/L
APTT PPP: 27 SECONDS (ref 23–37)
AST SERPL W P-5'-P-CCNC: 14 U/L (ref 13–39)
BACTERIA UR QL AUTO: ABNORMAL /HPF
BASOPHILS # BLD AUTO: 0.04 THOUSANDS/ÂΜL (ref 0–0.1)
BASOPHILS NFR BLD AUTO: 0 % (ref 0–1)
BILIRUB SERPL-MCNC: 0.84 MG/DL (ref 0.2–1)
BILIRUB UR QL STRIP: ABNORMAL
BNP SERPL-MCNC: 45 PG/ML (ref 0–100)
BUN SERPL-MCNC: 18 MG/DL (ref 5–25)
CALCIUM SERPL-MCNC: 9.7 MG/DL (ref 8.4–10.2)
CAOX CRY URNS QL MICRO: ABNORMAL /HPF
CARDIAC TROPONIN I PNL SERPL HS: 4 NG/L
CHLORIDE SERPL-SCNC: 106 MMOL/L (ref 96–108)
CLARITY UR: CLEAR
CO2 SERPL-SCNC: 28 MMOL/L (ref 21–32)
COLOR UR: YELLOW
CREAT SERPL-MCNC: 0.96 MG/DL (ref 0.6–1.3)
EOSINOPHIL # BLD AUTO: 0 THOUSAND/ÂΜL (ref 0–0.61)
EOSINOPHIL NFR BLD AUTO: 0 % (ref 0–6)
ERYTHROCYTE [DISTWIDTH] IN BLOOD BY AUTOMATED COUNT: 13.5 % (ref 11.6–15.1)
GFR SERPL CREATININE-BSD FRML MDRD: 78 ML/MIN/1.73SQ M
GLUCOSE SERPL-MCNC: 90 MG/DL (ref 65–140)
GLUCOSE UR STRIP-MCNC: ABNORMAL MG/DL
HCT VFR BLD AUTO: 47.4 % (ref 36.5–49.3)
HGB BLD-MCNC: 15.3 G/DL (ref 12–17)
HGB UR QL STRIP.AUTO: ABNORMAL
IMM GRANULOCYTES # BLD AUTO: 0.05 THOUSAND/UL (ref 0–0.2)
IMM GRANULOCYTES NFR BLD AUTO: 0 % (ref 0–2)
INR PPP: 1 (ref 0.84–1.19)
KETONES UR STRIP-MCNC: NEGATIVE MG/DL
LACTATE SERPL-SCNC: 1.7 MMOL/L (ref 0.5–2)
LEUKOCYTE ESTERASE UR QL STRIP: NEGATIVE
LIPASE SERPL-CCNC: 32 U/L (ref 11–82)
LYMPHOCYTES # BLD AUTO: 1.43 THOUSANDS/ÂΜL (ref 0.6–4.47)
LYMPHOCYTES NFR BLD AUTO: 13 % (ref 14–44)
MCH RBC QN AUTO: 31 PG (ref 26.8–34.3)
MCHC RBC AUTO-ENTMCNC: 32.3 G/DL (ref 31.4–37.4)
MCV RBC AUTO: 96 FL (ref 82–98)
MONOCYTES # BLD AUTO: 1.12 THOUSAND/ÂΜL (ref 0.17–1.22)
MONOCYTES NFR BLD AUTO: 10 % (ref 4–12)
NEUTROPHILS # BLD AUTO: 8.64 THOUSANDS/ÂΜL (ref 1.85–7.62)
NEUTS SEG NFR BLD AUTO: 77 % (ref 43–75)
NITRITE UR QL STRIP: NEGATIVE
NON-SQ EPI CELLS URNS QL MICRO: ABNORMAL /HPF
NRBC BLD AUTO-RTO: 0 /100 WBCS
PH UR STRIP.AUTO: 5 [PH]
PLATELET # BLD AUTO: 195 THOUSANDS/UL (ref 149–390)
PMV BLD AUTO: 9.5 FL (ref 8.9–12.7)
POTASSIUM SERPL-SCNC: 4.4 MMOL/L (ref 3.5–5.3)
PROT SERPL-MCNC: 7.2 G/DL (ref 6.4–8.4)
PROT UR STRIP-MCNC: ABNORMAL MG/DL
PROTHROMBIN TIME: 13.5 SECONDS (ref 11.6–14.5)
RBC # BLD AUTO: 4.94 MILLION/UL (ref 3.88–5.62)
RBC #/AREA URNS AUTO: ABNORMAL /HPF
SODIUM SERPL-SCNC: 142 MMOL/L (ref 135–147)
SP GR UR STRIP.AUTO: >=1.03 (ref 1–1.03)
UROBILINOGEN UR QL STRIP.AUTO: 1 E.U./DL
WBC # BLD AUTO: 11.28 THOUSAND/UL (ref 4.31–10.16)
WBC #/AREA URNS AUTO: ABNORMAL /HPF

## 2023-09-06 PROCEDURE — 96361 HYDRATE IV INFUSION ADD-ON: CPT

## 2023-09-06 PROCEDURE — 96374 THER/PROPH/DIAG INJ IV PUSH: CPT

## 2023-09-06 PROCEDURE — 71045 X-RAY EXAM CHEST 1 VIEW: CPT

## 2023-09-06 PROCEDURE — 81001 URINALYSIS AUTO W/SCOPE: CPT | Performed by: EMERGENCY MEDICINE

## 2023-09-06 PROCEDURE — 85610 PROTHROMBIN TIME: CPT | Performed by: EMERGENCY MEDICINE

## 2023-09-06 PROCEDURE — 96375 TX/PRO/DX INJ NEW DRUG ADDON: CPT

## 2023-09-06 PROCEDURE — 84484 ASSAY OF TROPONIN QUANT: CPT | Performed by: EMERGENCY MEDICINE

## 2023-09-06 PROCEDURE — 80053 COMPREHEN METABOLIC PANEL: CPT | Performed by: EMERGENCY MEDICINE

## 2023-09-06 PROCEDURE — 83880 ASSAY OF NATRIURETIC PEPTIDE: CPT | Performed by: PHYSICIAN ASSISTANT

## 2023-09-06 PROCEDURE — G1004 CDSM NDSC: HCPCS

## 2023-09-06 PROCEDURE — 99223 1ST HOSP IP/OBS HIGH 75: CPT | Performed by: STUDENT IN AN ORGANIZED HEALTH CARE EDUCATION/TRAINING PROGRAM

## 2023-09-06 PROCEDURE — 85025 COMPLETE CBC W/AUTO DIFF WBC: CPT | Performed by: EMERGENCY MEDICINE

## 2023-09-06 PROCEDURE — 93005 ELECTROCARDIOGRAM TRACING: CPT

## 2023-09-06 PROCEDURE — 83605 ASSAY OF LACTIC ACID: CPT | Performed by: EMERGENCY MEDICINE

## 2023-09-06 PROCEDURE — 99284 EMERGENCY DEPT VISIT MOD MDM: CPT

## 2023-09-06 PROCEDURE — 36415 COLL VENOUS BLD VENIPUNCTURE: CPT | Performed by: EMERGENCY MEDICINE

## 2023-09-06 PROCEDURE — 74177 CT ABD & PELVIS W/CONTRAST: CPT

## 2023-09-06 PROCEDURE — 85730 THROMBOPLASTIN TIME PARTIAL: CPT | Performed by: EMERGENCY MEDICINE

## 2023-09-06 PROCEDURE — 99285 EMERGENCY DEPT VISIT HI MDM: CPT | Performed by: EMERGENCY MEDICINE

## 2023-09-06 PROCEDURE — 83690 ASSAY OF LIPASE: CPT | Performed by: EMERGENCY MEDICINE

## 2023-09-06 PROCEDURE — 71275 CT ANGIOGRAPHY CHEST: CPT

## 2023-09-06 RX ORDER — HEPARIN SODIUM 1000 [USP'U]/ML
8000 INJECTION, SOLUTION INTRAVENOUS; SUBCUTANEOUS EVERY 6 HOURS PRN
Status: DISCONTINUED | OUTPATIENT
Start: 2023-09-06 | End: 2023-09-07

## 2023-09-06 RX ORDER — HEPARIN SODIUM 10000 [USP'U]/100ML
3-30 INJECTION, SOLUTION INTRAVENOUS
Status: DISCONTINUED | OUTPATIENT
Start: 2023-09-06 | End: 2023-09-07

## 2023-09-06 RX ORDER — DIAZEPAM 5 MG/ML
5 INJECTION, SOLUTION INTRAMUSCULAR; INTRAVENOUS ONCE
Status: COMPLETED | OUTPATIENT
Start: 2023-09-06 | End: 2023-09-06

## 2023-09-06 RX ORDER — HEPARIN SODIUM 1000 [USP'U]/ML
4000 INJECTION, SOLUTION INTRAVENOUS; SUBCUTANEOUS EVERY 6 HOURS PRN
Status: DISCONTINUED | OUTPATIENT
Start: 2023-09-06 | End: 2023-09-07

## 2023-09-06 RX ORDER — KETOROLAC TROMETHAMINE 30 MG/ML
15 INJECTION, SOLUTION INTRAMUSCULAR; INTRAVENOUS ONCE
Status: COMPLETED | OUTPATIENT
Start: 2023-09-06 | End: 2023-09-06

## 2023-09-06 RX ORDER — HEPARIN SODIUM 1000 [USP'U]/ML
8000 INJECTION, SOLUTION INTRAVENOUS; SUBCUTANEOUS ONCE
Status: COMPLETED | OUTPATIENT
Start: 2023-09-06 | End: 2023-09-06

## 2023-09-06 RX ORDER — CHLORHEXIDINE GLUCONATE ORAL RINSE 1.2 MG/ML
15 SOLUTION DENTAL EVERY 12 HOURS SCHEDULED
Status: DISCONTINUED | OUTPATIENT
Start: 2023-09-06 | End: 2023-09-08 | Stop reason: HOSPADM

## 2023-09-06 RX ADMIN — DIAZEPAM 5 MG: 10 INJECTION, SOLUTION INTRAMUSCULAR; INTRAVENOUS at 19:59

## 2023-09-06 RX ADMIN — SODIUM CHLORIDE 1000 ML: 0.9 INJECTION, SOLUTION INTRAVENOUS at 20:02

## 2023-09-06 RX ADMIN — HEPARIN SODIUM 18 UNITS/KG/HR: 10000 INJECTION, SOLUTION INTRAVENOUS at 22:20

## 2023-09-06 RX ADMIN — IOHEXOL 100 ML: 350 INJECTION, SOLUTION INTRAVENOUS at 20:29

## 2023-09-06 RX ADMIN — HEPARIN SODIUM 8000 UNITS: 1000 INJECTION INTRAVENOUS; SUBCUTANEOUS at 22:14

## 2023-09-06 RX ADMIN — KETOROLAC TROMETHAMINE 15 MG: 30 INJECTION, SOLUTION INTRAMUSCULAR; INTRAVENOUS at 19:58
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
## 2023-09-07 ENCOUNTER — APPOINTMENT (INPATIENT)
Dept: CT IMAGING | Facility: HOSPITAL | Age: 73
DRG: 176 | End: 2023-09-07
Payer: MEDICARE

## 2023-09-07 ENCOUNTER — APPOINTMENT (INPATIENT)
Dept: NON INVASIVE DIAGNOSTICS | Facility: HOSPITAL | Age: 73
DRG: 176 | End: 2023-09-07
Payer: MEDICARE

## 2023-09-07 LAB
ANION GAP SERPL CALCULATED.3IONS-SCNC: 5 MMOL/L
AORTIC ROOT: 3.8 CM
AORTIC VALVE MEAN VELOCITY: 7.6 M/S
APICAL FOUR CHAMBER EJECTION FRACTION: 60 %
APTT PPP: 184 SECONDS (ref 23–37)
APTT PPP: >210 SECONDS (ref 23–37)
ASCENDING AORTA: 3.3 CM
ATRIAL RATE: 92 BPM
AV AREA BY CONTINUOUS VTI: 5.4 CM2
AV AREA PEAK VELOCITY: 5 CM2
AV LVOT MEAN GRADIENT: 2 MMHG
AV LVOT PEAK GRADIENT: 5 MMHG
AV MEAN GRADIENT: 3 MMHG
AV PEAK GRADIENT: 6 MMHG
AV VALVE AREA: 5.42 CM2
AV VELOCITY RATIO: 0.95
BUN SERPL-MCNC: 20 MG/DL (ref 5–25)
CALCIUM SERPL-MCNC: 8.6 MG/DL (ref 8.4–10.2)
CHLORIDE SERPL-SCNC: 110 MMOL/L (ref 96–108)
CO2 SERPL-SCNC: 25 MMOL/L (ref 21–32)
CREAT SERPL-MCNC: 0.89 MG/DL (ref 0.6–1.3)
DOP CALC AO PEAK VEL: 1.18 M/S
DOP CALC AO VTI: 25.88 CM
DOP CALC LVOT AREA: 5.31 CM2
DOP CALC LVOT CARDIAC INDEX: 5.62 L/MIN/M2
DOP CALC LVOT CARDIAC OUTPUT: 11.74 L/MIN
DOP CALC LVOT DIAMETER: 2.6 CM
DOP CALC LVOT PEAK VEL VTI: 26.43 CM
DOP CALC LVOT PEAK VEL: 1.12 M/S
DOP CALC LVOT STROKE INDEX: 67.5 ML/M2
DOP CALC LVOT STROKE VOLUME: 140.25 CM3
E WAVE DECELERATION TIME: 195 MS
ERYTHROCYTE [DISTWIDTH] IN BLOOD BY AUTOMATED COUNT: 13.7 % (ref 11.6–15.1)
FRACTIONAL SHORTENING: 27 % (ref 28–44)
GFR SERPL CREATININE-BSD FRML MDRD: 85 ML/MIN/1.73SQ M
GLUCOSE SERPL-MCNC: 110 MG/DL (ref 65–140)
HCT VFR BLD AUTO: 38.9 % (ref 36.5–49.3)
HGB BLD-MCNC: 12.9 G/DL (ref 12–17)
INTERVENTRICULAR SEPTUM IN DIASTOLE (PARASTERNAL SHORT AXIS VIEW): 1.3 CM
INTERVENTRICULAR SEPTUM: 1.3 CM (ref 0.6–1.1)
LAAS-AP2: 15 CM2
LAAS-AP4: 14.4 CM2
LEFT ATRIUM SIZE: 3.2 CM
LEFT ATRIUM VOLUME (MOD BIPLANE): 34 ML
LEFT INTERNAL DIMENSION IN SYSTOLE: 3.3 CM (ref 2.1–4)
LEFT VENTRICLE DIASTOLIC VOLUME (MOD BIPLANE): 73 ML
LEFT VENTRICLE SYSTOLIC VOLUME (MOD BIPLANE): 23 ML
LEFT VENTRICULAR INTERNAL DIMENSION IN DIASTOLE: 4.5 CM (ref 3.5–6)
LEFT VENTRICULAR POSTERIOR WALL IN END DIASTOLE: 1.3 CM
LEFT VENTRICULAR STROKE VOLUME: 51 ML
LV EF: 69 %
LVSV (TEICH): 51 ML
MAGNESIUM SERPL-MCNC: 2 MG/DL (ref 1.9–2.7)
MCH RBC QN AUTO: 31.9 PG (ref 26.8–34.3)
MCHC RBC AUTO-ENTMCNC: 33.2 G/DL (ref 31.4–37.4)
MCV RBC AUTO: 96 FL (ref 82–98)
MV E'TISSUE VEL-LAT: 11 CM/S
MV E'TISSUE VEL-SEP: 9 CM/S
MV PEAK A VEL: 0.58 M/S
MV PEAK E VEL: 62 CM/S
MV STENOSIS PRESSURE HALF TIME: 57 MS
MV VALVE AREA P 1/2 METHOD: 3.86 CM2
P AXIS: 41 DEGREES
PHOSPHATE SERPL-MCNC: 3.7 MG/DL (ref 2.3–4.1)
PLATELET # BLD AUTO: 154 THOUSANDS/UL (ref 149–390)
PMV BLD AUTO: 9.5 FL (ref 8.9–12.7)
POTASSIUM SERPL-SCNC: 3.9 MMOL/L (ref 3.5–5.3)
PR INTERVAL: 116 MS
PV PEAK GRADIENT: 3 MMHG
QRS AXIS: 5 DEGREES
QRSD INTERVAL: 80 MS
QT INTERVAL: 374 MS
QTC INTERVAL: 462 MS
RA PRESSURE ESTIMATED: 3 MMHG
RBC # BLD AUTO: 4.05 MILLION/UL (ref 3.88–5.62)
RIGHT ATRIUM AREA SYSTOLE A4C: 18.3 CM2
RIGHT VENTRICLE ID DIMENSION: 4.4 CM
SL CV LEFT ATRIUM LENGTH A2C: 5.4 CM
SL CV LV EF: 69
SL CV PED ECHO LEFT VENTRICLE DIASTOLIC VOLUME (MOD BIPLANE) 2D: 95 ML
SL CV PED ECHO LEFT VENTRICLE SYSTOLIC VOLUME (MOD BIPLANE) 2D: 44 ML
SODIUM SERPL-SCNC: 140 MMOL/L (ref 135–147)
T WAVE AXIS: 18 DEGREES
TRICUSPID ANNULAR PLANE SYSTOLIC EXCURSION: 2.5 CM
VENTRICULAR RATE: 92 BPM
WBC # BLD AUTO: 7.73 THOUSAND/UL (ref 4.31–10.16)

## 2023-09-07 PROCEDURE — 85300 ANTITHROMBIN III ACTIVITY: CPT | Performed by: NURSE PRACTITIONER

## 2023-09-07 PROCEDURE — 85670 THROMBIN TIME PLASMA: CPT | Performed by: NURSE PRACTITIONER

## 2023-09-07 PROCEDURE — C8929 TTE W OR WO FOL WCON,DOPPLER: HCPCS

## 2023-09-07 PROCEDURE — 86146 BETA-2 GLYCOPROTEIN ANTIBODY: CPT | Performed by: NURSE PRACTITIONER

## 2023-09-07 PROCEDURE — 85613 RUSSELL VIPER VENOM DILUTED: CPT | Performed by: NURSE PRACTITIONER

## 2023-09-07 PROCEDURE — 85732 THROMBOPLASTIN TIME PARTIAL: CPT | Performed by: NURSE PRACTITIONER

## 2023-09-07 PROCEDURE — 85305 CLOT INHIBIT PROT S TOTAL: CPT | Performed by: NURSE PRACTITIONER

## 2023-09-07 PROCEDURE — 85027 COMPLETE CBC AUTOMATED: CPT | Performed by: PHYSICIAN ASSISTANT

## 2023-09-07 PROCEDURE — 85730 THROMBOPLASTIN TIME PARTIAL: CPT | Performed by: STUDENT IN AN ORGANIZED HEALTH CARE EDUCATION/TRAINING PROGRAM

## 2023-09-07 PROCEDURE — 93970 EXTREMITY STUDY: CPT | Performed by: SURGERY

## 2023-09-07 PROCEDURE — G1004 CDSM NDSC: HCPCS

## 2023-09-07 PROCEDURE — 85598 HEXAGNAL PHOSPH PLTLT NEUTRL: CPT | Performed by: NURSE PRACTITIONER

## 2023-09-07 PROCEDURE — 85303 CLOT INHIBIT PROT C ACTIVITY: CPT | Performed by: NURSE PRACTITIONER

## 2023-09-07 PROCEDURE — 83735 ASSAY OF MAGNESIUM: CPT | Performed by: PHYSICIAN ASSISTANT

## 2023-09-07 PROCEDURE — 85705 THROMBOPLASTIN INHIBITION: CPT | Performed by: NURSE PRACTITIONER

## 2023-09-07 PROCEDURE — 81240 F2 GENE: CPT | Performed by: NURSE PRACTITIONER

## 2023-09-07 PROCEDURE — 84100 ASSAY OF PHOSPHORUS: CPT | Performed by: PHYSICIAN ASSISTANT

## 2023-09-07 PROCEDURE — 86038 ANTINUCLEAR ANTIBODIES: CPT | Performed by: NURSE PRACTITIONER

## 2023-09-07 PROCEDURE — 85306 CLOT INHIBIT PROT S FREE: CPT | Performed by: NURSE PRACTITIONER

## 2023-09-07 PROCEDURE — 80048 BASIC METABOLIC PNL TOTAL CA: CPT | Performed by: PHYSICIAN ASSISTANT

## 2023-09-07 PROCEDURE — 99223 1ST HOSP IP/OBS HIGH 75: CPT | Performed by: INTERNAL MEDICINE

## 2023-09-07 PROCEDURE — 74178 CT ABD&PLV WO CNTR FLWD CNTR: CPT

## 2023-09-07 PROCEDURE — 86147 CARDIOLIPIN ANTIBODY EA IG: CPT | Performed by: NURSE PRACTITIONER

## 2023-09-07 PROCEDURE — 85730 THROMBOPLASTIN TIME PARTIAL: CPT | Performed by: PHYSICIAN ASSISTANT

## 2023-09-07 PROCEDURE — 81241 F5 GENE: CPT | Performed by: NURSE PRACTITIONER

## 2023-09-07 PROCEDURE — 93970 EXTREMITY STUDY: CPT

## 2023-09-07 PROCEDURE — 99233 SBSQ HOSP IP/OBS HIGH 50: CPT | Performed by: STUDENT IN AN ORGANIZED HEALTH CARE EDUCATION/TRAINING PROGRAM

## 2023-09-07 RX ORDER — ACETAMINOPHEN 325 MG/1
650 TABLET ORAL EVERY 6 HOURS PRN
Status: DISCONTINUED | OUTPATIENT
Start: 2023-09-07 | End: 2023-09-08 | Stop reason: HOSPADM

## 2023-09-07 RX ORDER — APIXABAN 5 MG (74)
KIT ORAL
Qty: 1 EACH | Refills: 0 | Status: SHIPPED | OUTPATIENT
Start: 2023-09-07 | End: 2023-09-07

## 2023-09-07 RX ORDER — DORZOLAMIDE HYDROCHLORIDE AND TIMOLOL MALEATE 20; 5 MG/ML; MG/ML
1 SOLUTION/ DROPS OPHTHALMIC 2 TIMES DAILY
Status: DISCONTINUED | OUTPATIENT
Start: 2023-09-07 | End: 2023-09-08 | Stop reason: HOSPADM

## 2023-09-07 RX ORDER — METHOCARBAMOL 500 MG/1
500 TABLET, FILM COATED ORAL EVERY 6 HOURS PRN
Status: DISCONTINUED | OUTPATIENT
Start: 2023-09-07 | End: 2023-09-08 | Stop reason: HOSPADM

## 2023-09-07 RX ORDER — FUROSEMIDE 10 MG/ML
20 INJECTION INTRAMUSCULAR; INTRAVENOUS ONCE
Status: COMPLETED | OUTPATIENT
Start: 2023-09-07 | End: 2023-09-07

## 2023-09-07 RX ORDER — DORZOLAMIDE HYDROCHLORIDE AND TIMOLOL MALEATE 20; 5 MG/ML; MG/ML
1 SOLUTION/ DROPS OPHTHALMIC 2 TIMES DAILY
COMMUNITY

## 2023-09-07 RX ORDER — TRAMADOL HYDROCHLORIDE 50 MG/1
50 TABLET ORAL EVERY 6 HOURS PRN
Status: DISCONTINUED | OUTPATIENT
Start: 2023-09-07 | End: 2023-09-08 | Stop reason: HOSPADM

## 2023-09-07 RX ADMIN — DORZOLAMIDE HYDROCHLORIDE AND TIMOLOL MALEATE 1 DROP: 22.3; 6.8 SOLUTION/ DROPS OPHTHALMIC at 09:17

## 2023-09-07 RX ADMIN — ACETAMINOPHEN 650 MG: 325 TABLET, FILM COATED ORAL at 17:57

## 2023-09-07 RX ADMIN — APIXABAN 10 MG: 5 TABLET, FILM COATED ORAL at 16:52

## 2023-09-07 RX ADMIN — FUROSEMIDE 20 MG: 10 INJECTION, SOLUTION INTRAMUSCULAR; INTRAVENOUS at 10:30

## 2023-09-07 RX ADMIN — PERFLUTREN 2 ML/MIN: 6.52 INJECTION, SUSPENSION INTRAVENOUS at 08:50

## 2023-09-07 RX ADMIN — HEPARIN SODIUM 15 UNITS/KG/HR: 10000 INJECTION, SOLUTION INTRAVENOUS at 14:44

## 2023-09-07 RX ADMIN — IOHEXOL 100 ML: 350 INJECTION, SOLUTION INTRAVENOUS at 16:36

## 2023-09-07 RX ADMIN — TRAMADOL HYDROCHLORIDE 50 MG: 50 TABLET, COATED ORAL at 19:38

## 2023-09-07 RX ADMIN — ACETAMINOPHEN 650 MG: 325 TABLET, FILM COATED ORAL at 11:41

## 2023-09-07 RX ADMIN — METHOCARBAMOL TABLETS 500 MG: 500 TABLET, COATED ORAL at 17:57

## 2023-09-07 RX ADMIN — CHLORHEXIDINE GLUCONATE 15 ML: 1.2 RINSE ORAL at 09:17

## 2023-09-07 RX ADMIN — DORZOLAMIDE HYDROCHLORIDE AND TIMOLOL MALEATE 1 DROP: 22.3; 6.8 SOLUTION/ DROPS OPHTHALMIC at 17:58

## 2023-09-07 NOTE — ASSESSMENT & PLAN NOTE
CTA PE: acute R pulm emboli, RLL GGO concerning for possible pulm infarct, RV/LV 1.2.   R posterior pleuritic chest pain associated with pulm infarct  intermediate low-risk- PESI 82  TTE w/ mild RV dil, preserved systolic function  BLE duplex negative    · Heparin gtt started in ED, transitioned to eliquis 09/07  · Pulmonary consult- plan for OP f/u after return of thrombosis panel  · Continue PRN tylenol/tramdol for pain  · Avoid sharon NSAIDs in setting of eliquis use

## 2023-09-07 NOTE — ED CARE HANDOFF
Emergency Department Sign Out Note        Sign out and transfer of care from Dr. Loretta Vargas. See Separate Emergency Department note. The patient, Damian Camacho, was evaluated by the previous provider for flank pain.     Workup Completed:  Labs Reviewed   CBC AND DIFFERENTIAL - Abnormal       Result Value Ref Range Status    WBC 11.28 (*) 4.31 - 10.16 Thousand/uL Final    RBC 4.94  3.88 - 5.62 Million/uL Final    Hemoglobin 15.3  12.0 - 17.0 g/dL Final    Hematocrit 47.4  36.5 - 49.3 % Final    MCV 96  82 - 98 fL Final    MCH 31.0  26.8 - 34.3 pg Final    MCHC 32.3  31.4 - 37.4 g/dL Final    RDW 13.5  11.6 - 15.1 % Final    MPV 9.5  8.9 - 12.7 fL Final    Platelets 116  811 - 390 Thousands/uL Final    nRBC 0  /100 WBCs Final    Neutrophils Relative 77 (*) 43 - 75 % Final    Immat GRANS % 0  0 - 2 % Final    Lymphocytes Relative 13 (*) 14 - 44 % Final    Monocytes Relative 10  4 - 12 % Final    Eosinophils Relative 0  0 - 6 % Final    Basophils Relative 0  0 - 1 % Final    Neutrophils Absolute 8.64 (*) 1.85 - 7.62 Thousands/µL Final    Immature Grans Absolute 0.05  0.00 - 0.20 Thousand/uL Final    Lymphocytes Absolute 1.43  0.60 - 4.47 Thousands/µL Final    Monocytes Absolute 1.12  0.17 - 1.22 Thousand/µL Final    Eosinophils Absolute 0.00  0.00 - 0.61 Thousand/µL Final    Basophils Absolute 0.04  0.00 - 0.10 Thousands/µL Final   UA W REFLEX TO MICROSCOPIC WITH REFLEX TO CULTURE - Abnormal    Color, UA Yellow   Final    Clarity, UA Clear   Final    Specific Gravity, UA >=1.030  1.003 - 1.030 Final    pH, UA 5.0  4.5, 5.0, 5.5, 6.0, 6.5, 7.0, 7.5, 8.0 Final    Leukocytes, UA Negative  Negative Final    Nitrite, UA Negative  Negative Final    Protein, UA Trace (*) Negative mg/dl Final    Glucose,  (1/10%) (*) Negative mg/dl Final    Ketones, UA Negative  Negative mg/dl Final    Urobilinogen, UA 1.0  0.2, 1.0 E.U./dl E.U./dl Final    Bilirubin, UA Small (*) Negative Final    Occult Blood, UA Trace-Intact (*) Negative Final   URINE MICROSCOPIC - Abnormal    RBC, UA 4-10 (*) None Seen, 0-1, 1-2, 2-4, 0-5 /hpf Final    WBC, UA None Seen  None Seen, 0-1, 1-2, 0-5, 2-4 /hpf Final    Epithelial Cells None Seen  None Seen, Occasional /hpf Final    Bacteria, UA None Seen  None Seen, Occasional /hpf Final    Ca Oxalate Christina, UA Occasional (*) None Seen /hpf Final   PROTIME-INR - Normal    Protime 13.5  11.6 - 14.5 seconds Final    INR 1.00  0.84 - 1.19 Final   APTT - Normal    PTT 27  23 - 37 seconds Final    Comment: Therapeutic Heparin Range =  60-90 seconds   LIPASE - Normal    Lipase 32  11 - 82 u/L Final   HS TROPONIN I 0HR - Normal    hs TnI 0hr 4  "Refer to ACS Flowchart"- see link ng/L Final    Comment:                                              Initial (time 0) result  If >=50 ng/L, Myocardial injury suggested ;  Type of myocardial injury and treatment strategy  to be determined. If 5-49 ng/L, a delta result at 2 hours and or 4 hours will be needed to further evaluate. If <4 ng/L, and chest pain has been >3 hours since onset, patient may qualify for discharge based on the HEART score in the ED. If <5 ng/L and <3hours since onset of chest pain, a delta result at 2 hours will be needed to further evaluate. HS Troponin 99th Percentile URL of a Health Population=12 ng/L with a 95% Confidence Interval of 8-18 ng/L. Second Troponin (time 2 hours)  If calculated delta >= 20 ng/L,  Myocardial injury suggested ; Type of myocardial injury and treatment strategy to be determined. If 5-49 ng/L and the calculated delta is 5-19 ng/L, consult medical service for evaluation. Continue evaluation for ischemia on ecg and other possible etiology and repeat hs troponin at 4 hours. If delta is <5 ng/L at 2 hours, consider discharge based on risk stratification via the HEART score (if in ED), or TERENCE risk score in IP/Observation.     HS Troponin 99th Percentile URL of a Health Population=12 ng/L with a 95% Confidence Interval of 8-18 ng/L. LACTIC ACID, PLASMA (W/REFLEX IF RESULT > 2.0) - Normal    LACTIC ACID 1.7  0.5 - 2.0 mmol/L Final    Narrative:     Result may be elevated if tourniquet was used during collection. COMPREHENSIVE METABOLIC PANEL    Sodium 992  135 - 147 mmol/L Final    Potassium 4.4  3.5 - 5.3 mmol/L Final    Chloride 106  96 - 108 mmol/L Final    CO2 28  21 - 32 mmol/L Final    ANION GAP 8  mmol/L Final    BUN 18  5 - 25 mg/dL Final    Creatinine 0.96  0.60 - 1.30 mg/dL Final    Comment: Standardized to IDMS reference method    Glucose 90  65 - 140 mg/dL Final    Comment: If the patient is fasting, the ADA then defines impaired fasting glucose as > 100 mg/dL and diabetes as > or equal to 123 mg/dL. Calcium 9.7  8.4 - 10.2 mg/dL Final    AST 14  13 - 39 U/L Final    ALT 22  7 - 52 U/L Final    Comment: Specimen collection should occur prior to Sulfasalazine administration due to the potential for falsely depressed results. Alkaline Phosphatase 54  34 - 104 U/L Final    Total Protein 7.2  6.4 - 8.4 g/dL Final    Albumin 4.2  3.5 - 5.0 g/dL Final    Total Bilirubin 0.84  0.20 - 1.00 mg/dL Final    Comment: Use of this assay is not recommended for patients undergoing treatment with eltrombopag due to the potential for falsely elevated results. N-acetyl-p-benzoquinone imine (metabolite of Acetaminophen) will generate erroneously low results in samples for patients that have taken an overdose of Acetaminophen.     eGFR 78  ml/min/1.73sq m Final    Narrative:     [place] guidelines for Chronic Kidney Disease (CKD):   •  Stage 1 with normal or high GFR (GFR > 90 mL/min/1.73 square meters)  •  Stage 2 Mild CKD (GFR = 60-89 mL/min/1.73 square meters)  •  Stage 3A Moderate CKD (GFR = 45-59 mL/min/1.73 square meters)  •  Stage 3B Moderate CKD (GFR = 30-44 mL/min/1.73 square meters)  •  Stage 4 Severe CKD (GFR = 15-29 mL/min/1.73 square meters)  •  Stage 5 End Stage CKD (GFR <15 mL/min/1.73 square meters)  Note: GFR calculation is accurate only with a steady state creatinine   APTT   B-TYPE NATRIURETIC PEPTIDE (BNP)         ED Course / Workup Pending (followup):  PE Study with CT Abdomen and Pelvis with contrast    Addendum Date: 9/6/2023 Addendum:   ADDENDUM: Impression should also include: Findings concerning for right lower lobe pulmonary infarct    Result Date: 9/6/2023  Narrative: CT PULMONARY ANGIOGRAM OF THE CHEST AND CT ABDOMEN AND PELVIS WITH INTRAVENOUS CONTRAST INDICATION:   Right mid back pain. COMPARISON:  None. TECHNIQUE:  CT examination of the chest, abdomen and pelvis was performed. Thin section CT angiographic technique was used in the chest in order to evaluate for pulmonary embolus and coronal 3D MIP postprocessing was performed on the acquisition scanner. Multiplanar 2D reformatted images were created from the source data. This examination, like all CT scans performed in the [facility], was performed utilizing techniques to minimize radiation dose exposure, including the use of iterative reconstruction and automated exposure control. Radiation dose length product (DLP) for this visit:  526 mGy-cm IV Contrast:  100 mL of iohexol (OMNIPAQUE) Enteric Contrast:  Enteric contrast was not administered. FINDINGS: CHEST PULMONARY ARTERIAL TREE: Pulmonary emboli emboli noted at the distal right pulmonary artery extending into each of the lobar branches. Small filling filling defect noted within the lingular branches series 2/104. LUNGS: Peripheral groundglass opacity noted within the right lower lobe as seen on series 5/63, this appears asymmetrically increased, raising suspicion for developing pulmonary infarct PLEURA:  Unremarkable. HEART/AORTA: RV diameter at the level of the AV valve = 4 cm LV diameter at the level of the AV valve = 3.3 cm No thoracic aortic aneurysm. MEDIASTINUM AND NADINE:  Unremarkable. CHEST WALL AND LOWER NECK:  Unremarkable.  ABDOMEN LIVER/BILIARY TREE: Bandlike area of hypodense parenchyma in the right lobe, may represent parenchymal scar GALLBLADDER:  No calcified gallstones. No pericholecystic inflammatory change. SPLEEN:  Unremarkable. PANCREAS:  Unremarkable. ADRENAL GLANDS:  Unremarkable. KIDNEYS/URETERS: 1 cm spherical hypodensity in the left midpole series 2/300, this measures> simple fluid attenuation, unknown if there is evidence of internal enhancement. STOMACH AND BOWEL:  Unremarkable. APPENDIX:  No findings to suggest appendicitis. ABDOMINOPELVIC CAVITY:  No ascites. No pneumoperitoneum. No lymphadenopathy. VESSELS:  Unremarkable for patient's age. PELVIS REPRODUCTIVE ORGANS:  Unremarkable for patient's age. URINARY BLADDER: Contains small calcified gallstones ABDOMINAL WALL/INGUINAL REGIONS: Bilateral inguinal hernias, greater on the left Right gluteal region spinal stimulator OSSEOUS STRUCTURES:  No acute fracture or destructive osseous lesion. Grade 1 anterolisthesis of L5 on S1 with L5 pars defects     Impression: 1. Acute right pulmonary emboli 2. The calculated ratio of right ventricular to left ventricular diameter (RV/LV ratio) is 1.2. This is greater than 0.9, which is abnormal and indicates right heart strain. An abnormal RV/LV ratio has been shown to be associated with an increased risk of 30 day mortality in the setting of acute pulmonary embolism. 3.  No acute abdominopelvic abnormality 4. Indeterminate left renal lesion, recommend nonemergent renal mass CT I personally discussed impression 1-2 with Clarke Pereira at 9/6/23 10:08 PM Workstation performed: [id number]                                     ED Course as of 09/06/23 2229   Wed Sep 06, 2023   2204 Case discussed and care assumed from Dr. Andres Caraballo pending CT imaging and disposition. 65 CT results noted patient clinically and hemodynamically stable at this time in the ED resting comfortably.   Informed of results will initiate heparin infusion and PERT activation initiated critical care team contacted and notified. 1 Spoke with Dr. Jaison Leyva critical care on-call as well as PERT team and interventional radiology reviewed case. Recommendation is for anticoagulation which has been initiated and admission to [facility] critical care accepts for admission. Procedures  Medical Decision Making  Pulmonary embolism [facility]): acute illness or injury  Pulmonary infarct [facility]): acute illness or injury  Amount and/or Complexity of Data Reviewed  Labs: ordered. Radiology: ordered and independent interpretation performed. Risk  Prescription drug management. Decision regarding hospitalization. Disposition  Final diagnoses:   Pulmonary embolism ([address])   Pulmonary infarct [facility])     Time reflects when diagnosis was documented in both MDM as applicable and the Disposition within this note     Time User Action Codes Description Comment    9/6/2023 10:28 PM Nadege Mckee Add [I26.99] Pulmonary embolism ([address])     9/6/2023 10:28 PM Nadege Mckee Add [I26.99] Pulmonary infarct [facility])       ED Disposition     ED Disposition   Admit    Condition   Stable    Date/Time   Wed Sep 6, 2023 10:28 PM    Comment   Case was discussed with Dr. Jaison Leyva and the patient's admission status was agreed to be Admission Status: inpatient status to the service of Dr. Jaison Leyva. Follow-up Information    None       Patient's Medications    No medications on file     No discharge procedures on file.        ED Provider  Electronically Signed by     Theresa Gann DO  09/06/23 7529

## 2023-09-07 NOTE — H&P
[address] [place] [address]  H&P  Name: Damian Oliveira. 68 y.o. male I MRN: [id number]  Unit/Bed#: ED 01 I Date of Admission: 9/6/2023   Date of Service: 9/6/2023 I Hospital Day: 0      Assessment/Plan   * Acute pulmonary embolism (HCC)  Assessment & Plan  · CTA PE: acute R pulm emboli. Possible pulm infarct. RV/LV 1.2.  · Intermediate low-risk. · Troponin 4, BNP 45, PESI 82.  · Heparin gtt started in ED. · Pulmonary consult. · Echo and Venous duplex BLE pending. Renal lesion  Assessment & Plan  · Seen on CT.   · Outpatient follow up recommended. Sleep apnea  Assessment & Plan  · H/o non-compliance per Care Everywhere records. · Will offer CPAP HS. History of Present Illness     HPI: Damian Camacho is a 68 y.o. who presents with a PMH of IRENE, anemia, ambulatory dysfunction s/p right knee replacement failure and chronic back pain s/p fusion. Patient reported to [address] [place] [address] ED with c/o right flank pain x2 days that worsens with laying down. He also endorsed nausea and muscle spasms in the same area. He denied SOB, chest pain, vomiting, fever/chills, headache and diarrhea. A CTA PE was completed showing a right PE with a possible pulmonary infarct with an RV/LV ration 1.2. Troponin and BMP were within normal limits. PESI 82. Heparin gtt was initiated in the ED. PERT team aware and OK to keep at [address] [place] [address]. History obtained from chart review and the patient. Historical Information   No past medical history on file. No past surgical history on file. No current outpatient medications Allergies   Allergen Reactions   • Latex Rash   • Penicillins Dermatitis, Other (See Comments) and Rash      Social History     Tobacco Use   • Smoking status: Never   • Smokeless tobacco: Never   Vaping Use   • Vaping Use: Never used   Substance Use Topics   • Alcohol use: Never   • Drug use: Never    History reviewed. No pertinent family history.        Objective                            Vitals I/O      Most Recent Min/Max in 24hrs   Temp 98.1 °F (36.7 °C) Temp  Min: 98.1 °F (36.7 °C)  Max: 98.1 °F (36.7 °C)   Pulse 85 Pulse  Min: 85  Max: 96   Resp 22 Resp  Min: 18  Max: 22   /67 BP  Min: 125/71  Max: 187/98   O2 Sat 96 % SpO2  Min: 95 %  Max: 96 %      Intake/Output Summary (Last 24 hours) at 9/6/2023 2304  Last data filed at 9/6/2023 2144  Gross per 24 hour   Intake 2000 ml   Output --   Net 2000 ml         Diet Regular; Regular House     Invasive Monitoring Physical exam   Arterial Line  C[place]cent BP    No data recorded   MAP    No data recorded    Physical Exam  Eyes:      General:         Right eye: No discharge. Left eye: No discharge. Extraocular Movements: Extraocular movements intact. Conjunctiva/sclera: Conjunctivae normal.      Pupils: Pupils are equal, round, and reactive to light. Skin:     General: Skin is warm and dry. Capillary Refill: Capillary refill takes less than 2 seconds. Coloration: Skin is not jaundiced or pale. Findings: No rash. HENT:      Head: Normocephalic and atraumatic. Right Ear: Hearing normal.      Left Ear: Hearing normal.      Nose: No congestion or rhinorrhea. Mouth/Throat:      Mouth: Mucous membranes are moist.   Neck:      Vascular: No JVD. No midline tenderness Cardiovascular:      Rate and Rhythm: Normal rate and regular rhythm. Pulses: Normal pulses. Musculoskeletal:         General: Tenderness (Right paraspinal muscle area.) present. No swelling, deformity or signs of injury. Normal range of motion. Right lower leg: No edema. Left lower leg: No edema. Abdominal:      General: Bowel sounds are normal. There is no distension. Palpations: Abdomen is soft. Tenderness: There is no abdominal tenderness. There is no guarding. Constitutional:       General: He is not in acute distress. Appearance: He is well-developed and well-nourished.    Pulmonary:      Effort: Pulmonary effort is normal. No respiratory distress. Breath sounds: Normal breath sounds. Neurological:      General: No focal deficit present. Mental Status: He is alert and oriented to person, place and time. Sensory: Sensation is intact. Motor: Strength full and intact in all extremities. Diagnostic Studies      EKG: NSR  Imaging:  I have personally reviewed pertinent reports.    and I have personally reviewed pertinent films in PACS     Medications:  Scheduled PRN   chlorhexidine, 15 mL, Q12H [address]      heparin (porcine), 4,000 Units, Q6H PRN  heparin (porcine), 8,000 Units, Q6H PRN       Continuous    heparin (porcine), 3-30 Units/kg/hr (Order-Specific), Last Rate: 18 Units/kg/hr (09/06/23 2220)         Labs:    CBC    Recent Labs     09/06/23 1954   WBC 11.28*   HGB 15.3   HCT 47.4        BMP    Recent Labs     09/06/23 1954   SODIUM 142   K 4.4      CO2 28   AGAP 8   BUN 18   CREATININE 0.96   CALCIUM 9.7       Coags    Recent Labs     09/06/23 1954   INR 1.00   PTT 27        Additional Electrolytes  No recent results       Blood Gas    No recent results  No recent results LFTs  Recent Labs     09/06/23 1954   ALT 22   AST 14   ALKPHOS 54   ALB 4.2   TBILI 0.84       Infectious  No recent results  Glucose  Recent Labs     09/06/23 1954   GLUC 90           Anticipated Length of Stay is > 2 [address]

## 2023-09-07 NOTE — CASE MANAGEMENT
Case Management Assessment & Discharge Planning Note    Patient name Nataliya Grover. Location /-01 MRN [id number]  : 1950 Date 2023       Current Admission Date: 2023  Current Admission Diagnosis:Acute pulmonary embolism [facility])   Patient Active Problem List    Diagnosis Date Noted   • Acute pulmonary embolism ([address]) 2023   • Back pain 2023   • Renal lesion 2023   • Acquired absence of right knee 2019   • Constipation, unspecified 2019   • Difficulty in walking, not elsewhere classified 2019   • Fusion of spine, cervical region 2019   • Unspecified osteoarthritis, unspecified site 2019   • Obesity with body mass index 30 or greater 2019   • Anemia 2018   • Sleep apnea 2018      LOS (days): 1  Geometric Mean LOS (GMLOS) (days): 2.50  Days to GMLOS:1.8     OBJECTIVE:    Risk of Unplanned Readmission Score: 7.06         Current admission status: Inpatient       Preferred Pharmacy:   PATIENT/FAMILY REPORTS NO PREFERRED PHARMACY  No address on file      [id number] [place] - [address] [place]  Phone: [phone] Fax: [phone]    Primary Care Provider: Zahida Vasquez DO    Primary Insurance: MEDICARE  Secondary Insurance: BLUE CROSS    ASSESSMENT:  [place] Proxies    There are no active Health Care Proxies on file. Patient Information  Admitted from[de-identified] Home  Mental Status: Alert  During Assessment patient was accompanied by: Not accompanied during assessment  Assessment information provided by[de-identified] Patient  Primary Caregiver: Self  Support Systems: Friends/neighbors, Family members  [place] of Residence: [address] entry access options.  Select all that apply.: Stairs  Number of steps to enter home.: 1  Type of Current Residence: [facility]  In the last 12 months, was there a time when you were not able to pay the mortgage or rent on time?: No  In the last 12 months, how many places have you lived?: 1  In the last 12 months, was there a time when you did not have a steady place to sleep or slept in a shelter (including now)?: No  Living Arrangements: Lives Alone    Activities of Daily Living Prior to Admission  Functional Status: Independent  Completes ADLs independently?: Yes  Ambulates independently?: Yes  Does patient use assisted devices?: No  Does patient currently own DME?: No  Does patient have a history of Outpatient Therapy (PT/OT)?: Yes ([facility])  Does the patient have a history of Short-Term Rehab?: Yes ([facility] after knee replacement)  Does patient have a history of HHC?: Yes (unable to recall name of agency)  Does patient currently have [facility]?: No         Patient Information Continued  Income Source: Pension/residential  Does patient have prescription coverage?: Yes  Within the past 12 months, you worried that your food would run out before you got the money to buy more.: Never true  Within the past 12 months, the food you bought just didn't last and you didn't have money to get more.: Never true  Food insecurity resource given?: No  Does patient receive dialysis treatments?: No  Does patient have a history of substance abuse?: No  Does patient have a history of Mental Health Diagnosis?: No         Means of Transportation  Means of Transport to Appts[de-identified] Drives Self  In the past 12 months, has lack of transportation kept you from medical appointments or from getting medications?: No  In the past 12 months, has lack of transportation kept you from meetings, work, or from getting things needed for daily living?: No  Was application for public transport provided?: No        DISCHARGE DETAILS:    Discharge planning discussed with[de-identified] patient  Freedom of Choice: Yes     CM contacted family/caregiver?: No- see comments (declined)             Contacts  Patient Contacts: sister, kacy  Relationship to Patient[de-identified] Family Would you like to participate in our [facility] Nightpro service program?  : No - Declined                              pt from home, lives alone. Pt IPTA. Pt discharging on Eliquis, CM calling pts pharmacy to inquire cost of medication. As per pharmacy, medication is $500. CM discussing cost with patient, pt sts he can afford cost but wants to call his "" to see if she can getif for him at a cheaper cost.  Cm in room while pt calls insurance company, pt putting  on speaker. As per , pt has a $480 decuctible that must be met before insurance will "kick in", pt verbalizes understanding. Carmina suggesting that pt call the [place], who may be able to obtain medications for pt at a lower cost, pt sts he will "give them a call."   also informing pt that his also has PACE coverage and that pt should be sure to notify his pharmacy, as PACE coverage may entitle pt to meds at a lower cost, pt verbalizes understanding.     CM aslo giving pt a Eliquis coupon for one month free supply of medication, pt is agreeable to use coupon for first month of medication and then to further explore his options for the payment of medication

## 2023-09-07 NOTE — DISCHARGE SUMMARY
[address] [facility]  Discharge- Artem Lemon. 1950, 68 y.o. male MRN: [id number]  Unit/Bed#: -01 Encounter: [id number]  Primary Care Provider: Eric Lee DO   Date and time admitted to hospital: 9/6/2023  7:45 PM    * Acute pulmonary embolism [facility])  Assessment & Plan  CTA PE: acute R pulm emboli, RLL GGO concerning for possible pulm infarct, RV/LV 1.2. R posterior pleuritic chest pain associated with pulm infarct  intermediate low-risk- PESI 82  TTE w/ mild RV dil, preserved systolic function  BLE duplex negative    · Heparin gtt started in ED, transitioned to eliquis 09/07  · Pulmonary consult- plan for OP f/u after return of thrombosis panel  · Continue PRN tylenol/tramdol for pain  · Avoid sharon NSAIDs in setting of eliquis use    Renal lesion  Assessment & Plan  Seen on CT    · Outpatient follow up recommended w/ PCP  · F/u read of renal CT    Sleep apnea  Assessment & Plan  H/o non-compliance per Care Everywhere records  Patient states that he hasn't worn CPAP in 4 years    · CPAP QHS      Medical Problems     Resolved Problems  Date Reviewed: 9/7/2023   None         Admission Date:   Admission Orders (From admission, onward)     Ordered        09/06/23 2226  INPATIENT ADMISSION  Once                        Admitting Diagnosis: Pulmonary infarct ([address]) [I26.99]  Pulmonary embolism ([address]) [I26.99]    HPI:   From H&P 09/06:   "68 y. o. who presents with a PMH of IRENE, anemia, ambulatory dysfunction s/p right knee replacement failure and chronic back pain s/p fusion. Patient reported to [facility] ED with c/o right upper flank/lower chest pain x2 days that worsens with laying down.  He also endorsed nausea and muscle spasms in the same area. Kenneth Fair denied SOB, chest pain, vomiting, fever/chills, headache and diarrhea.  A CTA PE was completed showing a right PE with a possible pulmonary infarct with an RV/LV ration 1.2.  Troponin and BMP were within normal limits.  PESI 82.  Heparin gtt was initiated in the ED.  PERT team aware and OK to keep at [address]    Procedures Performed:   Orders Placed This Encounter   Procedures   • ED ECG Documentation Only     Summary of Hospital Course:   Heparin gtt d/c and transitioned to eliquis 09/07    Significant Findings, Care, Treatment and Services Provided:   Consults: Pulmonary Medicine  9/6/23 CTA c/a/p: acute Right pulm emboli. Possible pulm infarct. RV/LV 1.2. Left renal lesion  9/7/23 ECHO: LV EF 06%, normal systolic function. RV cavity mildly dilated, systolic function normal. RA mildly dilated. 9/7/23 Venous duplex b/l lower extremity: No evidence of acute or chronic DVT b/l  9/7/23 CT renal for L renal lesion: read pending    Complications: N/A    Condition at Discharge: good     Physical Exam  Eyes:      General: No scleral icterus. Extraocular Movements: Extraocular movements intact. Pupils: Pupils are equal, round, and reactive to light. Skin:     General: Skin is warm and dry. Capillary Refill: Capillary refill takes less than 2 seconds. HENT:      Head: Normocephalic and atraumatic. Neck:     Vascular: No JVD. Cardiovascular:      Rate and Rhythm: Normal rate and regular rhythm. Pulses: Normal pulses. Heart sounds: Normal heart sounds. Musculoskeletal:      Right lower leg: Trace Edema present. Left lower leg: Trace Edema present. Abdominal:      General: Bowel sounds are normal.      Tenderness: There is no abdominal tenderness. Constitutional:       General: He is not in acute distress. Appearance: He is not ill-appearing or toxic-appearing. Pulmonary:      Effort: Pulmonary effort is normal.      Breath sounds: Normal breath sounds. Comments: On room air. Neurological:      General: No focal deficit present. Mental Status: He is alert and oriented to person, place and time. Mental status is at baseline. Sensory: Sensation is intact.       Motor: gross motor function is at baseline for patient. Genitourinary/Anorectal:     Comments: Deferred. No abdullahi in place. Vitals and nursing note reviewed. Discharge instructions/Information to patient and family:   See after visit summary for information provided to patient and family. Provisions for Follow-Up Care:  See after visit summary for information related to follow-up care and any pertinent home health orders. PCP: Rebel Rodríguez DO    Disposition: Home    Planned Readmission: No    Discharge Statement   I spent 10 minutes discharging the patient. This time was spent on the day of discharge. I had direct contact with the patient on the day of discharge. Additional documentation is required if more than 30 minutes were spent on discharge. Discharge Medications:  See after visit summary for reconciled discharge medications provided to patient and family.

## 2023-09-07 NOTE — ASSESSMENT & PLAN NOTE
· CTA PE: acute R pulm emboli. Possible pulm infarct. RV/LV 1.2.  · Intermediate low-risk. · Troponin 4, BNP 45, PESI 82.  · Heparin gtt started in ED. · Pulmonary consult. · Echo and Venous duplex BLE pending.

## 2023-09-07 NOTE — PLAN OF CARE
Problem: MOBILITY - ADULT  Goal: Maintain or return to baseline ADL function  Description: INTERVENTIONS:  -  Assess patient's ability to carry out ADLs; assess patient's baseline for ADL function and identify physical deficits which impact ability to perform ADLs (bathing, care of mouth/teeth, toileting, grooming, dressing, etc.)  - Assess/evaluate cause of self-care deficits   - Assess range of motion  - Assess patient's mobility; develop plan if impaired  - Assess patient's need for assistive devices and provide as appropriate  - Encourage maximum independence but intervene and supervise when necessary  - Involve family in performance of ADLs  - Assess for home care needs following discharge   - Consider OT consult to assist with ADL evaluation and planning for discharge  - Provide patient education as appropriate  Outcome: Progressing  Goal: Maintains/Returns to pre admission functional level  Description: INTERVENTIONS:  - Perform BMAT or MOVE assessment daily.   - Set and communicate daily mobility goal to care team and patient/family/caregiver. - Collaborate with rehabilitation services on mobility goals if consulted  - Perform Range of Motion 2 times a day. - Reposition patient every 2 hours.   - Dangle patient 2 times a day  - Stand patient 2 times a day  - Ambulate patient 2 times a day  - Out of bed to chair 2 times a day   - Out of bed for meals 2 times a day  - Out of bed for toileting  - Record patient progress and toleration of activity level   Outcome: Progressing     Problem: CARDIOVASCULAR - ADULT  Goal: Maintains optimal cardiac output and hemodynamic stability  Description: INTERVENTIONS:  - Monitor I/O, vital signs and rhythm  - Monitor for S/S and trends of decreased cardiac output  - Administer and titrate ordered vasoactive medications to optimize hemodynamic stability  - Assess quality of pulses, skin color and temperature  - Assess for signs of decreased coronary artery perfusion  - Instruct patient to report change in severity of symptoms  Outcome: Progressing  Goal: Absence of cardiac dysrhythmias or at baseline rhythm  Description: INTERVENTIONS:  - Continuous cardiac monitoring, vital signs, obtain 12 lead EKG if ordered  - Administer antiarrhythmic and heart rate control medications as ordered  - Monitor electrolytes and administer replacement therapy as ordered  Outcome: Progressing     Problem: HEMATOLOGIC - ADULT  Goal: Maintains hematologic stability  Description: INTERVENTIONS  - Assess for signs and symptoms of bleeding or hemorrhage  - Monitor labs  - Administer supportive blood products/factors as ordered and appropriate  Outcome: Progressing

## 2023-09-07 NOTE — PLAN OF CARE
Problem: MOBILITY - ADULT  Goal: Maintain or return to baseline ADL function  Description: INTERVENTIONS:  -  Assess patient's ability to carry out ADLs; assess patient's baseline for ADL function and identify physical deficits which impact ability to perform ADLs (bathing, care of mouth/teeth, toileting, grooming, dressing, etc.)  - Assess/evaluate cause of self-care deficits   - Assess range of motion  - Assess patient's mobility; develop plan if impaired  - Assess patient's need for assistive devices and provide as appropriate  - Encourage maximum independence but intervene and supervise when necessary  - Involve family in performance of ADLs  - Assess for home care needs following discharge   - Consider OT consult to assist with ADL evaluation and planning for discharge  - Provide patient education as appropriate  Outcome: Progressing  Goal: Maintains/Returns to pre admission functional level  Description: INTERVENTIONS:  - Perform BMAT or MOVE assessment daily.   - Set and communicate daily mobility goal to care team and patient/family/caregiver. - Collaborate with rehabilitation services on mobility goals if consulted  - Perform Range of Motion 3 times a day. - Reposition patient every 2 hours.   - Dangle patient 3 times a day  - Stand patient 3 times a day  - Ambulate patient 3 times a day  - Out of bed to chair 3 times a day   - Out of bed for meals 3 times a day  - Out of bed for toileting  - Record patient progress and toleration of activity level   Outcome: Progressing     Problem: CARDIOVASCULAR - ADULT  Goal: Maintains optimal cardiac output and hemodynamic stability  Description: INTERVENTIONS:  - Monitor I/O, vital signs and rhythm  - Monitor for S/S and trends of decreased cardiac output  - Administer and titrate ordered vasoactive medications to optimize hemodynamic stability  - Assess quality of pulses, skin color and temperature  - Assess for signs of decreased coronary artery perfusion  - Instruct patient to report change in severity of symptoms  Outcome: Progressing  Goal: Absence of cardiac dysrhythmias or at baseline rhythm  Description: INTERVENTIONS:  - Continuous cardiac monitoring, vital signs, obtain 12 lead EKG if ordered  - Administer antiarrhythmic and heart rate control medications as ordered  - Monitor electrolytes and administer replacement therapy as ordered  Outcome: Progressing     Problem: HEMATOLOGIC - ADULT  Goal: Maintains hematologic stability  Description: INTERVENTIONS  - Assess for signs and symptoms of bleeding or hemorrhage  - Monitor labs  - Administer supportive blood products/factors as ordered and appropriate  Outcome: Progressing

## 2023-09-07 NOTE — CONSULTS
Pulmonary Medicine-Consultation  Austyn Greenwood. 68 y.o. male MRN: [id number]  Unit/Bed#: -01 Encounter: [id number]      Assessment:  1. Acute pulmonary embolism-low risk/moderate to large clot burden low PESI score, normal BNP/troponin. No significant RV strain on TTE. Mildly dilated RV. 2. RLL GGO-likely due to pulmonary infarct  3. Class II obesity  4. Left renal lesion    Recommendations/discussion:  • Agree with current management for PE,  assisting with insurance approval for oral NOAC before switching  • This seems to be unprovoked PE, not up-to-date on his cancer screening, contributing factors is class II obesity/not active at home as before several weeks ago due to lumbar pain/radiculopathy  • Discussed extensively with the patient, needs at least 6 months therapy vs lifelong depending on other work-up  • Needs to follow his PCP get colonoscopy/prostate check  • CT renal/abdomen for the renal lesion noted on CT chest  • Thrombosis panel pending, would be skewed by heparin however will follow for prothrombin gene mutation, factor V Leiden mutation tests  • Counseled about restricting calories intake  • We will arrange outpatient follow-up with pulmonary    Discussed with the primary team Dr. Cem Tillman    Pulmonary will sign off, please do not hesitate to call with any questions      Physician Requesting Consult: Christina Meraz MD PhD    Reason for Consult / Principal Problem: acute pulmonary embolism     HPI:   68 y.o. male with a h/o class II obesity, IRENE/CPAP, chronic low back pain, cervicalgia, lumbar spinal stimulator    Presented to the ED 9/6 with right flank pain for few days, muscle spasm around the same area. Reported some ambulatory dysfunction s/p right knee replacement failure. A CT PE showed distal right PA pulmonary embolism, small filling defects at the lingular branches. Also noted peripheral GGO at the RLL.     Pt HD stable, Pesi score of 82, called PERT team at the beginning due to elevated RV/LV ratio. Felt stable/submassive pulmonary embolism admitted to  to for close monitoring. Started on unfractionated heparin, no intervention indicated. TTE showed mildly dilated RV/normal systolic function, estimated RA pressure 3 mmHg/normal IVC. On my assessment, patient is resting in chair, reports feeling better/easier to breathe without significant right lower pleuritic chest pain as compared to the admission time. Reports current issues started few days ago with more shortness of breath, not able to take a deep breath. No syncope or presyncope. Reports that he is being semiactive for the past few months but not completely immobilized, no long trips, recent surgeries or procedures. Also states that he is not up-to-date on his cancer screening, last colonoscopy was about 20 years ago so as prostate check. No recent unintentional weight loss, or anorexia, actively trying to lose weight by restricting calories intake. No family history of hypercoagulable state at young age. Inpatient consult to Pulmonology  Consult performed by: Won Herrera MD  Consult ordered by: Kael [email or web address]          Review of Systems  As per hpi, all other systems reviewed and were negative      Studies:    Imaging Studies: I have personally reviewed pertinent films in PACS    EKG, Pathology, and Other Studies: I have personally reviewed pertinent films in PACS    Pulmonary Results (PFTs, PSG): None in file    Historical Information   History reviewed. No pertinent past medical history. History reviewed. No pertinent surgical history.   Social History   Social History     Substance and Sexual Activity   Alcohol Use Never     Social History     Substance and Sexual Activity   Drug Use Never     Social History     Tobacco Use   Smoking Status Never   Smokeless Tobacco Never     E-Cigarette/Vaping   • E-Cigarette Use Never User      E-Cigarette/Vaping Substances         Family History: History reviewed. No pertinent family history. Meds/Allergies   all current active meds have been reviewed    Allergies   Allergen Reactions   • Latex Rash   • Penicillins Dermatitis, Other (See Comments) and Rash       Objective   Vitals: Blood pressure 126/77, pulse 80, temperature 99.3 °F (37.4 °C), temperature source Temporal, resp. rate 18, height 5' 6" (1.676 m), weight 101 kg (222 lb 10.6 oz), SpO2 96 %. ,Body mass index is 35.94 kg/m². Intake/Output Summary (Last 24 hours) at 9/7/2023 1139  Last data filed at 9/7/2023 0901  Gross per 24 hour   Intake 2407.75 ml   Output 300 ml   Net 2107.75 ml     Invasive Devices     Peripheral Intravenous Line  Duration           Peripheral IV 09/06/23 Distal;Left;Upper;Ventral (anterior) Arm <1 day                Physical Exam  Body mass index is 35.94 kg/m². Gen: not in acute distress, morbidly obese  Neck/Eyes: supple, PERRL  Ear: normal appearance, no significant hearing impairment  Nose:  normal nasal mucosa, no drainage  Mouth:  unremarkable/normal appearance of lips, teeth and gums  Oropharynx: mucosa is moist, no focal lesions or erythema  Salivary glands: soft nontender  Chest: normal respiratory efforts, diminished but clear breath sounds bilaterally  CV: RRR, no accentuated S2, no murmurs appreciated, no edema  Abdomen: soft, non tender  Extremities:  No observed deformity, no calf/thighs tenderness  Skin: unremarkable  Neuro: AAO X3, no focal motor deficit       Lab Results: I have personally reviewed pertinent lab results. None    Portions of the record may have been created with voice recognition software. Occasional wrong word or "sound a like" substitutions may have occurred due to the inherent limitations of voice recognition software. Read the chart carefully and recognize, using context, where substitutions have occurred.

## 2023-09-07 NOTE — PROGRESS NOTES
[address] [facility]  Progress Note  Name: Effie Omalley I  MRN: [id number]  Unit/Bed#: -01 I Date of Admission: 9/6/2023   Date of Service: 9/7/2023 I Hospital Day: 1    Assessment/Plan   * Acute pulmonary embolism (HCC)  Assessment & Plan  · CTA PE: acute R pulm emboli. Possible pulm infarct. RV/LV 1.2.  · Intermediate low-risk. · Troponin 4, BNP 45, PESI 82.  · Heparin gtt started in ED. · Pulmonary consult. · Echo and Venous duplex BLE pending. Renal lesion  Assessment & Plan  · Seen on CT.   · Outpatient follow up recommended. Sleep apnea  Assessment & Plan  · H/o non-compliance per Care Everywhere records. · Patient states that he hasn't worn CPAP in 4 years. ICU Core Measures     A: Assess, Prevent, and Manage Pain · Has pain been assessed? Yes  · Need for changes to pain regimen? No   B: Both SAT/SAT  · N/A   C: Choice of Sedation · RASS Goal: N/A patient not on sedation  · Need for changes to sedation or analgesia regimen? NA   D: Delirium · CAM-ICU: Negative   E: Early Mobility  · Plan for early mobility? Yes   F: Family Engagement · Plan for family engagement today? Yes         Prophylaxis:  VTE VTE covered by:  heparin (porcine), Intravenous, 18 Units/kg/hr at 09/07/23 0000  heparin (porcine), Intravenous  heparin (porcine), Intravenous       Stress Ulcer  not ordered       Subjective   HPI/24hr events: There were no acute events reported overnight. Patient remains on a heparin gtt. Echo and venous duplex pending. He endorses his baseline chronic back. He denies chest pain, abdominal pain, SOB, headache and NVD.         Objective                            Vitals I/O      Most Recent Min/Max in 24hrs   Temp 98 °F (36.7 °C) Temp  Min: 98 °F (36.7 °C)  Max: 98.1 °F (36.7 °C)   Pulse 83 Pulse  Min: 81  Max: 96   Resp 14 Resp  Min: 12  Max: 22   /76 BP  Min: 125/71  Max: 187/98   O2 Sat 96 % SpO2  Min: 95 %  Max: 97 %      Intake/Output Summary (Last 24 hours) at 9/7/2023 0425  Last data filed at 9/7/2023 0000  Gross per 24 hour   Intake 2030 ml   Output --   Net 2030 ml         Diet Regular; Regular House     Invasive Monitoring Physical exam   Arterial Line  [place] BP    No data recorded   MAP    No data recorded    Physical Exam  Eyes:      General:         Right eye: No discharge. Left eye: No discharge. Extraocular Movements: Extraocular movements intact. Conjunctiva/sclera: Conjunctivae normal.      Pupils: Pupils are equal, round, and reactive to light. Skin:     General: Skin is warm and dry. Capillary Refill: Capillary refill takes less than 2 seconds. Coloration: Skin is not jaundiced or pale. Findings: No rash. HENT:      Head: Normocephalic and atraumatic. Right Ear: Hearing normal.      Left Ear: Hearing normal.      Nose: No congestion or rhinorrhea. Mouth/Throat:      Mouth: Mucous membranes are moist.   Neck:      Vascular: No JVD. No midline tenderness Cardiovascular:      Rate and Rhythm: Normal rate and regular rhythm. Pulses: Normal pulses. Musculoskeletal:         General: No swelling, tenderness, deformity or signs of injury. Normal range of motion. Right lower leg: No edema. Left lower leg: No edema. Abdominal:      General: Bowel sounds are normal. There is no distension. Palpations: Abdomen is soft. Tenderness: There is no abdominal tenderness. There is no guarding. Constitutional:       General: He is not in acute distress. Appearance: He is well-developed and well-nourished. He is not ill-appearing. Pulmonary:      Effort: Pulmonary effort is normal.      Breath sounds: Normal breath sounds. Neurological:      General: No focal deficit present. Mental Status: He is alert and oriented to person, place and time. He is CAM ICU negative. Sensory: Sensation is intact. Motor: Strength full and intact in all extremities. Diagnostic Studies           Medications:  Scheduled PRN   chlorhexidine, 15 mL, Q12H ISATU      heparin (porcine), 4,000 Units, Q6H PRN  heparin (porcine), 8,000 Units, Q6H PRN       Continuous    heparin (porcine), 3-30 Units/kg/hr (Order-Specific), Last Rate: 18 Units/kg/hr (09/07/23 0000)         Labs:    CBC    Recent Labs     09/06/23 1954   WBC 11.28*   HGB 15.3   HCT 47.4        BMP    Recent Labs     09/06/23 1954   SODIUM 142   K 4.4      CO2 28   AGAP 8   BUN 18   CREATININE 0.96   CALCIUM 9.7       Coags    Recent Labs     09/06/23 1954   INR 1.00   PTT 27        Additional Electrolytes  No recent results       Blood Gas    No recent results  No recent results LFTs  Recent Labs     09/06/23 1954   ALT 22   AST 14   ALKPHOS 54   ALB 4.2   TBILI 0.84       Infectious  No recent results  Glucose  Recent Labs     09/06/23 1954   GLUC [address], [place]

## 2023-09-07 NOTE — ASSESSMENT & PLAN NOTE
H/o non-compliance per Care Everywhere records  Patient states that he hasn't worn CPAP in 4 years    · CPAP QHS

## 2023-09-07 NOTE — ASSESSMENT & PLAN NOTE
· H/o non-compliance per Care Everywhere records. · Patient states that he hasn't worn CPAP in 4 years.

## 2023-09-07 NOTE — INCIDENTAL FINDINGS
The following findings require follow up:  Radiographic finding   Finding: Left renal lesion   Follow up required: Renal US, possible bx   Follow up should be done within 4 week(s)    Please notify the following clinician to assist with the follow up:   Dr. Ashok Rivera

## 2023-09-08 ENCOUNTER — TELEPHONE (OUTPATIENT)
Dept: CARDIOLOGY CLINIC | Facility: CLINIC | Age: 73
End: 2023-09-08

## 2023-09-08 VITALS
WEIGHT: 220.9 LBS | TEMPERATURE: 98.2 F | BODY MASS INDEX: 35.5 KG/M2 | SYSTOLIC BLOOD PRESSURE: 110 MMHG | DIASTOLIC BLOOD PRESSURE: 70 MMHG | HEIGHT: 66 IN | HEART RATE: 71 BPM | OXYGEN SATURATION: 93 % | RESPIRATION RATE: 16 BRPM

## 2023-09-08 LAB
ANA SER QL IA: NEGATIVE
ANION GAP SERPL CALCULATED.3IONS-SCNC: 6 MMOL/L
BUN SERPL-MCNC: 18 MG/DL (ref 5–25)
CA-I BLD-SCNC: 1.13 MMOL/L (ref 1.12–1.32)
CALCIUM SERPL-MCNC: 8.7 MG/DL (ref 8.4–10.2)
CHLORIDE SERPL-SCNC: 104 MMOL/L (ref 96–108)
CO2 SERPL-SCNC: 26 MMOL/L (ref 21–32)
CREAT SERPL-MCNC: 0.83 MG/DL (ref 0.6–1.3)
DEPRECATED AT III PPP: 104 % OF NORMAL (ref 92–136)
ERYTHROCYTE [DISTWIDTH] IN BLOOD BY AUTOMATED COUNT: 13.3 % (ref 11.6–15.1)
GFR SERPL CREATININE-BSD FRML MDRD: 87 ML/MIN/1.73SQ M
GLUCOSE SERPL-MCNC: 119 MG/DL (ref 65–140)
HCT VFR BLD AUTO: 42.4 % (ref 36.5–49.3)
HGB BLD-MCNC: 13.8 G/DL (ref 12–17)
MAGNESIUM SERPL-MCNC: 1.8 MG/DL (ref 1.9–2.7)
MCH RBC QN AUTO: 31.4 PG (ref 26.8–34.3)
MCHC RBC AUTO-ENTMCNC: 32.5 G/DL (ref 31.4–37.4)
MCV RBC AUTO: 96 FL (ref 82–98)
PHOSPHATE SERPL-MCNC: 3.4 MG/DL (ref 2.3–4.1)
PLATELET # BLD AUTO: 165 THOUSANDS/UL (ref 149–390)
PMV BLD AUTO: 9.4 FL (ref 8.9–12.7)
POTASSIUM SERPL-SCNC: 4 MMOL/L (ref 3.5–5.3)
RBC # BLD AUTO: 4.4 MILLION/UL (ref 3.88–5.62)
SODIUM SERPL-SCNC: 136 MMOL/L (ref 135–147)
WBC # BLD AUTO: 10.98 THOUSAND/UL (ref 4.31–10.16)

## 2023-09-08 PROCEDURE — 85027 COMPLETE CBC AUTOMATED: CPT | Performed by: NURSE PRACTITIONER

## 2023-09-08 PROCEDURE — 99238 HOSP IP/OBS DSCHRG MGMT 30/<: CPT | Performed by: STUDENT IN AN ORGANIZED HEALTH CARE EDUCATION/TRAINING PROGRAM

## 2023-09-08 PROCEDURE — 83735 ASSAY OF MAGNESIUM: CPT | Performed by: NURSE PRACTITIONER

## 2023-09-08 PROCEDURE — 80048 BASIC METABOLIC PNL TOTAL CA: CPT | Performed by: NURSE PRACTITIONER

## 2023-09-08 PROCEDURE — 84100 ASSAY OF PHOSPHORUS: CPT | Performed by: NURSE PRACTITIONER

## 2023-09-08 PROCEDURE — 82330 ASSAY OF CALCIUM: CPT | Performed by: NURSE PRACTITIONER

## 2023-09-08 RX ORDER — HYDROCODONE BITARTRATE AND ACETAMINOPHEN 5; 325 MG/1; MG/1
1 TABLET ORAL ONCE
Status: COMPLETED | OUTPATIENT
Start: 2023-09-08 | End: 2023-09-08

## 2023-09-08 RX ORDER — HYDROCODONE BITARTRATE AND ACETAMINOPHEN 5; 325 MG/1; MG/1
1 TABLET ORAL EVERY 8 HOURS PRN
Qty: 6 TABLET | Refills: 0 | Status: SHIPPED | OUTPATIENT
Start: 2023-09-08 | End: 2023-09-18

## 2023-09-08 RX ORDER — MAGNESIUM SULFATE HEPTAHYDRATE 40 MG/ML
2 INJECTION, SOLUTION INTRAVENOUS ONCE
Status: COMPLETED | OUTPATIENT
Start: 2023-09-08 | End: 2023-09-08

## 2023-09-08 RX ORDER — TRAMADOL HYDROCHLORIDE 50 MG/1
50 TABLET ORAL EVERY 8 HOURS PRN
Qty: 30 TABLET | Refills: 0 | Status: SHIPPED | OUTPATIENT
Start: 2023-09-08 | End: 2023-09-08

## 2023-09-08 RX ADMIN — MAGNESIUM SULFATE HEPTAHYDRATE 2 G: 40 INJECTION, SOLUTION INTRAVENOUS at 07:11

## 2023-09-08 RX ADMIN — DORZOLAMIDE HYDROCHLORIDE AND TIMOLOL MALEATE 1 DROP: 22.3; 6.8 SOLUTION/ DROPS OPHTHALMIC at 08:21

## 2023-09-08 RX ADMIN — HYDROCODONE BITARTRATE AND ACETAMINOPHEN 1 TABLET: 5; 325 TABLET ORAL at 04:52

## 2023-09-08 RX ADMIN — APIXABAN 10 MG: 5 TABLET, FILM COATED ORAL at 08:21

## 2023-09-08 RX ADMIN — TRAMADOL HYDROCHLORIDE 50 MG: 50 TABLET, COATED ORAL at 02:37

## 2023-09-08 RX ADMIN — CHLORHEXIDINE GLUCONATE 15 ML: 1.2 RINSE ORAL at 08:21

## 2023-09-08 RX ADMIN — METHOCARBAMOL TABLETS 500 MG: 500 TABLET, COATED ORAL at 02:37

## 2023-09-08 NOTE — PLAN OF CARE
Problem: MOBILITY - ADULT  Goal: Maintain or return to baseline ADL function  Description: INTERVENTIONS:  -  Assess patient's ability to carry out ADLs; assess patient's baseline for ADL function and identify physical deficits which impact ability to perform ADLs (bathing, care of mouth/teeth, toileting, grooming, dressing, etc.)  - Assess/evaluate cause of self-care deficits   - Assess range of motion  - Assess patient's mobility; develop plan if impaired  - Assess patient's need for assistive devices and provide as appropriate  - Encourage maximum independence but intervene and supervise when necessary  - Involve family in performance of ADLs  - Assess for home care needs following discharge   - Consider OT consult to assist with ADL evaluation and planning for discharge  - Provide patient education as appropriate  Outcome: Progressing  Goal: Maintains/Returns to pre admission functional level  Description: INTERVENTIONS:  - Perform BMAT or MOVE assessment daily.   - Set and communicate daily mobility goal to care team and patient/family/caregiver. - Collaborate with rehabilitation services on mobility goals if consulted  - Perform Range of Motion 2 times a day. - Reposition patient every 2 hours.   - Dangle patient 2 times a day  - Stand patient 2 times a day  - Ambulate patient 2 times a day  - Out of bed to chair 2 times a day   - Out of bed for meals 2 times a day  - Out of bed for toileting  - Record patient progress and toleration of activity level   Outcome: Progressing     Problem: CARDIOVASCULAR - ADULT  Goal: Maintains optimal cardiac output and hemodynamic stability  Description: INTERVENTIONS:  - Monitor I/O, vital signs and rhythm  - Monitor for S/S and trends of decreased cardiac output  - Administer and titrate ordered vasoactive medications to optimize hemodynamic stability  - Assess quality of pulses, skin color and temperature  - Assess for signs of decreased coronary artery perfusion  - Instruct patient to report change in severity of symptoms  Outcome: Progressing  Goal: Absence of cardiac dysrhythmias or at baseline rhythm  Description: INTERVENTIONS:  - Continuous cardiac monitoring, vital signs, obtain 12 lead EKG if ordered  - Administer antiarrhythmic and heart rate control medications as ordered  - Monitor electrolytes and administer replacement therapy as ordered  Outcome: Progressing

## 2023-09-08 NOTE — TELEPHONE ENCOUNTER
----- Message from Dilshad Pina, [address] [facility] sent at 9/7/2023  2:48 PM EDT -----  Regarding: FW: Hospital follow-up    ----- Message -----  From: Cyrilla Boast, MD  Sent: 9/7/2023   2:34 PM EDT  To: Sasha [facility]  Subject: Hospital follow-up                               Please arrange hospital discharge follow-up, 6 to 12 weeks, with me or/NP

## 2023-09-08 NOTE — NURSING NOTE
Patient given all discharge instructions with no further questions noted. Patient also informed of e-prescriptions to be picked up. All belongings verified with patient and sent with patient as well. Patient escorted out of building with this RN via wheelchair.

## 2023-09-08 NOTE — PLAN OF CARE
Problem: MOBILITY - ADULT  Goal: Maintain or return to baseline ADL function  Description: INTERVENTIONS:  -  Assess patient's ability to carry out ADLs; assess patient's baseline for ADL function and identify physical deficits which impact ability to perform ADLs (bathing, care of mouth/teeth, toileting, grooming, dressing, etc.)  - Assess/evaluate cause of self-care deficits   - Assess range of motion  - Assess patient's mobility; develop plan if impaired  - Assess patient's need for assistive devices and provide as appropriate  - Encourage maximum independence but intervene and supervise when necessary  - Involve family in performance of ADLs  - Assess for home care needs following discharge   - Consider OT consult to assist with ADL evaluation and planning for discharge  - Provide patient education as appropriate  9/8/2023 1126 by Torrey David RN  Outcome: Adequate for Discharge  9/8/2023 0720 by Torrey David RN  Outcome: Progressing  Goal: Maintains/Returns to pre admission functional level  Description: INTERVENTIONS:  - Perform BMAT or MOVE assessment daily.   - Set and communicate daily mobility goal to care team and patient/family/caregiver. - Collaborate with rehabilitation services on mobility goals if consulted  - Perform Range of Motion 2 times a day. - Reposition patient every 2 hours.   - Dangle patient 2 times a day  - Stand patient 2 times a day  - Ambulate patient 2 times a day  - Out of bed to chair 2 times a day   - Out of bed for meals 2 times a day  - Out of bed for toileting  - Record patient progress and toleration of activity level   9/8/2023 1126 by Torrey David RN  Outcome: Adequate for Discharge  9/8/2023 0720 by Torrey David RN  Outcome: Progressing     Problem: MOBILITY - ADULT  Goal: Maintains/Returns to pre admission functional level  Description: INTERVENTIONS:  - Perform BMAT or MOVE assessment daily.   - Set and communicate daily mobility goal to care team and patient/family/caregiver. - Collaborate with rehabilitation services on mobility goals if consulted  - Perform Range of Motion 2 times a day. - Reposition patient every 2 hours.   - Dangle patient 2 times a day  - Stand patient 2 times a day  - Ambulate patient 2 times a day  - Out of bed to chair 2 times a day   - Out of bed for meals 2 times a day  - Out of bed for toileting  - Record patient progress and toleration of activity level   9/8/2023 1126 by Valerie Garza RN  Outcome: Adequate for Discharge  9/8/2023 0720 by Valerie Garza RN  Outcome: Progressing     Problem: CARDIOVASCULAR - ADULT  Goal: Maintains optimal cardiac output and hemodynamic stability  Description: INTERVENTIONS:  - Monitor I/O, vital signs and rhythm  - Monitor for S/S and trends of decreased cardiac output  - Administer and titrate ordered vasoactive medications to optimize hemodynamic stability  - Assess quality of pulses, skin color and temperature  - Assess for signs of decreased coronary artery perfusion  - Instruct patient to report change in severity of symptoms  9/8/2023 1126 by Valerie Garza RN  Outcome: Adequate for Discharge  9/8/2023 0720 by Valerie Garza RN  Outcome: Progressing  Goal: Absence of cardiac dysrhythmias or at baseline rhythm  Description: INTERVENTIONS:  - Continuous cardiac monitoring, vital signs, obtain 12 lead EKG if ordered  - Administer antiarrhythmic and heart rate control medications as ordered  - Monitor electrolytes and administer replacement therapy as ordered  9/8/2023 1126 by Valerie Garza RN  Outcome: Adequate for Discharge  9/8/2023 0720 by Valerie Garza RN  Outcome: Progressing

## 2023-09-09 LAB
PROT S ACT/NOR PPP: 109 % (ref 61–136)
PROT S PPP-ACNC: 115 % (ref 60–150)

## 2023-09-10 LAB
APTT HEX PL PPP: 3 SEC (ref 0–11)
APTT SCREEN TO CONFIRM RATIO: 0.95 RATIO (ref 0–1.34)
APTT-LA IMM 4:1 NP PPP: 49 SEC (ref 0–40.5)
B2 GLYCOPROT1 IGA SERPL IA-ACNC: 2.3
B2 GLYCOPROT1 IGG SERPL IA-ACNC: <0.8
B2 GLYCOPROT1 IGM SERPL IA-ACNC: 5.6
CARDIOLIPIN IGA SER IA-ACNC: 3.4
CARDIOLIPIN IGG SER IA-ACNC: 2.8
CARDIOLIPIN IGM SER IA-ACNC: 1.3
CONFIRM APTT/NORMAL: 47.4 SEC (ref 0–47.6)
DRVVT IMM 1:2 NP PPP: 43.2 SEC (ref 0–40.4)
DRVVT SCREEN TO CONFIRM RATIO: 1.1 RATIO (ref 0.8–1.2)
LA PPP-IMP: ABNORMAL
SCREEN APTT: 53.3 SEC (ref 0–43.5)
SCREEN DRVVT: 52.7 SEC (ref 0–47)
THROMBIN TIME: >150 SEC (ref 0–23)
TT IMM NP PPP: >150 SEC (ref 0–23)
TT P HPASE PPP: 16.1 SEC (ref 0–23)

## 2023-09-11 ENCOUNTER — TELEPHONE (OUTPATIENT)
Dept: PULMONOLOGY | Facility: CLINIC | Age: 73
End: 2023-09-11

## 2023-09-11 LAB
PROT C AG ACT/NOR PPP IA: >150 % OF NORMAL (ref 60–150)
PROT S ACT/NOR PPP: 98 % (ref 71–117)

## 2023-09-11 NOTE — TELEPHONE ENCOUNTER
Patient called to make an apportionment for a HFU at the [facility]. The only opening for the next few months would be tomorrow. Patient said he didn't want to take that appointment for tomorrow because his testing results would not be in yet. I offered to schedule the patient at a different office to be seen, but he declined. Not sure if there is anything else that we can do for the patient. Please advise.

## 2023-09-14 LAB
F5 GENE MUT ANL BLD/T: NORMAL
Lab: NORMAL

## 2023-09-15 LAB
F2 GENE MUT ANL BLD/T: NORMAL
Lab: NORMAL

## 2023-09-22 RX ORDER — SACCHAROMYCES BOULARDII 250 MG
CAPSULE ORAL
COMMUNITY

## 2023-09-22 RX ORDER — METHOCARBAMOL 750 MG/1
1 TABLET, FILM COATED ORAL 3 TIMES DAILY
COMMUNITY

## 2023-09-22 RX ORDER — DIMENHYDRINATE 50 MG
TABLET ORAL
COMMUNITY

## 2023-09-22 RX ORDER — CLINDAMYCIN HYDROCHLORIDE 150 MG/1
CAPSULE ORAL
COMMUNITY

## 2023-09-22 RX ORDER — CELECOXIB 200 MG/1
CAPSULE ORAL
COMMUNITY

## 2023-09-26 ENCOUNTER — OFFICE VISIT (OUTPATIENT)
Dept: PULMONOLOGY | Facility: CLINIC | Age: 73
End: 2023-09-26
Payer: MEDICARE

## 2023-09-26 VITALS
WEIGHT: 224.6 LBS | SYSTOLIC BLOOD PRESSURE: 130 MMHG | OXYGEN SATURATION: 97 % | BODY MASS INDEX: 36.1 KG/M2 | TEMPERATURE: 97.5 F | DIASTOLIC BLOOD PRESSURE: 72 MMHG | HEART RATE: 85 BPM | RESPIRATION RATE: 20 BRPM | HEIGHT: 66 IN

## 2023-09-26 DIAGNOSIS — I26.99 PULMONARY EMBOLISM (HCC): Primary | ICD-10-CM

## 2023-09-26 DIAGNOSIS — E66.01 OBESITY, MORBID (HCC): ICD-10-CM

## 2023-09-26 PROCEDURE — 99214 OFFICE O/P EST MOD 30 MIN: CPT | Performed by: INTERNAL MEDICINE

## 2023-09-26 NOTE — PROGRESS NOTES
Pulmonary Follow Up Note   Artem Lemon. 68 y.o. male MRN: [id number]  9/26/2023    Assessment:  Acute pulmonary embolism  · Submassive, low risk/moderate to large clot burden  · Had normal BNP/troponin, mild RV dilation no signs of systolic dysfunction RA pressure of 3  · Remained HD stable during hospitalization, treated with unfractionated heparin and discharged on Eliquis  · Seemed to be unprovoked PE, he was not up-to-date on his cancer screening  · Other contributing risk factors are class II obesity/. Of immobilization/sedentary due to lumbar pain/radiculopathy  · Hypercoagulable work-up completed during hospitalization however was on heparin, see below at least negative for prothrombin gene mutation and factor V Leiden  · No family history of hypercoagulable state at young age    Plan:  · Counseled extensively about all the above mentioned details  · Continue apixaban 5 mg every 12 hours, ordered refills  · Given the persistent risk factors, high risk to remain sedentary/or more immobilization due to severe back pain/currently on therapy by orthopedic/spinal surgery team  · At this point, I recommended lifelong therapy/at least 6 months and risk factor reassessment at next visit  · Still ongoing cancer screening. PCP, recently established care with them Dr. [facility]      Obstructive sleep apnea  · Reported Dx 15 years ago  · Could not tolerate the mask, some claustrophobia  · Denies unrefreshing sleep, or daytime sleepiness  · Would like to hold off for now further testing or therapy given the lack of symptoms      Left renal lesion  · CT renal showed small left renal lesion, equivocal imaging however MRI recommended for definitive characterization  · May not be able to obtain MRI due to procedures for back radiculopathy  · Follow-up with PCP/possible nephrology referral vs ultrasound    Return in about 4 months (around 1/26/2024).     History of Present Illness     Follow up for: HFU/acute pulmonary embolism    Background:  68 y.o. male with a h/o class II obesity, IRENE/CPAP, chronic low back pain, cervicalgia, lumbar spinal stimulator     Presented to the ED 9/6 with right flank pain for few days, muscle spasm around the same area. Reported some ambulatory dysfunction s/p right knee replacement failure. A CT PE showed distal right PA pulmonary embolism, small filling defects at the lingular branches. Also noted peripheral GGO at the RLL.    He was HD stable, Pesi score of 82, called PERT team at the beginning due to elevated RV/LV ratio. Felt stable/submassive pulmonary embolism admitted to ICU to for close monitoring. Started on unfractionated heparin, no intervention indicated. TTE showed mildly dilated RV/normal systolic function, estimated RA pressure 3 mmHg/normal IVC. Seen by pulmonary, thought to be unprovoked PE, not up-to-date on his cancer screening also contributing factors of class II obesity, not active at home due to lumbar pain/radiculopathy. Commended at least 6 months therapy. CT renal showed small left renal lesion, equivocal images recommended MRI for definite characterization. Thrombosis panel: Prothrombin gene mutation not detected. Factor V Leiden mutation/not detected. Normal protein C/S activities, not deficient. Antithrombin III activity normal.    Interval History  Since discharge, doing well. Trying to be active, independent in ADLs able to do light housework, and yard work. Main limitations due to back pain. Recently had back therapy adjusted. Started to see PCP, planning for EGD and cancer screening. No significant change in weight or appetite. No symptoms/signs of intolerance to the Eliquis, no signs of bleeding.       Review of Systems  As per hpi, all other systems reviewed and were negative    Studies:    Imaging and other studies: I have personally reviewed pertinent films in PACS      Pulmonary function testing:   None on file    EKG, Pathology, and Other Studies: I have personally reviewed pertinent reports. Past medical, surgical, social and family histories reviewed. Medications/Allergies: Reviewed      Vitals: Blood pressure 130/72, pulse 85, temperature 97.5 °F (36.4 °C), resp. rate 20, height 5' 6" (1.676 m), weight 102 kg (224 lb 9.6 oz), SpO2 97 %. Body mass index is 36.25 kg/m². Oxygen Therapy  SpO2: 97 %      Physical Exam  Body mass index is 36.25 kg/m². Gen: not in acute distress, obese  Neck/Eyes: supple, no adenopathy, PERRL  Ear: normal appearance, no significant hearing impairment  Nose:  normal nasal mucosa, no drainage  Mouth:  unremarkable/normal appearance of lips, teeth and gums  Oropharynx: mucosa is moist, no focal lesions or erythema  Salivary glands: soft nontender  Chest: normal respiratory efforts, clear breath sounds bilaterally  CV: RRR, normal S1/S2, no murmurs appreciated, no edema  Abdomen: soft, non tender  Extremities:  No observed deformity   Skin: unremarkable  Neuro: AAO X3, no focal motor deficit        Labs:  Lab Results   Component Value Date    WBC 10.98 (H) 09/08/2023    HGB 13.8 09/08/2023    HCT 42.4 09/08/2023    MCV 96 09/08/2023     09/08/2023     Lab Results   Component Value Date    CALCIUM 8.7 09/08/2023    K 4.0 09/08/2023    CO2 26 09/08/2023     09/08/2023    BUN 18 09/08/2023    CREATININE 0.83 09/08/2023     No results found for: "IGE"  Lab Results   Component Value Date    ALT 22 09/06/2023    AST 14 09/06/2023    ALKPHOS 54 09/06/2023           Portions of the record may have been created with voice recognition software. Occasional wrong word or "sound a like" substitutions may have occurred due to the inherent limitations of voice recognition software. Read the chart carefully and recognize, using context, where substitutions have occurred    Leobardo Tinoco M.D.   [facility]

## 2023-09-29 ENCOUNTER — HOSPITAL ENCOUNTER (OUTPATIENT)
Dept: RADIOLOGY | Facility: HOSPITAL | Age: 73
End: 2023-09-29
Payer: MEDICARE

## 2023-09-29 DIAGNOSIS — R13.10 DYSPHAGIA, UNSPECIFIED TYPE: ICD-10-CM

## 2023-09-29 PROCEDURE — 74240 X-RAY XM UPR GI TRC 1CNTRST: CPT

## 2024-01-24 ENCOUNTER — APPOINTMENT (OUTPATIENT)
Dept: LAB | Facility: HOSPITAL | Age: 74
End: 2024-01-24
Payer: MEDICARE

## 2024-01-24 ENCOUNTER — HOSPITAL ENCOUNTER (OUTPATIENT)
Dept: RADIOLOGY | Facility: HOSPITAL | Age: 74
Discharge: HOME | End: 2024-01-24
Payer: MEDICARE

## 2024-01-24 ENCOUNTER — OFFICE VISIT (OUTPATIENT)
Dept: LAB | Facility: HOSPITAL | Age: 74
End: 2024-01-24
Payer: MEDICARE

## 2024-01-24 DIAGNOSIS — M54.16 LUMBAR RADICULOPATHY: ICD-10-CM

## 2024-01-24 DIAGNOSIS — M96.1 CERVICAL POSTLAMINECTOMY SYNDROME: ICD-10-CM

## 2024-01-24 DIAGNOSIS — M51.36 DDD (DEGENERATIVE DISC DISEASE), LUMBAR: ICD-10-CM

## 2024-01-24 DIAGNOSIS — Z01.818 PRE-OP TESTING: ICD-10-CM

## 2024-01-24 DIAGNOSIS — G89.4 CHRONIC PAIN SYNDROME: ICD-10-CM

## 2024-01-24 DIAGNOSIS — Z79.01 CURRENT USE OF LONG TERM ANTICOAGULATION: ICD-10-CM

## 2024-01-24 LAB
ALBUMIN SERPL BCP-MCNC: 4.5 G/DL (ref 3.5–5)
ALP SERPL-CCNC: 45 U/L (ref 34–104)
ALT SERPL W P-5'-P-CCNC: 18 U/L (ref 7–52)
ANION GAP SERPL CALCULATED.3IONS-SCNC: 6 MMOL/L
AST SERPL W P-5'-P-CCNC: 17 U/L (ref 13–39)
BASOPHILS # BLD AUTO: 0.03 THOUSANDS/ÂΜL (ref 0–0.1)
BASOPHILS NFR BLD AUTO: 0 % (ref 0–1)
BILIRUB SERPL-MCNC: 0.47 MG/DL (ref 0.2–1)
BUN SERPL-MCNC: 22 MG/DL (ref 5–25)
CALCIUM SERPL-MCNC: 10 MG/DL (ref 8.4–10.2)
CHLORIDE SERPL-SCNC: 105 MMOL/L (ref 96–108)
CO2 SERPL-SCNC: 26 MMOL/L (ref 21–32)
CREAT SERPL-MCNC: 1.13 MG/DL (ref 0.6–1.3)
EOSINOPHIL # BLD AUTO: 0.01 THOUSAND/ÂΜL (ref 0–0.61)
EOSINOPHIL NFR BLD AUTO: 0 % (ref 0–6)
ERYTHROCYTE [DISTWIDTH] IN BLOOD BY AUTOMATED COUNT: 13.5 % (ref 11.6–15.1)
GFR SERPL CREATININE-BSD FRML MDRD: 64 ML/MIN/1.73SQ M
GLUCOSE P FAST SERPL-MCNC: 109 MG/DL (ref 65–99)
HCT VFR BLD AUTO: 49.9 % (ref 36.5–49.3)
HGB BLD-MCNC: 16.1 G/DL (ref 12–17)
IMM GRANULOCYTES # BLD AUTO: 0.02 THOUSAND/UL (ref 0–0.2)
IMM GRANULOCYTES NFR BLD AUTO: 0 % (ref 0–2)
INR PPP: 1.02 (ref 0.84–1.19)
LYMPHOCYTES # BLD AUTO: 0.8 THOUSANDS/ÂΜL (ref 0.6–4.47)
LYMPHOCYTES NFR BLD AUTO: 12 % (ref 14–44)
MCH RBC QN AUTO: 29 PG (ref 26.8–34.3)
MCHC RBC AUTO-ENTMCNC: 32.3 G/DL (ref 31.4–37.4)
MCV RBC AUTO: 90 FL (ref 82–98)
MONOCYTES # BLD AUTO: 0.27 THOUSAND/ÂΜL (ref 0.17–1.22)
MONOCYTES NFR BLD AUTO: 4 % (ref 4–12)
NEUTROPHILS # BLD AUTO: 5.66 THOUSANDS/ÂΜL (ref 1.85–7.62)
NEUTS SEG NFR BLD AUTO: 84 % (ref 43–75)
NRBC BLD AUTO-RTO: 0 /100 WBCS
PLATELET # BLD AUTO: 227 THOUSANDS/UL (ref 149–390)
PMV BLD AUTO: 9.6 FL (ref 8.9–12.7)
POTASSIUM SERPL-SCNC: 4.7 MMOL/L (ref 3.5–5.3)
PROT SERPL-MCNC: 7.1 G/DL (ref 6.4–8.4)
PROTHROMBIN TIME: 13.7 SECONDS (ref 11.6–14.5)
RBC # BLD AUTO: 5.56 MILLION/UL (ref 3.88–5.62)
SODIUM SERPL-SCNC: 137 MMOL/L (ref 135–147)
WBC # BLD AUTO: 6.79 THOUSAND/UL (ref 4.31–10.16)

## 2024-01-24 PROCEDURE — 80053 COMPREHEN METABOLIC PANEL: CPT

## 2024-01-24 PROCEDURE — 36415 COLL VENOUS BLD VENIPUNCTURE: CPT

## 2024-01-24 PROCEDURE — 85610 PROTHROMBIN TIME: CPT

## 2024-01-24 PROCEDURE — 93005 ELECTROCARDIOGRAM TRACING: CPT

## 2024-01-24 PROCEDURE — 85025 COMPLETE CBC W/AUTO DIFF WBC: CPT

## 2024-01-24 PROCEDURE — 71046 X-RAY EXAM CHEST 2 VIEWS: CPT

## 2024-01-25 LAB
ATRIAL RATE: 71 BPM
P AXIS: 30 DEGREES
PR INTERVAL: 126 MS
QRS AXIS: -3 DEGREES
QRSD INTERVAL: 82 MS
QT INTERVAL: 410 MS
QTC INTERVAL: 445 MS
T WAVE AXIS: 38 DEGREES
VENTRICULAR RATE: 71 BPM

## 2024-03-08 RX ORDER — PREDNISONE 10 MG/1
TABLET ORAL
COMMUNITY
Start: 2024-01-09

## 2024-03-14 ENCOUNTER — OFFICE VISIT (OUTPATIENT)
Dept: PULMONOLOGY | Facility: CLINIC | Age: 74
End: 2024-03-14

## 2024-03-14 VITALS
HEART RATE: 75 BPM | SYSTOLIC BLOOD PRESSURE: 128 MMHG | DIASTOLIC BLOOD PRESSURE: 78 MMHG | BODY MASS INDEX: 39.21 KG/M2 | WEIGHT: 244 LBS | TEMPERATURE: 96.6 F | OXYGEN SATURATION: 95 % | HEIGHT: 66 IN

## 2024-03-14 DIAGNOSIS — I26.99 PULMONARY EMBOLISM (HCC): ICD-10-CM

## 2024-03-14 RX ORDER — OXYCODONE HYDROCHLORIDE AND ACETAMINOPHEN 5; 325 MG/1; MG/1
1 TABLET ORAL EVERY 4 HOURS PRN
COMMUNITY
Start: 2024-02-08

## 2024-03-14 NOTE — PROGRESS NOTES
Pulmonary Follow Up Note   Bjorn Goldberg Jr. 74 y.o. male MRN: [id number]  3/14/2024    Assessment:  Pulmonary embolism  In 9/2023, hospitalized submassive, low risk moderate to large clot burden bilaterally  No signs of RV strain  HD stable/treated with heparin and then discharged on Eliquis  Suspected unprovoked, no history of malignancy/up-to-date on screening  Large contribution from persistent risk factor: Class II/III obesity, prolonged periods of immobilization/inactivity and sedentary due to lumbar radiculopathy/pain  No family history of hypercoagulable state at young age, negative thrombosis panel including prothrombin gene mutation/factor V Leiden mutation    Plan:  Continue apixaban 5 mg q12, no signs of intolerance/or bleeding  Given the persistent risk factors, still recommend lifelong therapy    Obstructive sleep apnea  Formal diagnosis 15 years ago  Would like to hold off further testing      Return in about 6 months (around 9/14/2024).    History of Present Illness     Follow up for: pulmonary embolism     Background:  74 y.o. male with a h/o class II obesity, IRENE/CPAP, chronic low back pain, cervicalgia, lumbar spinal stimulator     Presented to the ED 9/6 with right flank pain for few days, muscle spasm around the same area.  Reported some ambulatory dysfunction s/p right knee replacement failure.  A CT PE showed distal right PA pulmonary embolism, small filling defects at the lingular branches.  Also noted peripheral GGO at the RLL.     He was HD stable, Pesi score of 82, called PERT team at the beginning due to elevated RV/LV ratio.  Felt stable/submassive pulmonary embolism admitted to ICU to for close monitoring.  Started on unfractionated heparin, no intervention indicated.  TTE showed mildly dilated RV/normal systolic function, estimated RA pressure 3 mmHg/normal IVC.  Seen by pulmonary, thought to be unprovoked PE, not up-to-date on his cancer screening also contributing factors of  "class II obesity, not active at home due to lumbar pain/radiculopathy.  Recommended at least 6 months therapy.     CT renal showed small left renal lesion, equivocal images recommended MRI for definite characterization.     Thrombosis panel: Prothrombin gene mutation not detected.  Factor V Leiden mutation/not detected.  Normal protein C/S activities, not deficient.  Antithrombin III activity normal.    9/2023 visit-given the persistent risk factors/high risk for immobilization/send being sedentary and chronic back problems recommended lifelong therapy.  Not interested in sleep study    Interval History  Since last seen, no major changes.  No recurrence of chest discomfort/or dyspnea.  Still dealing with pain/issues, not better activity compared to before.  Able to do ADLs, light house walking but mainly sedentary.  Gained some weight.  Recently completed GI workup, negative screening scopes.      Review of Systems  As per hpi, all other systems reviewed and were negative    Studies:    Imaging and other studies: I have personally reviewed pertinent films in PACS      Pulmonary function testing:   None on file    EKG, Pathology, and Other Studies: I have personally reviewed pertinent reports.        Past medical, surgical, social and family histories reviewed.      Medications/Allergies: Reviewed      Vitals: Blood pressure 128/78, pulse 75, temperature (!) 96.6 °F (35.9 °C), height 5' 6\" (1.676 m), weight 111 kg (244 lb), SpO2 95%. Body mass index is 39.38 kg/m². Oxygen Therapy  SpO2: 95 %      Physical Exam  Body mass index is 39.38 kg/m².   Gen: not in acute distress,   Neck/Eyes: supple, no adenopathy, PERRL  Ear: normal appearance, no significant hearing impairment  Nose:  normal nasal mucosa, no drainage  Mouth:  unremarkable/normal appearance of lips, teeth and gums  Oropharynx: mucosa is moist, no focal lesions or erythema  Salivary glands: soft nontender  Chest: normal respiratory efforts, clear breath " "sounds bilaterally  CV: RRR, no accentuated second heart sounds, no murmurs appreciated, no edema  Abdomen: soft, non tender  Extremities:  No observed deformity   Skin: unremarkable  Neuro: AAO X3, no focal motor deficit        Labs:  Lab Results   Component Value Date    WBC 6.79 01/24/2024    HGB 16.1 01/24/2024    HCT 49.9 (H) 01/24/2024    MCV 90 01/24/2024     01/24/2024     Lab Results   Component Value Date    CALCIUM 10.0 01/24/2024    K 4.7 01/24/2024    CO2 26 01/24/2024     01/24/2024    BUN 22 01/24/2024    CREATININE 1.13 01/24/2024     No results found for: \"IGE\"  Lab Results   Component Value Date    ALT 18 01/24/2024    AST 17 01/24/2024    ALKPHOS 45 01/24/2024           Portions of the record may have been created with voice recognition software.  Occasional wrong word or \"sound a like\" substitutions may have occurred due to the inherent limitations of voice recognition software.  Read the chart carefully and recognize, using context, where substitutions have occurred    Claire Terrazas M.D.  [facility]  "

## 2024-04-15 ENCOUNTER — APPOINTMENT (OUTPATIENT)
Dept: LAB | Facility: HOSPITAL | Age: 74
End: 2024-04-15
Payer: MEDICARE

## 2024-04-15 DIAGNOSIS — E13.69 OTHER SPECIFIED DIABETES MELLITUS WITH OTHER SPECIFIED COMPLICATION, UNSPECIFIED WHETHER LONG TERM INSULIN USE (HCC): ICD-10-CM

## 2024-04-15 DIAGNOSIS — N18.1 CHRONIC KIDNEY DISEASE, STAGE I: ICD-10-CM

## 2024-04-15 DIAGNOSIS — K76.0 FATTY METAMORPHOSIS OF LIVER: ICD-10-CM

## 2024-04-15 DIAGNOSIS — E78.2 MIXED HYPERLIPIDEMIA: ICD-10-CM

## 2024-04-15 LAB
ALT SERPL W P-5'-P-CCNC: 13 U/L (ref 7–52)
ANION GAP SERPL CALCULATED.3IONS-SCNC: 5 MMOL/L (ref 4–13)
BUN SERPL-MCNC: 16 MG/DL (ref 5–25)
CALCIUM SERPL-MCNC: 9.4 MG/DL (ref 8.4–10.2)
CHLORIDE SERPL-SCNC: 108 MMOL/L (ref 96–108)
CHOLEST SERPL-MCNC: 171 MG/DL
CO2 SERPL-SCNC: 28 MMOL/L (ref 21–32)
CREAT SERPL-MCNC: 1.21 MG/DL (ref 0.6–1.3)
EST. AVERAGE GLUCOSE BLD GHB EST-MCNC: 105 MG/DL
GFR SERPL CREATININE-BSD FRML MDRD: 59 ML/MIN/1.73SQ M
GLUCOSE P FAST SERPL-MCNC: 92 MG/DL (ref 65–99)
HBA1C MFR BLD: 5.3 %
HDLC SERPL-MCNC: 44 MG/DL
LDLC SERPL CALC-MCNC: 102 MG/DL (ref 0–100)
POTASSIUM SERPL-SCNC: 4.7 MMOL/L (ref 3.5–5.3)
SODIUM SERPL-SCNC: 141 MMOL/L (ref 135–147)
TRIGL SERPL-MCNC: 127 MG/DL

## 2024-04-15 PROCEDURE — 84460 ALANINE AMINO (ALT) (SGPT): CPT

## 2024-04-15 PROCEDURE — 36415 COLL VENOUS BLD VENIPUNCTURE: CPT

## 2024-04-15 PROCEDURE — 80061 LIPID PANEL: CPT

## 2024-04-15 PROCEDURE — 80048 BASIC METABOLIC PNL TOTAL CA: CPT

## 2024-04-15 PROCEDURE — 83036 HEMOGLOBIN GLYCOSYLATED A1C: CPT

## 2024-06-04 ENCOUNTER — APPOINTMENT (EMERGENCY)
Dept: CT IMAGING | Facility: HOSPITAL | Age: 74
End: 2024-06-04
Payer: MEDICARE

## 2024-06-04 ENCOUNTER — HOSPITAL ENCOUNTER (EMERGENCY)
Facility: HOSPITAL | Age: 74
Discharge: HOME | End: 2024-06-04
Attending: EMERGENCY MEDICINE
Payer: MEDICARE

## 2024-06-04 VITALS
BODY MASS INDEX: 39.5 KG/M2 | TEMPERATURE: 97.7 F | WEIGHT: 245.8 LBS | DIASTOLIC BLOOD PRESSURE: 73 MMHG | OXYGEN SATURATION: 95 % | SYSTOLIC BLOOD PRESSURE: 128 MMHG | HEIGHT: 66 IN | HEART RATE: 62 BPM | RESPIRATION RATE: 16 BRPM

## 2024-06-04 DIAGNOSIS — R10.9 ABDOMINAL PAIN: Primary | ICD-10-CM

## 2024-06-04 LAB
ALBUMIN SERPL BCP-MCNC: 4.2 G/DL (ref 3.5–5)
ALP SERPL-CCNC: 54 U/L (ref 34–104)
ALT SERPL W P-5'-P-CCNC: 17 U/L (ref 7–52)
ANION GAP SERPL CALCULATED.3IONS-SCNC: 6 MMOL/L (ref 4–13)
AST SERPL W P-5'-P-CCNC: 18 U/L (ref 13–39)
BASOPHILS # BLD AUTO: 0.04 THOUSANDS/ÂΜL (ref 0–0.1)
BASOPHILS NFR BLD AUTO: 1 % (ref 0–1)
BILIRUB SERPL-MCNC: 0.51 MG/DL (ref 0.2–1)
BILIRUB UR QL STRIP: NEGATIVE
BUN SERPL-MCNC: 20 MG/DL (ref 5–25)
CALCIUM SERPL-MCNC: 10.1 MG/DL (ref 8.4–10.2)
CHLORIDE SERPL-SCNC: 106 MMOL/L (ref 96–108)
CLARITY UR: CLEAR
CO2 SERPL-SCNC: 27 MMOL/L (ref 21–32)
COLOR UR: YELLOW
CREAT SERPL-MCNC: 0.99 MG/DL (ref 0.6–1.3)
EOSINOPHIL # BLD AUTO: 0.1 THOUSAND/ÂΜL (ref 0–0.61)
EOSINOPHIL NFR BLD AUTO: 1 % (ref 0–6)
ERYTHROCYTE [DISTWIDTH] IN BLOOD BY AUTOMATED COUNT: 13 % (ref 11.6–15.1)
GFR SERPL CREATININE-BSD FRML MDRD: 75 ML/MIN/1.73SQ M
GLUCOSE SERPL-MCNC: 129 MG/DL (ref 65–140)
GLUCOSE UR STRIP-MCNC: NEGATIVE MG/DL
HCT VFR BLD AUTO: 47.7 % (ref 36.5–49.3)
HGB BLD-MCNC: 15.8 G/DL (ref 12–17)
HGB UR QL STRIP.AUTO: NEGATIVE
IMM GRANULOCYTES # BLD AUTO: 0.02 THOUSAND/UL (ref 0–0.2)
IMM GRANULOCYTES NFR BLD AUTO: 0 % (ref 0–2)
KETONES UR STRIP-MCNC: NEGATIVE MG/DL
LEUKOCYTE ESTERASE UR QL STRIP: NEGATIVE
LIPASE SERPL-CCNC: 40 U/L (ref 11–82)
LYMPHOCYTES # BLD AUTO: 1.66 THOUSANDS/ÂΜL (ref 0.6–4.47)
LYMPHOCYTES NFR BLD AUTO: 23 % (ref 14–44)
MCH RBC QN AUTO: 29.6 PG (ref 26.8–34.3)
MCHC RBC AUTO-ENTMCNC: 33.1 G/DL (ref 31.4–37.4)
MCV RBC AUTO: 90 FL (ref 82–98)
MONOCYTES # BLD AUTO: 0.63 THOUSAND/ÂΜL (ref 0.17–1.22)
MONOCYTES NFR BLD AUTO: 9 % (ref 4–12)
NEUTROPHILS # BLD AUTO: 4.74 THOUSANDS/ÂΜL (ref 1.85–7.62)
NEUTS SEG NFR BLD AUTO: 66 % (ref 43–75)
NITRITE UR QL STRIP: NEGATIVE
NRBC BLD AUTO-RTO: 0 /100 WBCS
PH UR STRIP.AUTO: 5.5 [PH]
PLATELET # BLD AUTO: 207 THOUSANDS/UL (ref 149–390)
PMV BLD AUTO: 9.8 FL (ref 8.9–12.7)
POTASSIUM SERPL-SCNC: 4.1 MMOL/L (ref 3.5–5.3)
PROT SERPL-MCNC: 7.1 G/DL (ref 6.4–8.4)
PROT UR STRIP-MCNC: NEGATIVE MG/DL
RBC # BLD AUTO: 5.33 MILLION/UL (ref 3.88–5.62)
SODIUM SERPL-SCNC: 139 MMOL/L (ref 135–147)
SP GR UR STRIP.AUTO: 1.01 (ref 1–1.03)
UROBILINOGEN UR QL STRIP.AUTO: 0.2 E.U./DL
WBC # BLD AUTO: 7.19 THOUSAND/UL (ref 4.31–10.16)

## 2024-06-04 PROCEDURE — 85025 COMPLETE CBC W/AUTO DIFF WBC: CPT | Performed by: EMERGENCY MEDICINE

## 2024-06-04 PROCEDURE — 81003 URINALYSIS AUTO W/O SCOPE: CPT | Performed by: EMERGENCY MEDICINE

## 2024-06-04 PROCEDURE — 80053 COMPREHEN METABOLIC PANEL: CPT | Performed by: EMERGENCY MEDICINE

## 2024-06-04 PROCEDURE — 99285 EMERGENCY DEPT VISIT HI MDM: CPT | Performed by: EMERGENCY MEDICINE

## 2024-06-04 PROCEDURE — 96361 HYDRATE IV INFUSION ADD-ON: CPT

## 2024-06-04 PROCEDURE — 83690 ASSAY OF LIPASE: CPT | Performed by: EMERGENCY MEDICINE

## 2024-06-04 PROCEDURE — 99284 EMERGENCY DEPT VISIT MOD MDM: CPT

## 2024-06-04 PROCEDURE — 96375 TX/PRO/DX INJ NEW DRUG ADDON: CPT

## 2024-06-04 PROCEDURE — 36415 COLL VENOUS BLD VENIPUNCTURE: CPT | Performed by: EMERGENCY MEDICINE

## 2024-06-04 PROCEDURE — 74177 CT ABD & PELVIS W/CONTRAST: CPT

## 2024-06-04 PROCEDURE — 96374 THER/PROPH/DIAG INJ IV PUSH: CPT

## 2024-06-04 RX ORDER — MORPHINE SULFATE 4 MG/ML
4 INJECTION, SOLUTION INTRAMUSCULAR; INTRAVENOUS ONCE
Status: COMPLETED | OUTPATIENT
Start: 2024-06-04 | End: 2024-06-04

## 2024-06-04 RX ORDER — FAMOTIDINE 20 MG/1
20 TABLET, FILM COATED ORAL DAILY
Qty: 30 TABLET | Refills: 0 | Status: SHIPPED | OUTPATIENT
Start: 2024-06-04

## 2024-06-04 RX ORDER — ONDANSETRON 4 MG/1
4 TABLET, FILM COATED ORAL EVERY 6 HOURS
Qty: 20 TABLET | Refills: 0 | Status: SHIPPED | OUTPATIENT
Start: 2024-06-04

## 2024-06-04 RX ORDER — HYDROMORPHONE HCL/PF 1 MG/ML
1 SYRINGE (ML) INJECTION ONCE
Status: COMPLETED | OUTPATIENT
Start: 2024-06-04 | End: 2024-06-04

## 2024-06-04 RX ORDER — DICYCLOMINE HCL 20 MG
20 TABLET ORAL 2 TIMES DAILY
Qty: 20 TABLET | Refills: 0 | Status: SHIPPED | OUTPATIENT
Start: 2024-06-04

## 2024-06-04 RX ORDER — ONDANSETRON 2 MG/ML
4 INJECTION INTRAMUSCULAR; INTRAVENOUS ONCE
Status: COMPLETED | OUTPATIENT
Start: 2024-06-04 | End: 2024-06-04

## 2024-06-04 RX ADMIN — HYDROMORPHONE HYDROCHLORIDE 1 MG: 1 INJECTION, SOLUTION INTRAMUSCULAR; INTRAVENOUS; SUBCUTANEOUS at 19:39

## 2024-06-04 RX ADMIN — IOHEXOL 100 ML: 350 INJECTION, SOLUTION INTRAVENOUS at 20:29

## 2024-06-04 RX ADMIN — SODIUM CHLORIDE 1000 ML: 0.9 INJECTION, SOLUTION INTRAVENOUS at 19:38

## 2024-06-04 RX ADMIN — ONDANSETRON 4 MG: 2 INJECTION INTRAMUSCULAR; INTRAVENOUS at 19:11

## 2024-06-04 RX ADMIN — MORPHINE SULFATE 4 MG: 4 INJECTION INTRAVENOUS at 19:12

## 2024-06-04 NOTE — ED PROVIDER NOTES
History  Chief Complaint   Patient presents with    Abdominal Cramping     Patient states he ate dinner at 1645 (orange, milk, ravioli's) and is now having severe abdominal and back cramping.      This is a 74-year-old male presenting to the ED for evaluation of severe abdominal pain.  Patient states that he ate dinner and immediately after he began having severe abdominal pain and cramping.  He denies any vomiting or diarrhea and states that he has not been passing flatus.  Patient also denies any history of abdominal surgery.  States his pain is diffuse and 10 out of 10.        Prior to Admission Medications   Prescriptions Last Dose Informant Patient Reported? Taking?   Cholecalciferol 25 MCG (1000 UT) capsule   Yes No   Sig: Take by oral route.   Flaxseed, Linseed, (Flax Seed Oil) 1000 MG CAPS   Yes No   Sig: Take by oral route.   Patient not taking: Reported on 9/26/2023   apixaban (Eliquis) 5 mg   No No   Sig: Take 1 tablet (5 mg total) by mouth 2 (two) times a day Do not start before November 9, 2023.   ascorbic acid (VITAMIN C) 1000 MG tablet   Yes No   Sig: Take 1 tablet every day by oral route.   celecoxib (CeleBREX) 200 mg capsule   Yes No   Sig: Take 1 capsule twice a day by oral route.   Patient not taking: Reported on 3/14/2024   clindamycin (CLEOCIN) 150 mg capsule   Yes No   Sig: TAKE 4 TABS 1 HR PRIOR TO DENTAL PROCEDURES   dorzolamide-timolol (COSOPT) 22.3-6.8 MG/ML ophthalmic solution  Self Yes No   Sig: Administer 1 drop to both eyes 2 (two) times a day   methocarbamol (ROBAXIN) 750 mg tablet   Yes No   Sig: Take 1 tablet by mouth 3 (three) times a day   Patient not taking: Reported on 9/26/2023   oxyCODONE-acetaminophen (PERCOCET) 5-325 mg per tablet   Yes No   Sig: Take 1 tablet by mouth every 4 (four) hours as needed   Patient not taking: Reported on 3/14/2024   predniSONE 10 mg tablet   Yes No   Sig: PLEASE SEE ATTACHED FOR DETAILED DIRECTIONS   Patient not taking: Reported on 3/14/2024    saccharomyces boulardii (FLORASTOR) 250 mg capsule   Yes No   Sig: Take by oral route.   Patient not taking: Reported on 9/26/2023      Facility-Administered Medications: None       History reviewed. No pertinent past medical history.    History reviewed. No pertinent surgical history.    History reviewed. No pertinent family history.  I have reviewed and agree with the history as documented.    E-Cigarette/Vaping    E-Cigarette Use Never User      E-Cigarette/Vaping Substances     Social History     Tobacco Use    Smoking status: Never    Smokeless tobacco: Never   Vaping Use    Vaping status: Never Used   Substance Use Topics    Alcohol use: Never    Drug use: Never       Review of Systems   Constitutional:  Negative for chills and fever.   HENT:  Negative for ear pain and sore throat.    Eyes:  Negative for pain and visual disturbance.   Respiratory:  Negative for cough and shortness of breath.    Cardiovascular:  Negative for chest pain and palpitations.   Gastrointestinal:  Positive for abdominal pain. Negative for vomiting.   Genitourinary:  Negative for dysuria and hematuria.   Musculoskeletal:  Negative for arthralgias and back pain.   Skin:  Negative for color change and rash.   Neurological:  Negative for seizures and syncope.   All other systems reviewed and are negative.      Physical Exam  Physical Exam  Vitals and nursing note reviewed.   Constitutional:       General: He is not in acute distress.     Appearance: Normal appearance. He is well-developed. He is obese.   HENT:      Head: Normocephalic and atraumatic.      Right Ear: External ear normal.      Left Ear: External ear normal. There is no impacted cerumen.      Nose: Nose normal.   Eyes:      Extraocular Movements: Extraocular movements intact.      Conjunctiva/sclera: Conjunctivae normal.   Cardiovascular:      Rate and Rhythm: Normal rate and regular rhythm.      Heart sounds: No murmur heard.  Pulmonary:      Effort: Pulmonary effort is  normal. No respiratory distress.      Breath sounds: Normal breath sounds.   Abdominal:      General: Abdomen is flat. Bowel sounds are normal. There is no distension.      Palpations: Abdomen is soft. There is no mass.      Tenderness: There is abdominal tenderness. There is guarding.      Hernia: No hernia is present.      Comments: Diffuse tenderness   Musculoskeletal:         General: No swelling, tenderness or deformity. Normal range of motion.      Cervical back: Normal range of motion and neck supple. No rigidity.   Skin:     General: Skin is warm and dry.      Capillary Refill: Capillary refill takes less than 2 seconds.   Neurological:      General: No focal deficit present.      Mental Status: He is alert and oriented to person, place, and time. Mental status is at baseline.      Sensory: No sensory deficit.      Coordination: Coordination normal.   Psychiatric:         Mood and Affect: Mood normal.         Vital Signs  ED Triage Vitals [06/04/24 1845]   Temperature Pulse Respirations Blood Pressure SpO2   97.7 °F (36.5 °C) 69 18 157/97 97 %      Temp Source Heart Rate Source Patient Position - Orthostatic VS BP Location FiO2 (%)   Temporal Monitor Sitting Left arm --      Pain Score       10 - Worst Possible Pain           Vitals:    06/04/24 2100 06/04/24 2145 06/04/24 2245 06/04/24 2329   BP: 124/66 125/64 128/73 128/73   Pulse: 70 70 65 62   Patient Position - Orthostatic VS:             Visual Acuity      ED Medications  Medications   morphine injection 4 mg (4 mg Intravenous Given 6/4/24 1912)   ondansetron (ZOFRAN) injection 4 mg (4 mg Intravenous Given 6/4/24 1911)   sodium chloride 0.9 % bolus 1,000 mL (0 mL Intravenous Stopped 6/4/24 2330)   HYDROmorphone (DILAUDID) injection 1 mg (1 mg Intravenous Given 6/4/24 1939)   iohexol (OMNIPAQUE) 350 MG/ML injection (MULTI-DOSE) 100 mL (100 mL Intravenous Given 6/4/24 2029)       Diagnostic Studies  Results Reviewed       Procedure Component Value Units  Date/Time    UA w Reflex to Microscopic w Reflex to Culture [[id number]] Collected: 06/04/24 2255    Lab Status: Final result Specimen: Urine, Clean Catch Updated: 06/04/24 2303     Color, UA Yellow     Clarity, UA Clear     Specific Gravity, UA 1.010     pH, UA 5.5     Leukocytes, UA Negative     Nitrite, UA Negative     Protein, UA Negative mg/dl      Glucose, UA Negative mg/dl      Ketones, UA Negative mg/dl      Urobilinogen, UA 0.2 E.U./dl      Bilirubin, UA Negative     Occult Blood, UA Negative    CMP [[id number]] Collected: 06/04/24 1938    Lab Status: Final result Specimen: Blood from Arm, Right Updated: 06/04/24 2004     Sodium 139 mmol/L      Potassium 4.1 mmol/L      Chloride 106 mmol/L      CO2 27 mmol/L      ANION GAP 6 mmol/L      BUN 20 mg/dL      Creatinine 0.99 mg/dL      Glucose 129 mg/dL      Calcium 10.1 mg/dL      AST 18 U/L      ALT 17 U/L      Alkaline Phosphatase 54 U/L      Total Protein 7.1 g/dL      Albumin 4.2 g/dL      Total Bilirubin 0.51 mg/dL      eGFR 75 ml/min/1.73sq m     Narrative:      National Kid[employer] Foundation guidelines for Chronic Kidney Disease (CKD):     Stage 1 with normal or high GFR (GFR > 90 mL/min/1.73 square meters)    Stage 2 Mild CKD (GFR = 60-89 mL/min/1.73 square meters)    Stage 3A Moderate CKD (GFR = 45-59 mL/min/1.73 square meters)    Stage 3B Moderate CKD (GFR = 30-44 mL/min/1.73 square meters)    Stage 4 Severe CKD (GFR = 15-29 mL/min/1.73 square meters)    Stage 5 End Stage CKD (GFR <15 mL/min/1.73 square meters)  Note: GFR calculation is accurate only with a steady state creatinine    Lipase [[id number]]  (Normal) Collected: 06/04/24 1938    Lab Status: Final result Specimen: Blood from Arm, Right Updated: 06/04/24 2004     Lipase 40 u/L     CBC and differential [[id number]] Collected: 06/04/24 1938    Lab Status: Final result Specimen: Blood from Arm, Right Updated: 06/04/24 1946     WBC 7.19 Thousand/uL      RBC 5.33 Million/uL      Hemoglobin 15.8  g/dL      Hematocrit 47.7 %      MCV 90 fL      MCH 29.6 pg      MCHC 33.1 g/dL      RDW 13.0 %      MPV 9.8 fL      Platelets 207 Thousands/uL      nRBC 0 /100 WBCs      Segmented % 66 %      Immature Grans % 0 %      Lymphocytes % 23 %      Monocytes % 9 %      Eosinophils Relative 1 %      Basophils Relative 1 %      Absolute Neutrophils 4.74 Thousands/µL      Absolute Immature Grans 0.02 Thousand/uL      Absolute Lymphocytes 1.66 Thousands/µL      Absolute Monocytes 0.63 Thousand/µL      Eosinophils Absolute 0.10 Thousand/µL      Basophils Absolute 0.04 Thousands/µL                    CT Abdomen pelvis with contrast   Final Result by Krishan Judd MD (06/04 2148)   Unchanged hepatic and renal hypodensities.      Nonspecific focal stranding in the left anterior pelvis is new with slightly prominent ureter at this level.. No vascular pathology. May represent focal edema secondary to inflammation. Urinalysis to exclude urinary etiology given the prominent ureter.         Workstation performed: [id number]                    Procedures  Procedures         ED Course  ED Course as of 06/05/24 0032   Tue Jun 04, 2024 2314 Patient has no acute finding on the laboratory workup.  Diagnostic studies suggest cystitis however urinalysis is normal.  Symptoms are most likely related to a viral illness and/or the food he consumed right before his symptoms started.  He has been medicated and feels comfortable and is improved.  He is stable for discharge.                               SBIRT 20yo+      Flowsheet Row Most Recent Value   Initial Alcohol Screen: US AUDIT-C     1. How often do you have a drink containing alcohol? 0 Filed at: 06/04/2024 1847   2. How many drinks containing alcohol do you have on a typical day you are drinking?  0 Filed at: 06/04/2024 1847   3b. FEMALE Any Age, or MALE 65+: How often do you have 4 or more drinks on one occassion? 0 Filed at: 06/04/2024 1847   Audit-C Score 0 Filed at:  06/04/2024 1847   BRAD: How many times in the past year have you...    Used an illegal drug or used a prescription medication for non-medical reasons? Never Filed at: 06/04/2024 1847                      Medical Decision Making  73-year-old-year-male presenting to the ED for evaluation of abdominal pain after eating dinner.  Differential diagnosis includes but not limited to: Bowel obstruction, ischemic bowel, gastroenteritis, acute cholecystitis, diverticulitis, appendicitis    Amount and/or Complexity of Data Reviewed  Labs: ordered.  Radiology: ordered.    Risk  Prescription drug management.             Disposition  Final diagnoses:   Abdominal pain     Time reflects when diagnosis was documented in both MDM as applicable and the Disposition within this note       Time User Action Codes Description Comment    6/4/2024 11:15 PM Ana Ibrahim Add [R10.9] Abdominal pain           ED Disposition       ED Disposition   Discharge    Condition   Stable    Date/Time   Tue Jun 4, 2024 7797    Comment   Bjorn Goldberg Jr. discharge to home/self care.                   Follow-up Information       Follow up With Specialties Details Why Contact Info    Toby Mchugh DO Family Medicine   [address] [place] [id number]  [phone]              Discharge Medication List as of 6/4/2024 11:25 PM        START taking these medications    Details   dicyclomine (BENTYL) 20 mg tablet Take 1 tablet (20 mg total) by mouth 2 (two) times a day, Starting Tue 6/4/2024, Normal      famotidine (PEPCID) 20 mg tablet Take 1 tablet (20 mg total) by mouth daily, Starting Tue 6/4/2024, Normal      ondansetron (ZOFRAN) 4 mg tablet Take 1 tablet (4 mg total) by mouth every 6 (six) hours, Starting Tue 6/4/2024, Normal           CONTINUE these medications which have NOT CHANGED    Details   apixaban (Eliquis) 5 mg Take 1 tablet (5 mg total) by mouth 2 (two) times a day Do not start before November 9, 2023., Starting Thu 11/9/2023,  Normal      ascorbic acid (VITAMIN C) 1000 MG tablet Take 1 tablet every day by oral route., Historical Med      celecoxib (CeleBREX) 200 mg capsule Take 1 capsule twice a day by oral route., Historical Med      Cholecalciferol 25 MCG (1000 UT) capsule Take by oral route., Historical Med      clindamycin (CLEOCIN) 150 mg capsule TAKE 4 TABS 1 HR PRIOR TO DENTAL PROCEDURES, Historical Med      dorzolamide-timolol (COSOPT) 22.3-6.8 MG/ML ophthalmic solution Administer 1 drop to both eyes 2 (two) times a day, Historical Med      Flaxseed, Linseed, (Flax Seed Oil) 1000 MG CAPS Take by oral route., Historical Med      methocarbamol (ROBAXIN) 750 mg tablet Take 1 tablet by mouth 3 (three) times a day, Historical Med      oxyCODONE-acetaminophen (PERCOCET) 5-325 mg per tablet Take 1 tablet by mouth every 4 (four) hours as needed, Starting Thu 2/8/2024, Historical Med      predniSONE 10 mg tablet PLEASE SEE ATTACHED FOR DETAILED DIRECTIONS, Historical Med      saccharomyces boulardii (FLORASTOR) 250 mg capsule Take by oral route., Historical Med             No discharge procedures on file.    PDMP Review       None            ED Provider  Electronically Signed by             Ana Ibrahim DO  06/05/24 0032

## 2024-07-29 ENCOUNTER — TELEPHONE (OUTPATIENT)
Dept: PULMONOLOGY | Facility: CLINIC | Age: 74
End: 2024-07-29

## 2024-07-29 NOTE — TELEPHONE ENCOUNTER
Left message for patient to call back and schedule a follow up appointment with Dr. Terrazas or LAKESHA Hill in September of 2024.

## 2024-08-25 ENCOUNTER — HOSPITAL ENCOUNTER (EMERGENCY)
Facility: HOSPITAL | Age: 74
Discharge: HOME | End: 2024-08-25
Attending: EMERGENCY MEDICINE
Payer: MEDICARE

## 2024-08-25 ENCOUNTER — APPOINTMENT (EMERGENCY)
Dept: RADIOLOGY | Facility: HOSPITAL | Age: 74
End: 2024-08-25
Payer: MEDICARE

## 2024-08-25 VITALS
DIASTOLIC BLOOD PRESSURE: 62 MMHG | HEART RATE: 68 BPM | OXYGEN SATURATION: 96 % | TEMPERATURE: 97.7 F | SYSTOLIC BLOOD PRESSURE: 126 MMHG | RESPIRATION RATE: 18 BRPM | BODY MASS INDEX: 39.64 KG/M2 | WEIGHT: 245.59 LBS

## 2024-08-25 DIAGNOSIS — M79.673 HEEL PAIN: Primary | ICD-10-CM

## 2024-08-25 PROCEDURE — 99283 EMERGENCY DEPT VISIT LOW MDM: CPT

## 2024-08-25 PROCEDURE — 96372 THER/PROPH/DIAG INJ SC/IM: CPT

## 2024-08-25 PROCEDURE — 73610 X-RAY EXAM OF ANKLE: CPT

## 2024-08-25 PROCEDURE — 73650 X-RAY EXAM OF HEEL: CPT

## 2024-08-25 PROCEDURE — 99284 EMERGENCY DEPT VISIT MOD MDM: CPT | Performed by: EMERGENCY MEDICINE

## 2024-08-25 RX ORDER — DEXAMETHASONE SODIUM PHOSPHATE 10 MG/ML
8 INJECTION, SOLUTION INTRAMUSCULAR; INTRAVENOUS ONCE
Status: COMPLETED | OUTPATIENT
Start: 2024-08-25 | End: 2024-08-25

## 2024-08-25 RX ORDER — KETOROLAC TROMETHAMINE 30 MG/ML
15 INJECTION, SOLUTION INTRAMUSCULAR; INTRAVENOUS ONCE
Status: COMPLETED | OUTPATIENT
Start: 2024-08-25 | End: 2024-08-25

## 2024-08-25 RX ORDER — OXYCODONE AND ACETAMINOPHEN 5; 325 MG/1; MG/1
1 TABLET ORAL EVERY 8 HOURS PRN
Qty: 6 TABLET | Refills: 0 | Status: SHIPPED | OUTPATIENT
Start: 2024-08-25 | End: 2024-08-27

## 2024-08-25 RX ORDER — IBUPROFEN 800 MG/1
800 TABLET, FILM COATED ORAL 3 TIMES DAILY
Qty: 21 TABLET | Refills: 0 | Status: SHIPPED | OUTPATIENT
Start: 2024-08-25

## 2024-08-25 RX ORDER — PREDNISONE 20 MG/1
40 TABLET ORAL DAILY
Qty: 10 TABLET | Refills: 0 | Status: SHIPPED | OUTPATIENT
Start: 2024-08-25 | End: 2024-08-30

## 2024-08-25 RX ADMIN — KETOROLAC TROMETHAMINE 15 MG: 30 INJECTION, SOLUTION INTRAMUSCULAR; INTRAVENOUS at 12:37

## 2024-08-25 RX ADMIN — DEXAMETHASONE SODIUM PHOSPHATE 8 MG: 10 INJECTION, SOLUTION INTRAMUSCULAR; INTRAVENOUS at 12:37

## 2024-08-25 NOTE — ED PROVIDER NOTES
History  Chief Complaint   Patient presents with    Foot Pain     Left ankle pain after tripping. Deneis head strike, and no LOC     This is a 74-year-old male presenting to the ED for evaluation of left ankle pain after tripping yesterday.  Patient denies any head strike or any LOC.  Patient states that he has pain with bending or extending his ankle at the level of the heel.  He has been able to ambulate but with difficulty.        Prior to Admission Medications   Prescriptions Last Dose Informant Patient Reported? Taking?   Cholecalciferol 25 MCG (1000 UT) capsule   Yes No   Sig: Take by oral route.   Flaxseed, Linseed, (Flax Seed Oil) 1000 MG CAPS   Yes No   Sig: Take by oral route.   Patient not taking: Reported on 9/26/2023   apixaban (Eliquis) 5 mg   No No   Sig: Take 1 tablet (5 mg total) by mouth 2 (two) times a day Do not start before November 9, 2023.   ascorbic acid (VITAMIN C) 1000 MG tablet   Yes No   Sig: Take 1 tablet every day by oral route.   celecoxib (CeleBREX) 200 mg capsule   Yes No   Sig: Take 1 capsule twice a day by oral route.   Patient not taking: Reported on 3/14/2024   clindamycin (CLEOCIN) 150 mg capsule   Yes No   Sig: TAKE 4 TABS 1 HR PRIOR TO DENTAL PROCEDURES   dicyclomine (BENTYL) 20 mg tablet   No No   Sig: Take 1 tablet (20 mg total) by mouth 2 (two) times a day   dorzolamide-timolol (COSOPT) 22.3-6.8 MG/ML ophthalmic solution  Self Yes No   Sig: Administer 1 drop to both eyes 2 (two) times a day   famotidine (PEPCID) 20 mg tablet   No No   Sig: Take 1 tablet (20 mg total) by mouth daily   methocarbamol (ROBAXIN) 750 mg tablet   Yes No   Sig: Take 1 tablet by mouth 3 (three) times a day   Patient not taking: Reported on 9/26/2023   ondansetron (ZOFRAN) 4 mg tablet   No No   Sig: Take 1 tablet (4 mg total) by mouth every 6 (six) hours   oxyCODONE-acetaminophen (PERCOCET) 5-325 mg per tablet   Yes No   Sig: Take 1 tablet by mouth every 4 (four) hours as needed   Patient not taking:  Reported on 3/14/2024   predniSONE 10 mg tablet   Yes No   Sig: PLEASE SEE ATTACHED FOR DETAILED DIRECTIONS   Patient not taking: Reported on 3/14/2024   saccharomyces boulardii (FLORASTOR) 250 mg capsule   Yes No   Sig: Take by oral route.   Patient not taking: Reported on 9/26/2023      Facility-Administered Medications: None       History reviewed. No pertinent past medical history.    Past Surgical History:   Procedure Laterality Date    KNEE SURGERY Bilateral     NECK SURGERY         History reviewed. No pertinent family history.  I have reviewed and agree with the history as documented.    E-Cigarette/Vaping    E-Cigarette Use Never User      E-Cigarette/Vaping Substances     Social History     Tobacco Use    Smoking status: Never    Smokeless tobacco: Never   Vaping Use    Vaping status: Never Used   Substance Use Topics    Alcohol use: Never    Drug use: Never       Review of Systems   Constitutional:  Negative for chills and fever.   HENT:  Negative for ear pain and sore throat.    Eyes:  Negative for pain and visual disturbance.   Respiratory:  Negative for cough and shortness of breath.    Cardiovascular:  Negative for chest pain and palpitations.   Gastrointestinal:  Negative for abdominal pain and vomiting.   Genitourinary:  Negative for dysuria and hematuria.   Musculoskeletal:  Positive for arthralgias, gait problem and joint swelling. Negative for back pain.   Skin:  Negative for color change and rash.   Neurological:  Negative for seizures and syncope.   All other systems reviewed and are negative.      Physical Exam  Physical Exam  Vitals and nursing note reviewed.   Constitutional:       General: He is in acute distress.      Appearance: Normal appearance. He is well-developed.   HENT:      Head: Normocephalic and atraumatic.      Right Ear: External ear normal.      Left Ear: External ear normal.      Mouth/Throat:      Mouth: Mucous membranes are moist.   Eyes:      Extraocular Movements:  Extraocular movements intact.      Conjunctiva/sclera: Conjunctivae normal.   Cardiovascular:      Rate and Rhythm: Normal rate and regular rhythm.      Heart sounds: No murmur heard.  Pulmonary:      Effort: Pulmonary effort is normal. No respiratory distress.      Breath sounds: Normal breath sounds.   Abdominal:      General: Abdomen is flat. Bowel sounds are normal. There is no distension.      Palpations: Abdomen is soft. There is no mass.      Tenderness: There is no abdominal tenderness. There is no right CVA tenderness, left CVA tenderness, guarding or rebound.      Hernia: No hernia is present.   Musculoskeletal:         General: Tenderness and signs of injury present. No swelling or deformity.      Cervical back: Normal range of motion and neck supple.      Right lower leg: No edema.      Left lower leg: No edema.      Comments: Decreased ROM of the LLE at the ankle and heel secondary to pain    Skin:     General: Skin is warm and dry.      Capillary Refill: Capillary refill takes less than 2 seconds.   Neurological:      General: No focal deficit present.      Mental Status: He is alert and oriented to person, place, and time.   Psychiatric:         Mood and Affect: Mood normal.         Vital Signs  ED Triage Vitals [08/25/24 1159]   Temperature Pulse Respirations Blood Pressure SpO2   97.7 °F (36.5 °C) 68 18 126/62 96 %      Temp Source Heart Rate Source Patient Position - Orthostatic VS BP Location FiO2 (%)   Temporal Monitor -- Right arm --      Pain Score       8           Vitals:    08/25/24 1159   BP: 126/62   Pulse: 68         Visual Acuity      ED Medications  Medications   ketorolac (TORADOL) injection 15 mg (15 mg Intramuscular Given 8/25/24 1237)   dexamethasone (PF) (DECADRON) injection 8 mg (8 mg Intramuscular Given 8/25/24 1237)       Diagnostic Studies  Results Reviewed       None                   XR heel / calcaneus 2+ views LEFT    (Results Pending)   XR ankle 3+ vw left    (Results  Pending)              Procedures  Procedures         ED Course                                               Medical Decision Making  Differential diagnosis includes but not limited to: Fracture, dislocation, sprain/strain, ligamentous injury    Amount and/or Complexity of Data Reviewed  Radiology: ordered.    Risk  Prescription drug management.                 Disposition  Final diagnoses:   Heel pain     Time reflects when diagnosis was documented in both MDM as applicable and the Disposition within this note       Time User Action Codes Description Comment    8/25/2024  2:00 PM Ana Ibrahim Add [M79.673] Heel pain           ED Disposition       ED Disposition   Discharge    Condition   Stable    Date/Time   Sun Aug 25, 2024  2:00 PM    Comment   Bjorn Goldberg Jr. discharge to home/self care.                   Follow-up Information       Follow up With Specialties Details Why Contact Info    Nemo Parra DPM Podiatry, Wound Care, General Surgery Schedule an appointment as soon as possible for a visit   [address] [place] [phone]  [facility] [id number]  [phone]              Discharge Medication List as of 8/25/2024  2:04 PM        START taking these medications    Details   ibuprofen (MOTRIN) 800 mg tablet Take 1 tablet (800 mg total) by mouth 3 (three) times a day, Starting Sun 8/25/2024, Normal      !! oxyCODONE-acetaminophen (Percocet) 5-325 mg per tablet Take 1 tablet by mouth every 8 (eight) hours as needed for severe pain for up to 2 days Max Daily Amount: 3 tablets, Starting Sun 8/25/2024, Until Tue 8/27/2024 at 2359, Normal      !! predniSONE 20 mg tablet Take 2 tablets (40 mg total) by mouth daily for 5 days, Starting Sun 8/25/2024, Until Fri 8/30/2024, Normal       !! - Potential duplicate medications found. Please discuss with provider.        CONTINUE these medications which have NOT CHANGED    Details   apixaban (Eliquis) 5 mg Take 1 tablet (5 mg total) by mouth 2 (two) times a day Do not  start before November 9, 2023., Starting Thu 11/9/2023, Normal      ascorbic acid (VITAMIN C) 1000 MG tablet Take 1 tablet every day by oral route., Historical Med      celecoxib (CeleBREX) 200 mg capsule Take 1 capsule twice a day by oral route., Historical Med      Cholecalciferol 25 MCG (1000 UT) capsule Take by oral route., Historical Med      clindamycin (CLEOCIN) 150 mg capsule TAKE 4 TABS 1 HR PRIOR TO DENTAL PROCEDURES, Historical Med      dicyclomine (BENTYL) 20 mg tablet Take 1 tablet (20 mg total) by mouth 2 (two) times a day, Starting Tue 6/4/2024, Normal      dorzolamide-timolol (COSOPT) 22.3-6.8 MG/ML ophthalmic solution Administer 1 drop to both eyes 2 (two) times a day, Historical Med      famotidine (PEPCID) 20 mg tablet Take 1 tablet (20 mg total) by mouth daily, Starting Tue 6/4/2024, Normal      Flaxseed, Linseed, (Flax Seed Oil) 1000 MG CAPS Take by oral route., Historical Med      methocarbamol (ROBAXIN) 750 mg tablet Take 1 tablet by mouth 3 (three) times a day, Historical Med      ondansetron (ZOFRAN) 4 mg tablet Take 1 tablet (4 mg total) by mouth every 6 (six) hours, Starting Tue 6/4/2024, Normal      !! oxyCODONE-acetaminophen (PERCOCET) 5-325 mg per tablet Take 1 tablet by mouth every 4 (four) hours as needed, Starting Thu 2/8/2024, Historical Med      !! predniSONE 10 mg tablet PLEASE SEE ATTACHED FOR DETAILED DIRECTIONS, Historical Med      saccharomyces boulardii (FLORASTOR) 250 mg capsule Take by oral route., Historical Med       !! - Potential duplicate medications found. Please discuss with provider.          No discharge procedures on file.    PDMP Review       None            ED Provider  Electronically Signed by             Ana Ibrahim DO  08/25/24 1912

## 2025-05-25 ENCOUNTER — HOSPITAL ENCOUNTER (EMERGENCY)
Facility: HOSPITAL | Age: 75
Discharge: HOME | End: 2025-05-25
Attending: EMERGENCY MEDICINE | Admitting: EMERGENCY MEDICINE
Payer: MEDICARE

## 2025-05-25 ENCOUNTER — APPOINTMENT (EMERGENCY)
Dept: RADIOLOGY | Facility: HOSPITAL | Age: 75
End: 2025-05-25
Payer: MEDICARE

## 2025-05-25 VITALS
TEMPERATURE: 98.8 F | DIASTOLIC BLOOD PRESSURE: 65 MMHG | HEART RATE: 73 BPM | OXYGEN SATURATION: 96 % | SYSTOLIC BLOOD PRESSURE: 146 MMHG | BODY MASS INDEX: 39.54 KG/M2 | RESPIRATION RATE: 18 BRPM | WEIGHT: 245 LBS

## 2025-05-25 DIAGNOSIS — R68.89 FLU-LIKE SYMPTOMS: Primary | ICD-10-CM

## 2025-05-25 LAB
FLUAV AG UPPER RESP QL IA.RAPID: NEGATIVE
FLUBV AG UPPER RESP QL IA.RAPID: NEGATIVE
S PYO DNA THROAT QL NAA+PROBE: NOT DETECTED
SARS-COV+SARS-COV-2 AG RESP QL IA.RAPID: NEGATIVE

## 2025-05-25 PROCEDURE — 99284 EMERGENCY DEPT VISIT MOD MDM: CPT | Performed by: PHYSICIAN ASSISTANT

## 2025-05-25 PROCEDURE — 87811 SARS-COV-2 COVID19 W/OPTIC: CPT | Performed by: PHYSICIAN ASSISTANT

## 2025-05-25 PROCEDURE — 99283 EMERGENCY DEPT VISIT LOW MDM: CPT

## 2025-05-25 PROCEDURE — 87804 INFLUENZA ASSAY W/OPTIC: CPT | Performed by: PHYSICIAN ASSISTANT

## 2025-05-25 PROCEDURE — 87651 STREP A DNA AMP PROBE: CPT | Performed by: PHYSICIAN ASSISTANT

## 2025-05-25 PROCEDURE — 71046 X-RAY EXAM CHEST 2 VIEWS: CPT

## 2025-05-25 RX ORDER — METHYLPREDNISOLONE 4 MG/1
TABLET ORAL
Qty: 21 TABLET | Refills: 0 | Status: SHIPPED | OUTPATIENT
Start: 2025-05-25

## 2025-05-25 RX ORDER — BENZONATATE 100 MG/1
100 CAPSULE ORAL EVERY 8 HOURS
Qty: 21 CAPSULE | Refills: 0 | Status: SHIPPED | OUTPATIENT
Start: 2025-05-25

## 2025-05-25 RX ORDER — BENZONATATE 100 MG/1
100 CAPSULE ORAL ONCE
Status: COMPLETED | OUTPATIENT
Start: 2025-05-25 | End: 2025-05-25

## 2025-05-25 RX ADMIN — BENZONATATE 100 MG: 100 CAPSULE ORAL at 13:53

## 2025-05-25 NOTE — ED PROVIDER NOTES
Time reflects when diagnosis was documented in both MDM as applicable and the Disposition within this note       Time User Action Codes Description Comment    5/25/2025  1:54 PM AriesAlisson Add [R68.89] Flu-like symptoms           ED Disposition       ED Disposition   Discharge    Condition   Stable    Date/Time   Sun May 25, 2025  1:54 PM    Comment   Bjorn Goldberg Jr. discharge to home/self care.                   Assessment & Plan       Medical Decision Making  75 year old male presented to the emergency department for evaluation of URI symptoms.  Vitals and medical record reviewed.  Patient at risk for the following but not limited to COVID, flu, strep, viral illness, pneumonia.  Patient's history and physical exam most consistent with viral illness.  Viral panel shows negative. ED interpretation of chest xray was negative. Strep test negative. Patient treated symptomatically. Patient was educated on symptoms that require prompt return to the emergency department for further evaluation and verbalized understanding.  Patient was clinically and hemodynamically stable for discharge.         Problems Addressed:  Flu-like symptoms: acute illness or injury    Amount and/or Complexity of Data Reviewed  Labs: ordered. Decision-making details documented in ED Course.  Radiology: ordered and independent interpretation performed.    Risk  Prescription drug management.        ED Course as of 05/25/25 1725   [place] May 25, 2025   1332 FLU/COVID Rapid Antigen (30 min. TAT) - Preferred screening test in ED  negative   1354 STREP A PCR: Not Detected       Medications   benzonatate (TESSALON PERLES) capsule 100 mg (100 mg Oral Given 5/25/25 1353)       ED Risk Strat Scores                    No data recorded        SBIRT 22yo+      Flowsheet Row Most Recent Value   Initial Alcohol Screen: US AUDIT-C     1. How often do you have a drink containing alcohol? 0 Filed at: 05/25/2025 1255   2. How many drinks containing alcohol  do you have on a typical day you are drinking?  0 Filed at: 05/25/2025 1250   3b. FEMALE Any Age, or MALE 65+: How often do you have 4 or more drinks on one occassion? 0 Filed at: 05/25/2025 1251   Audit-C Score 0 Filed at: 05/25/2025 1251   BRAD: How many times in the past year have you...    Used an illegal drug or used a prescription medication for non-medical reasons? Never Filed at: 05/25/2025 1258                            History of Present Illness       Chief Complaint   Patient presents with    Flu Symptoms     Patient presents to the ED with complaints of sore throat, runny nose, and mild cough that began on Tuesday.        Past Medical History[1]   Past Surgical History[2]   Family History[3]   Social History[4]   E-Cigarette/Vaping    E-Cigarette Use Never User       E-Cigarette/Vaping Substances      I have reviewed and agree with the history as documented.     75 year old male presents to the ED for evaluation of cough, runny nose, sore throat. Onset of symptoms Tuesday. No fevers or chills. No chest pain, palpations or SOB. Denies abdominal pain, N/V or diarrhea. He reports a past history of pneumonia which is what prompted the visit today along with encouragement from his sister. He states he has been using robitussin for cough with improvement.         Review of Systems   Constitutional:  Negative for appetite change, chills, diaphoresis, fatigue and fever.   HENT:  Positive for congestion, rhinorrhea and sore throat. Negative for trouble swallowing.    Respiratory:  Positive for cough. Negative for shortness of breath, wheezing and stridor.    Cardiovascular: Negative.    Gastrointestinal: Negative.    Musculoskeletal: Negative.    Skin: Negative.    Neurological: Negative.    All other systems reviewed and are negative.          Objective       ED Triage Vitals [05/25/25 1257]   Temperature Pulse Blood Pressure Respirations SpO2 Patient Position - Orthostatic VS   98.8 °F (37.1 °C) 81 (!) 179/84  18 97 % Sitting      Temp Source Heart Rate Source BP Location FiO2 (%) Pain Score    Temporal Monitor Left arm -- 5      Vitals      Date and Time Temp Pulse SpO2 Resp BP Pain Score FACES Pain Rating User   05/25/25 1345 -- 73 96 % 18 146/65 -- -- LAK   05/25/25 1257 98.8 °F (37.1 °C) 81 97 % 18 179/84 5 -- RR            Physical Exam  Vitals and nursing note reviewed.   Constitutional:       General: He is not in acute distress.     Appearance: Normal appearance. He is normal weight. He is not ill-appearing, toxic-appearing or diaphoretic.      Comments: Speaks in clear full sentences.    HENT:      Head: Normocephalic.      Nose: Nose normal.      Mouth/Throat:      Mouth: Mucous membranes are moist.      Pharynx: Oropharynx is clear. Posterior oropharyngeal erythema present. No oropharyngeal exudate.     Eyes:      Extraocular Movements: Extraocular movements intact.      Conjunctiva/sclera: Conjunctivae normal.      Pupils: Pupils are equal, round, and reactive to light.       Cardiovascular:      Rate and Rhythm: Normal rate and regular rhythm.   Pulmonary:      Effort: Pulmonary effort is normal. No respiratory distress.      Breath sounds: Normal breath sounds. No stridor. No wheezing, rhonchi or rales.   Chest:      Chest wall: No tenderness.     Musculoskeletal:         General: Normal range of motion.     Skin:     General: Skin is warm and dry.     Neurological:      General: No focal deficit present.      Mental Status: He is alert.         Results Reviewed       Procedure Component Value Units Date/Time    Strep A PCR [[id number]]  (Normal) Collected: 05/25/25 1305    Lab Status: Final result Specimen: Throat Updated: 05/25/25 1351     STREP A PCR Not Detected    FLU/COVID Rapid Antigen (30 min. TAT) - Preferred screening test in ED [[id number]]  (Normal) Collected: 05/25/25 1305    Lab Status: Final result Specimen: Nares from Nose Updated: 05/25/25 1327     SARS COV Rapid Antigen Negative     Influenza  A Rapid Antigen Negative     Influenza B Rapid Antigen Negative    Narrative:      This test has been performed using the [employer] [facility] 2 FLU+SARS Antigen test under the Emergency Use Authorization (EUA). This test has been validated by the  and verified by the performing laboratory. The [facility] uses lateral flow immunofluorescent sandwich assay to detect SARS-COV, Influenza A and Influenza B Antigen.     The [employer] [facility] 2 SARS Antigen test does not differentiate between SARS-CoV and SARS-CoV-2.     Negative results are presumptive and may be confirmed with a molecular assay, if necessary, for patient management. Negative results do not rule out SARS-CoV-2 or influenza infection and should not be used as the sole basis for treatment or patient management decisions. A negative test result may occur if the level of antigen in a sample is below the limit of detection of this test.     Positive results are indicative of the presence of viral antigens, but do not rule out bacterial infection or co-infection with other viruses.     All test results should be used as an adjunct to clinical observations and other information available to the provider.    FOR PEDIATRIC PATIENTS - copy/paste COVID Guidelines URL to browser: [email or web address]            XR chest 2 views   ED Interpretation by Alisson Chris PA-C (05/25 1720)   NAD          Procedures    ED Medication and Procedure Management   Prior to Admission Medications   Prescriptions Last Dose Informant Patient Reported? Taking?   Cholecalciferol 25 MCG (1000 UT) capsule   Yes No   Sig: Take by oral route.   Flaxseed, Linseed, (Flax Seed Oil) 1000 MG CAPS   Yes No   Sig: Take by oral route.   Patient not taking: Reported on 9/26/2023   apixaban (Eliquis) 5 mg   No No   Sig: Take 1 tablet (5 mg total) by mouth 2 (two) times a day Do not start before November 9, 2023.   ascorbic acid (VITAMIN C) 1000 MG  tablet   Yes No   Sig: Take 1 tablet every day by oral route.   celecoxib (CeleBREX) 200 mg capsule   Yes No   Sig: Take 1 capsule twice a day by oral route.   Patient not taking: Reported on 3/14/2024   clindamycin (CLEOCIN) 150 mg capsule   Yes No   Sig: TAKE 4 TABS 1 HR PRIOR TO DENTAL PROCEDURES   dicyclomine (BENTYL) 20 mg tablet   No No   Sig: Take 1 tablet (20 mg total) by mouth 2 (two) times a day   dorzolamide-timolol (COSOPT) 22.3-6.8 MG/ML ophthalmic solution  Self Yes No   Sig: Administer 1 drop to both eyes 2 (two) times a day   famotidine (PEPCID) 20 mg tablet   No No   Sig: Take 1 tablet (20 mg total) by mouth daily   ibuprofen (MOTRIN) 800 mg tablet   No No   Sig: Take 1 tablet (800 mg total) by mouth 3 (three) times a day   methocarbamol (ROBAXIN) 750 mg tablet   Yes No   Sig: Take 1 tablet by mouth 3 (three) times a day   Patient not taking: Reported on 9/26/2023   ondansetron (ZOFRAN) 4 mg tablet   No No   Sig: Take 1 tablet (4 mg total) by mouth every 6 (six) hours   oxyCODONE-acetaminophen (PERCOCET) 5-325 mg per tablet   Yes No   Sig: Take 1 tablet by mouth every 4 (four) hours as needed   Patient not taking: Reported on 3/14/2024   predniSONE 10 mg tablet   Yes No   Sig: PLEASE SEE ATTACHED FOR DETAILED DIRECTIONS   Patient not taking: Reported on 3/14/2024   saccharomyces boulardii (FLORASTOR) 250 mg capsule   Yes No   Sig: Take by oral route.   Patient not taking: Reported on 9/26/2023      Facility-Administered Medications: None     Discharge Medication List as of 5/25/2025  1:57 PM        START taking these medications    Details   benzonatate (TESSALON PERLES) 100 mg capsule Take 1 capsule (100 mg total) by mouth every 8 (eight) hours, Starting Sun 5/25/2025, Normal      methylPREDNISolone 4 MG tablet therapy pack Use as directed on package, Normal           CONTINUE these medications which have NOT CHANGED    Details   apixaban (Eliquis) 5 mg Take 1 tablet (5 mg total) by mouth 2 (two)  times a day Do not start before November 9, 2023., Starting Thu 11/9/2023, Normal      ascorbic acid (VITAMIN C) 1000 MG tablet Take 1 tablet every day by oral route., Historical Med      celecoxib (CeleBREX) 200 mg capsule Take 1 capsule twice a day by oral route., Historical Med      Cholecalciferol 25 MCG (1000 UT) capsule Take by oral route., Historical Med      clindamycin (CLEOCIN) 150 mg capsule TAKE 4 TABS 1 HR PRIOR TO DENTAL PROCEDURES, Historical Med      dicyclomine (BENTYL) 20 mg tablet Take 1 tablet (20 mg total) by mouth 2 (two) times a day, Starting Tue 6/4/2024, Normal      dorzolamide-timolol (COSOPT) 22.3-6.8 MG/ML ophthalmic solution Administer 1 drop to both eyes 2 (two) times a day, Historical Med      famotidine (PEPCID) 20 mg tablet Take 1 tablet (20 mg total) by mouth daily, Starting Tue 6/4/2024, Normal      Flaxseed, Linseed, (Flax Seed Oil) 1000 MG CAPS Take by oral route., Historical Med      ibuprofen (MOTRIN) 800 mg tablet Take 1 tablet (800 mg total) by mouth 3 (three) times a day, Starting Sun 8/25/2024, Normal      methocarbamol (ROBAXIN) 750 mg tablet Take 1 tablet by mouth 3 (three) times a day, Historical Med      ondansetron (ZOFRAN) 4 mg tablet Take 1 tablet (4 mg total) by mouth every 6 (six) hours, Starting Tue 6/4/2024, Normal      oxyCODONE-acetaminophen (PERCOCET) 5-325 mg per tablet Take 1 tablet by mouth every 4 (four) hours as needed, Starting Thu 2/8/2024, Historical Med      predniSONE 10 mg tablet PLEASE SEE ATTACHED FOR DETAILED DIRECTIONS, Historical Med      saccharomyces boulardii (FLORASTOR) 250 mg capsule Take by oral route., Historical Med           No discharge procedures on file.  ED SEPSIS DOCUMENTATION   Time reflects when diagnosis was documented in both MDM as applicable and the Disposition within this note       Time User Action Codes Description Comment    5/25/2025  1:54 PM Alisson Chris Add [R68.89] Flu-like symptoms                    [1] No past  medical history on file.  [2]   Past Surgical History:  Procedure Laterality Date    KNEE SURGERY Bilateral     NECK SURGERY     [3] No family history on file.  [4]   Social History  Tobacco Use    Smoking status: Never    Smokeless tobacco: Never   Vaping Use    Vaping status: Never Used   Substance Use Topics    Alcohol use: Never    Drug use: Never        Alisson Chris PA-C  05/25/25 [id number]

## 2025-05-25 NOTE — DISCHARGE INSTRUCTIONS
Please take medications as prescribed to help with cough. Follow up with your family doctor. Return with new or worsening symptoms  Your x-ray was reviewed today in the emergency department and there was no obvious abnormalities found, however, the imaging will be reviewed by the radiologist later this evening or the following day and if there is any abnormalities found you will get a phone call with those results.

## 2025-07-10 ENCOUNTER — TRANSCRIBE ORDERS (OUTPATIENT)
Dept: LAB | Facility: HOSPITAL | Age: 75
End: 2025-07-10

## 2025-07-10 ENCOUNTER — TRANSCRIBE ORDERS (OUTPATIENT)
Dept: ADMINISTRATIVE | Facility: HOSPITAL | Age: 75
End: 2025-07-10

## 2025-07-10 ENCOUNTER — APPOINTMENT (OUTPATIENT)
Dept: LAB | Facility: HOSPITAL | Age: 75
End: 2025-07-10
Payer: MEDICARE

## 2025-07-10 DIAGNOSIS — M25.511 RIGHT SHOULDER PAIN, UNSPECIFIED CHRONICITY: ICD-10-CM

## 2025-07-10 DIAGNOSIS — Z01.818 OTHER SPECIFIED PRE-OPERATIVE EXAMINATION: Primary | ICD-10-CM

## 2025-07-10 DIAGNOSIS — M75.121 COMPLETE TEAR OF RIGHT ROTATOR CUFF, UNSPECIFIED WHETHER TRAUMATIC: ICD-10-CM

## 2025-07-10 DIAGNOSIS — Z01.818 PREOP EXAMINATION: ICD-10-CM

## 2025-07-10 DIAGNOSIS — Z01.818 PREOP EXAMINATION: Primary | ICD-10-CM

## 2025-07-10 DIAGNOSIS — Z01.818 OTHER SPECIFIED PRE-OPERATIVE EXAMINATION: ICD-10-CM

## 2025-07-10 LAB
ALBUMIN SERPL BCG-MCNC: 4.2 G/DL (ref 3.5–5)
ALP SERPL-CCNC: 51 U/L (ref 34–104)
ALT SERPL W P-5'-P-CCNC: 15 U/L (ref 7–52)
ANION GAP SERPL CALCULATED.3IONS-SCNC: 7 MMOL/L (ref 4–13)
AST SERPL W P-5'-P-CCNC: 16 U/L (ref 13–39)
BILIRUB SERPL-MCNC: 0.86 MG/DL (ref 0.2–1)
BUN SERPL-MCNC: 20 MG/DL (ref 5–25)
CALCIUM SERPL-MCNC: 9.6 MG/DL (ref 8.4–10.2)
CHLORIDE SERPL-SCNC: 111 MMOL/L (ref 96–108)
CO2 SERPL-SCNC: 24 MMOL/L (ref 21–32)
CREAT SERPL-MCNC: 0.91 MG/DL (ref 0.6–1.3)
EST. AVERAGE GLUCOSE BLD GHB EST-MCNC: 114 MG/DL
GFR SERPL CREATININE-BSD FRML MDRD: 82 ML/MIN/1.73SQ M
GLUCOSE P FAST SERPL-MCNC: 94 MG/DL (ref 65–99)
HBA1C MFR BLD: 5.6 %
INR PPP: 1.04 (ref 0.85–1.19)
POTASSIUM SERPL-SCNC: 4.1 MMOL/L (ref 3.5–5.3)
PROT SERPL-MCNC: 6.6 G/DL (ref 6.4–8.4)
PROTHROMBIN TIME: 14 SECONDS (ref 12.3–15)
SODIUM SERPL-SCNC: 142 MMOL/L (ref 135–147)

## 2025-07-10 PROCEDURE — 85025 COMPLETE CBC W/AUTO DIFF WBC: CPT

## 2025-07-10 PROCEDURE — 36415 COLL VENOUS BLD VENIPUNCTURE: CPT

## 2025-07-10 PROCEDURE — 85610 PROTHROMBIN TIME: CPT

## 2025-07-10 PROCEDURE — 83036 HEMOGLOBIN GLYCOSYLATED A1C: CPT

## 2025-07-10 PROCEDURE — 80053 COMPREHEN METABOLIC PANEL: CPT

## 2025-07-11 LAB
BASOPHILS # BLD AUTO: 0.03 THOUSANDS/ÂΜL (ref 0–0.1)
BASOPHILS NFR BLD AUTO: 1 % (ref 0–1)
EOSINOPHIL # BLD AUTO: 0.08 THOUSAND/ÂΜL (ref 0–0.61)
EOSINOPHIL NFR BLD AUTO: 2 % (ref 0–6)
ERYTHROCYTE [DISTWIDTH] IN BLOOD BY AUTOMATED COUNT: 14.1 % (ref 11.6–15.1)
HCT VFR BLD AUTO: 46 % (ref 36.5–49.3)
HGB BLD-MCNC: 14.8 G/DL (ref 12–17)
IMM GRANULOCYTES # BLD AUTO: 0.01 THOUSAND/UL (ref 0–0.2)
IMM GRANULOCYTES NFR BLD AUTO: 0 % (ref 0–2)
LYMPHOCYTES # BLD AUTO: 1.29 THOUSANDS/ÂΜL (ref 0.6–4.47)
LYMPHOCYTES NFR BLD AUTO: 26 % (ref 14–44)
MCH RBC QN AUTO: 30.3 PG (ref 26.8–34.3)
MCHC RBC AUTO-ENTMCNC: 32.2 G/DL (ref 31.4–37.4)
MCV RBC AUTO: 94 FL (ref 82–98)
MONOCYTES # BLD AUTO: 0.46 THOUSAND/ÂΜL (ref 0.17–1.22)
MONOCYTES NFR BLD AUTO: 9 % (ref 4–12)
NEUTROPHILS # BLD AUTO: 3.01 THOUSANDS/ÂΜL (ref 1.85–7.62)
NEUTS SEG NFR BLD AUTO: 62 % (ref 43–75)
NRBC BLD AUTO-RTO: 0 /100 WBCS
PLATELET # BLD AUTO: 214 THOUSANDS/UL (ref 149–390)
PMV BLD AUTO: 9.9 FL (ref 8.9–12.7)
RBC # BLD AUTO: 4.88 MILLION/UL (ref 3.88–5.62)
WBC # BLD AUTO: 4.88 THOUSAND/UL (ref 4.31–10.16)